# Patient Record
Sex: MALE | Race: WHITE | NOT HISPANIC OR LATINO | Employment: FULL TIME | URBAN - METROPOLITAN AREA
[De-identification: names, ages, dates, MRNs, and addresses within clinical notes are randomized per-mention and may not be internally consistent; named-entity substitution may affect disease eponyms.]

---

## 2018-04-05 ENCOUNTER — OFFICE VISIT (OUTPATIENT)
Dept: URGENT CARE | Facility: CLINIC | Age: 38
End: 2018-04-05
Payer: COMMERCIAL

## 2018-04-05 VITALS
HEIGHT: 75 IN | OXYGEN SATURATION: 97 % | TEMPERATURE: 101.2 F | WEIGHT: 215 LBS | DIASTOLIC BLOOD PRESSURE: 99 MMHG | SYSTOLIC BLOOD PRESSURE: 141 MMHG | HEART RATE: 96 BPM | BODY MASS INDEX: 26.73 KG/M2 | RESPIRATION RATE: 18 BRPM

## 2018-04-05 DIAGNOSIS — K62.89 PAIN, RECTAL: Primary | ICD-10-CM

## 2018-04-05 PROCEDURE — 99203 OFFICE O/P NEW LOW 30 MIN: CPT

## 2018-04-05 RX ORDER — MULTIVIT-MIN/IRON FUM/FOLIC AC 7.5 MG-4
1 TABLET ORAL DAILY
COMMUNITY

## 2018-04-05 RX ORDER — SACCHAROMYCES BOULARDII 250 MG
250 CAPSULE ORAL 2 TIMES DAILY
COMMUNITY
End: 2019-11-30 | Stop reason: HOSPADM

## 2018-04-05 RX ORDER — ALLOPURINOL 300 MG/1
300 TABLET ORAL DAILY
COMMUNITY

## 2018-04-05 NOTE — PROGRESS NOTES
[facility]        NAME: Kanchan Lorenzana is a 42 y o  male  : 1980    MRN: [id number]  DATE: 2018  TIME: 3:11 PM    Assessment and Plan   Pain, rectal [K62 89]  1  Pain, rectal       Patient Instructions     Pt to hospital for further evaluation    Chief Complaint     Chief Complaint   Patient presents with    Rectal Pain     rectal pain after coughing, described as sharp  dull pain when just sitting  no noticable blood with stool  states 2 episodes of pink blood when passed gas  History of Present Illness       42yo M p/w worsening rectal pain x 1 week since initiation of cough   Patient was seen by PCP 1 week ago, dx with pharyngitis, and placed on amoxicillin  Patient states pain is most intense during cough- described as 8/10 and sharp in nature  Pain is dull and 2/10 at rest   Patient denies blood in stool but did notice blood near rectum twice after passing gas  Patient denies diarrhea, abdominal pain, sob, hemorrhoids,  Hx of rectal pain  Patient found to be febrile upon exam         Review of Systems   Review of Systems   Constitutional: Positive for fever  Negative for activity change, appetite change, chills, diaphoresis, fatigue and unexpected weight change  Respiratory: Negative for apnea, cough, choking, chest tightness, shortness of breath, wheezing and stridor  Cardiovascular: Negative for chest pain, palpitations and leg swelling  Gastrointestinal: Positive for anal bleeding and rectal pain  Negative for abdominal distention, abdominal pain, blood in stool, constipation, diarrhea, nausea and vomiting           Current Medications       Current Outpatient Prescriptions:     allopurinol (ZYLOPRIM) 300 mg tablet, Take 300 mg by mouth daily, Disp: , Rfl:     Multiple Vitamins-Minerals (MULTIVITAMIN WITH MINERALS) tablet, Take 1 tablet by mouth daily, Disp: , Rfl:     saccharomyces boulardii (FLORASTOR) 250 mg capsule, Take 250 mg by mouth 2 (two) times a day, Disp: , Rfl:     Current Allergies     Allergies as of 04/05/2018 - Reviewed 04/05/2018   Allergen Reaction Noted    Kiwi extract Tongue Swelling 04/05/2018            The following portions of the patient's history were reviewed and updated as appropriate: allergies, current medications, past family history, past medical history, past social history, past surgical history and problem list      Past Medical History:   Diagnosis Date    Gout        Past Surgical History:   Procedure Laterality Date    KNEE SURGERY Left        No family history on file  Medications have been verified  Objective   /99   Pulse 96   Temp (!) 101 2 °F (38 4 °C) (Tympanic)   Resp 18   Ht 6' 3" (1 905 m)   Wt 97 5 kg (215 lb)   SpO2 97%   BMI 26 87 kg/m²        Physical Exam     Physical Exam   Constitutional: He appears well-developed and well-nourished  HENT:   Head: Normocephalic  Cardiovascular: Normal rate, regular rhythm, normal heart sounds and intact distal pulses  Exam reveals no gallop and no friction rub  No murmur heard  Pulmonary/Chest: Effort normal and breath sounds normal  No respiratory distress  He has no wheezes  He has no rales  He exhibits no tenderness  Abdominal: Soft  Bowel sounds are normal  He exhibits no distension and no mass  There is no tenderness  There is no rebound, no guarding and no CVA tenderness  No hernia     Rectal exam normal- no blood, fissure, hemorrhoid

## 2018-06-03 ENCOUNTER — OFFICE VISIT (OUTPATIENT)
Dept: URGENT CARE | Facility: CLINIC | Age: 38
End: 2018-06-03
Payer: COMMERCIAL

## 2018-06-03 VITALS
BODY MASS INDEX: 26.11 KG/M2 | SYSTOLIC BLOOD PRESSURE: 162 MMHG | WEIGHT: 210 LBS | TEMPERATURE: 99 F | DIASTOLIC BLOOD PRESSURE: 98 MMHG | RESPIRATION RATE: 18 BRPM | HEART RATE: 87 BPM | HEIGHT: 75 IN | OXYGEN SATURATION: 99 %

## 2018-06-03 DIAGNOSIS — H10.9 BACTERIAL CONJUNCTIVITIS: Primary | ICD-10-CM

## 2018-06-03 DIAGNOSIS — M77.8 WRIST TENDONITIS: ICD-10-CM

## 2018-06-03 PROCEDURE — 99213 OFFICE O/P EST LOW 20 MIN: CPT | Performed by: PHYSICIAN ASSISTANT

## 2018-06-03 RX ORDER — NAPROXEN 500 MG/1
250 TABLET ORAL 2 TIMES DAILY WITH MEALS
Qty: 14 TABLET | Refills: 0 | Status: SHIPPED | OUTPATIENT
Start: 2018-06-03 | End: 2019-11-30 | Stop reason: HOSPADM

## 2018-06-03 RX ORDER — TOBRAMYCIN 3 MG/ML
1 SOLUTION/ DROPS OPHTHALMIC
Qty: 1.8 ML | Refills: 0 | Status: SHIPPED | OUTPATIENT
Start: 2018-06-03 | End: 2018-06-10

## 2018-06-03 NOTE — PROGRESS NOTES
[facility]        NAME: Veronique Flanagan is a 42 y o  male  : 1980    MRN: [id number]  DATE: Dalia 3, 2018  TIME: 12:04 PM    Assessment and Plan   Bacterial conjunctivitis [H10 9]  1  Bacterial conjunctivitis  tobramycin (TOBREX) 0 3 % SOLN   2  Wrist tendonitis  naproxen (NAPROSYN) 500 mg tablet     Patient Instructions     Thumb spica splint  Apply ice 10-20 minutes at a time at least 4 times a day  Naproxen BID  Take drops as prescribed  Follow up with PCP in 3-5 days  Proceed to  ER if symptoms worsen  Chief Complaint     Chief Complaint   Patient presents with    Eye Pain     woke up this morning with right eye swollen and crusted    Wrist Pain     pain left wrist pain for 1 month with weakness  no injury     History of Present Illness       Eye Pain    The right eye is affected  This is a new problem  The current episode started today  The problem occurs constantly  The problem has been gradually worsening  There was no injury mechanism  There is known exposure to pink eye  He wears contacts  Associated symptoms include an eye discharge and eye redness  Pertinent negatives include no blurred vision, double vision, fever, foreign body sensation, itching, nausea, photophobia, recent URI, tingling or vomiting  He has tried nothing for the symptoms  Wrist Pain    The pain is present in the left wrist  This is a new problem  Episode onset: 1 mo ago  There has been no history of extremity trauma  The problem occurs daily  The problem has been gradually worsening  The quality of the pain is described as sharp  The pain is moderate  Pertinent negatives include no fever, inability to bear weight, itching, joint locking, joint swelling, limited range of motion, numbness, stiffness or tingling  He has tried nothing for the symptoms  Family history does not include gout or rheumatoid arthritis  There is no history of diabetes, gout, osteoarthritis or rheumatoid arthritis         Review of Systems Review of Systems   Constitutional: Negative for fever  Eyes: Positive for pain, discharge and redness  Negative for blurred vision, double vision, photophobia, itching and visual disturbance  Gastrointestinal: Negative for nausea and vomiting  Musculoskeletal: Negative for gout and stiffness  Skin: Negative for itching  Neurological: Negative for tingling and numbness  Current Medications       Current Outpatient Prescriptions:     Cinnamon 500 MG TABS, Take 1 tablet by mouth daily, Disp: , Rfl:     allopurinol (ZYLOPRIM) 300 mg tablet, Take 300 mg by mouth daily, Disp: , Rfl:     Multiple Vitamins-Minerals (MULTIVITAMIN WITH MINERALS) tablet, Take 1 tablet by mouth daily, Disp: , Rfl:     naproxen (NAPROSYN) 500 mg tablet, Take 0 5 tablets (250 mg total) by mouth 2 (two) times a day with meals for 14 days, Disp: 14 tablet, Rfl: 0    saccharomyces boulardii (FLORASTOR) 250 mg capsule, Take 250 mg by mouth 2 (two) times a day, Disp: , Rfl:     tobramycin (TOBREX) 0 3 % SOLN, Administer 1 drop into the left eye every 4 (four) hours while awake for 7 days, Disp: 1 8 mL, Rfl: 0    Current Allergies     Allergies as of 06/03/2018 - Reviewed 06/03/2018   Allergen Reaction Noted    Kiwi extract Tongue Swelling 04/05/2018            The following portions of the patient's history were reviewed and updated as appropriate: allergies, current medications, past family history, past medical history, past social history, past surgical history and problem list      Past Medical History:   Diagnosis Date    Gout        Past Surgical History:   Procedure Laterality Date    KNEE SURGERY Left        No family history on file  Medications have been verified          Objective   /98   Pulse 87   Temp 99 °F (37 2 °C) (Tympanic)   Resp 18   Ht 6' 3" (1 905 m)   Wt 95 3 kg (210 lb)   SpO2 99%   BMI 26 25 kg/m²        Physical Exam     Physical Exam   Constitutional: He appears well-developed and well-nourished  Eyes: Right eye exhibits discharge (purulent)  Right eye exhibits no chemosis, no exudate and no hordeolum  No foreign body present in the right eye  Left eye exhibits no chemosis, no discharge, no exudate and no hordeolum  No foreign body present in the left eye  Right conjunctiva is injected  Right conjunctiva has no hemorrhage  Left conjunctiva is not injected  Left conjunctiva has no hemorrhage  Right eye exhibits normal extraocular motion and no nystagmus  Left eye exhibits normal extraocular motion and no nystagmus  Right pupil is round and reactive  Left pupil is round and reactive  Pupils are equal    Cardiovascular: Normal rate, regular rhythm, normal heart sounds and intact distal pulses  Exam reveals no friction rub  No murmur heard  Musculoskeletal:        Right wrist: He exhibits tenderness  He exhibits normal range of motion, no bony tenderness, no swelling, no effusion, no crepitus, no deformity and no laceration

## 2018-09-04 ENCOUNTER — OFFICE VISIT (OUTPATIENT)
Dept: URGENT CARE | Facility: CLINIC | Age: 38
End: 2018-09-04
Payer: COMMERCIAL

## 2018-09-04 VITALS
OXYGEN SATURATION: 99 % | HEIGHT: 75 IN | HEART RATE: 90 BPM | TEMPERATURE: 99.4 F | RESPIRATION RATE: 20 BRPM | BODY MASS INDEX: 26.11 KG/M2 | SYSTOLIC BLOOD PRESSURE: 169 MMHG | DIASTOLIC BLOOD PRESSURE: 100 MMHG | WEIGHT: 210 LBS

## 2018-09-04 DIAGNOSIS — H66.91 RIGHT OTITIS MEDIA, UNSPECIFIED OTITIS MEDIA TYPE: Primary | ICD-10-CM

## 2018-09-04 PROCEDURE — 99213 OFFICE O/P EST LOW 20 MIN: CPT | Performed by: PHYSICIAN ASSISTANT

## 2018-09-04 RX ORDER — AMOXICILLIN AND CLAVULANATE POTASSIUM 875; 125 MG/1; MG/1
1 TABLET, FILM COATED ORAL EVERY 12 HOURS SCHEDULED
Qty: 20 TABLET | Refills: 0 | Status: SHIPPED | OUTPATIENT
Start: 2018-09-04 | End: 2018-09-14

## 2018-09-04 RX ORDER — CETIRIZINE HYDROCHLORIDE 10 MG/1
10 TABLET ORAL DAILY
Qty: 30 TABLET | Refills: 0 | Status: SHIPPED | OUTPATIENT
Start: 2018-09-04 | End: 2020-10-04

## 2018-09-04 NOTE — PROGRESS NOTES
[facility]        NAME: Marina Chambers is a 42 y o  male  : 1980    MRN: [id number]  DATE: 2018  TIME: 7:54 PM    Assessment and Plan   Right otitis media, unspecified otitis media type [H66 91]  1  Right otitis media, unspecified otitis media type  amoxicillin-clavulanate (AUGMENTIN) 875-125 mg per tablet    cetirizine (ZyrTEC) 10 mg tablet         Patient Instructions     Take antibiotic as prescribed  Follow up with PCP in 3-5 days  Proceed to  ER if symptoms worsen  Chief Complaint     Chief Complaint   Patient presents with    Earache     right ear pain started 3 days ago, increasing pain today         History of Present Illness       Earache    There is pain in the right ear  This is a new problem  The current episode started in the past 7 days  The problem occurs every few minutes  The problem has been unchanged  There has been no fever  The pain is moderate  Associated symptoms include rhinorrhea  Pertinent negatives include no abdominal pain, coughing, diarrhea, ear discharge, headaches, hearing loss, neck pain, rash, sore throat or vomiting  He has tried nothing for the symptoms  There is no history of a chronic ear infection, hearing loss or a tympanostomy tube  Review of Systems   Review of Systems   HENT: Positive for ear pain and rhinorrhea  Negative for ear discharge, hearing loss and sore throat  Respiratory: Negative for cough  Gastrointestinal: Negative for abdominal pain, diarrhea and vomiting  Musculoskeletal: Negative for neck pain  Skin: Negative for rash  Neurological: Negative for headaches           Current Medications       Current Outpatient Prescriptions:     allopurinol (ZYLOPRIM) 300 mg tablet, Take 300 mg by mouth daily, Disp: , Rfl:     Cinnamon 500 MG TABS, Take 1 tablet by mouth daily, Disp: , Rfl:     Multiple Vitamins-Minerals (MULTIVITAMIN WITH MINERALS) tablet, Take 1 tablet by mouth daily, Disp: , Rfl:    amoxicillin-clavulanate (AUGMENTIN) 875-125 mg per tablet, Take 1 tablet by mouth every 12 (twelve) hours for 10 days, Disp: 20 tablet, Rfl: 0    cetirizine (ZyrTEC) 10 mg tablet, Take 1 tablet (10 mg total) by mouth daily for 30 days, Disp: 30 tablet, Rfl: 0    naproxen (NAPROSYN) 500 mg tablet, Take 0 5 tablets (250 mg total) by mouth 2 (two) times a day with meals for 14 days, Disp: 14 tablet, Rfl: 0    saccharomyces boulardii (FLORASTOR) 250 mg capsule, Take 250 mg by mouth 2 (two) times a day, Disp: , Rfl:     Current Allergies     Allergies as of 09/04/2018 - Reviewed 09/04/2018   Allergen Reaction Noted    Kiwi extract Tongue Swelling 04/05/2018            The following portions of the patient's history were reviewed and updated as appropriate: allergies, current medications, past family history, past medical history, past social history, past surgical history and problem list      Past Medical History:   Diagnosis Date    Gout        Past Surgical History:   Procedure Laterality Date    KNEE SURGERY Left        History reviewed  No pertinent family history  Medications have been verified  Objective   /100 (BP Location: Right arm, Patient Position: Sitting, Cuff Size: Large)   Pulse 90   Temp 99 4 °F (37 4 °C) (Tympanic)   Resp 20   Ht 6' 3" (1 905 m)   Wt 95 3 kg (210 lb)   SpO2 99%   BMI 26 25 kg/m²        Physical Exam     Physical Exam   Constitutional: He appears well-developed and well-nourished  HENT:   Head: Normocephalic  Right Ear: External ear normal  Tympanic membrane is erythematous and bulging  Left Ear: Hearing, tympanic membrane, external ear and ear canal normal    Nose: Mucosal edema and rhinorrhea present  Mouth/Throat: Oropharynx is clear and moist  No oropharyngeal exudate  Cardiovascular: Normal rate, regular rhythm and intact distal pulses  Exam reveals no gallop and no friction rub  No murmur heard    Pulmonary/Chest: Effort normal and breath sounds normal  No respiratory distress  He has no wheezes  He has no rales  Abdominal: Soft  Bowel sounds are normal  He exhibits no distension  There is no tenderness  There is no rebound and no guarding

## 2019-05-07 ENCOUNTER — OFFICE VISIT (OUTPATIENT)
Dept: URGENT CARE | Facility: CLINIC | Age: 39
End: 2019-05-07
Payer: COMMERCIAL

## 2019-05-07 VITALS
OXYGEN SATURATION: 100 % | WEIGHT: 220 LBS | RESPIRATION RATE: 18 BRPM | TEMPERATURE: 98 F | SYSTOLIC BLOOD PRESSURE: 142 MMHG | HEIGHT: 75 IN | BODY MASS INDEX: 27.35 KG/M2 | HEART RATE: 88 BPM | DIASTOLIC BLOOD PRESSURE: 90 MMHG

## 2019-05-07 DIAGNOSIS — H65.01 RIGHT ACUTE SEROUS OTITIS MEDIA, RECURRENCE NOT SPECIFIED: ICD-10-CM

## 2019-05-07 DIAGNOSIS — J30.2 SEASONAL ALLERGIES: Primary | ICD-10-CM

## 2019-05-07 PROCEDURE — 99213 OFFICE O/P EST LOW 20 MIN: CPT | Performed by: PHYSICIAN ASSISTANT

## 2019-05-07 RX ORDER — FLUTICASONE PROPIONATE 50 MCG
1 SPRAY, SUSPENSION (ML) NASAL DAILY
Qty: 1 BOTTLE | Refills: 0 | Status: SHIPPED | OUTPATIENT
Start: 2019-05-07 | End: 2019-11-30 | Stop reason: HOSPADM

## 2019-05-07 RX ORDER — FEXOFENADINE HYDROCHLORIDE 60 MG/1
60 TABLET, FILM COATED ORAL DAILY
Qty: 30 TABLET | Refills: 0 | Status: SHIPPED | OUTPATIENT
Start: 2019-05-07 | End: 2019-11-30 | Stop reason: HOSPADM

## 2019-06-07 DIAGNOSIS — J30.2 SEASONAL ALLERGIES: ICD-10-CM

## 2019-06-10 RX ORDER — FLUTICASONE PROPIONATE 50 MCG
SPRAY, SUSPENSION (ML) NASAL
Refills: 0 | OUTPATIENT
Start: 2019-06-10

## 2019-06-10 RX ORDER — FEXOFENADINE HYDROCHLORIDE 60 MG/1
TABLET, FILM COATED ORAL
Qty: 30 TABLET | Refills: 0 | OUTPATIENT
Start: 2019-06-10

## 2019-07-25 ENCOUNTER — OFFICE VISIT (OUTPATIENT)
Dept: URGENT CARE | Facility: CLINIC | Age: 39
End: 2019-07-25
Payer: COMMERCIAL

## 2019-07-25 VITALS
RESPIRATION RATE: 16 BRPM | OXYGEN SATURATION: 99 % | HEIGHT: 75 IN | WEIGHT: 235 LBS | SYSTOLIC BLOOD PRESSURE: 174 MMHG | HEART RATE: 90 BPM | BODY MASS INDEX: 29.22 KG/M2 | DIASTOLIC BLOOD PRESSURE: 94 MMHG | TEMPERATURE: 99.4 F

## 2019-07-25 DIAGNOSIS — R21 RASH: Primary | ICD-10-CM

## 2019-07-25 PROCEDURE — 99213 OFFICE O/P EST LOW 20 MIN: CPT | Performed by: PHYSICIAN ASSISTANT

## 2019-07-25 RX ORDER — MOMETASONE FUROATE 1 MG/G
CREAM TOPICAL DAILY
Qty: 45 G | Refills: 0 | Status: SHIPPED | OUTPATIENT
Start: 2019-07-25 | End: 2019-11-30 | Stop reason: HOSPADM

## 2019-07-25 NOTE — PATIENT INSTRUCTIONS
Apply Elocon cream as prescribed (Do not use for longer than 4 weeks)  Wash hands following administered   Wear deet or picaridin spray to avoid bug bites  Avoid scratching area  Follow up with PCP in 3-5 days  Proceed to  ER if symptoms worsen  Acute Rash   WHAT YOU NEED TO KNOW:   A rash is irritation, redness, or itchiness in the skin or mucus membranes  Mucus membranes are areas such as the lining of your nose or throat  Acute means the rash starts suddenly, worsens quickly, and lasts a short time  An acute rash may be caused by a disease, such as hepatitis or vasculitis  The rash may be a reaction to something you are allergic to, such as certain foods, or latex  Certain medicines, including antibiotics, NSAIDs, prescription pain medicine, and aspirin can also cause a rash  DISCHARGE INSTRUCTIONS:   Return to the emergency department if:   · You have sudden trouble breathing or chest pain  · You are vomiting, have a headache or muscle aches, and your throat hurts  Contact your healthcare provider if:   · You have a fever  · You get open wounds from scratching your skin, or you have a wound that is red, swollen, or painful  · Your rash lasts longer than 3 months  · You have swelling or pain in your joints  · You have questions or concerns about your condition or care  Medicines:  If your rash does not go away on its own, you may need the following medicines:  · Antihistamines  may be given to help decrease itching  · Steroids  may be given to decrease inflammation  · Antibiotics  help fight or prevent a bacterial infection  · Take your medicine as directed  Contact your healthcare provider if you think your medicine is not helping or if you have side effects  Tell him of her if you are allergic to any medicine  Keep a list of the medicines, vitamins, and herbs you take  Include the amounts, and when and why you take them   Bring the list or the pill bottles to follow-up visits  Carry your medicine list with you in case of an emergency  Prevent a rash or care for your skin when you have a rash:  Dry skin can lead to more problems  Do not scratch your skin if it itches  You may cause a skin infection by scratching  The following may prevent dry skin, and help your skin look better:  · Use thick cream lotions or petroleum jelly to help soothe your rash  These products work well on areas with thick skin, such as your feet  Cool compresses may also be used to soothe your skin  Apply a cool compress or a cool, wet towel, and then cover it with a dry towel  · Use lukewarm water when you bathe  Hot water may damage your skin more  Pat your skin dry  Do not rub your skin with a towel  · Use detergents, soaps, shampoos, and bubble baths made for sensitive skin  Wear clothes that are made of cotton instead of nylon or wool  Cotton is softer, so it will not hurt your skin as much  Follow up with your healthcare provider as directed: You may need to see a dermatologist if healthcare providers do not know what is causing your rash  You may also need to see a dermatologist if your rash does not get better even with treatment  You may need to see a dietitian if you have allergies to foods  Write down your questions so you remember to ask them during your visits  © 2017 [address] [employer] Information is for End User's use only and may not be sold, redistributed or otherwise used for commercial purposes  All illustrations and images included in CareNotes® are the copyrighted property of [employer]  or [employer]  The above information is an  only  It is not intended as medical advice for individual conditions or treatments  Talk to your doctor, nurse or pharmacist before following any medical regimen to see if it is safe and effective for you

## 2019-07-25 NOTE — PROGRESS NOTES
[facility]        NAME: Haris Buchanan is a 34 y o  male  : 1980    MRN: [id number]  DATE: 2019  TIME: 12:51 PM    Assessment and Plan   Rash [R21]  1  Rash  mometasone (ELOCON) 0 1 % cream     Rash does not look bacterial or fungal  Patient recalls bug bite over area some I am therefore covering for steroid responsive rashes  Discussed possibility of shingles given location and quality of discomfort  Recommended shingles vaccine and instructed patient to keep area covered  He is outside the range for antiviral administration  Take photo of rash each day and follow up with PCP if symptoms are not improving  Patient Instructions     Apply Elocon cream as prescribed (Do not use for longer than 4 weeks)  Wash hands following administered   Wear deet or picaridin spray to avoid bug bites  Avoid scratching area  Follow up with PCP in 3-5 days  Proceed to  ER if symptoms worsen  Chief Complaint     Chief Complaint   Patient presents with    Insect Bite     c/o insect bite that occurred approx 5 days ago that began as a small raised red area and has increased to a 2" red itchy area with a few small red raised bumps around it  History of Present Illness       Patient states he was out on the deck when he felt a sudden stinging pain "like something bit me" and visualized a small red bump over the L aspect of his mid back x 5 days ago  States rash has grown  Admits to pruritic and occasionally burning pain  Denies mediations  Denies contacts with other symptoms  Patient had varicella as a child  Denies shingles vaccines  Insect Bite   This is a new problem  The current episode started in the past 7 days  The problem occurs constantly  The problem has been gradually worsening  Pertinent negatives include no coughing or sore throat  Review of Systems   Review of Systems   HENT: Negative for sore throat and trouble swallowing      Respiratory: Negative for cough and shortness of breath  Skin: Positive for color change  Negative for wound  Allergic/Immunologic: Negative for environmental allergies and food allergies  Current Medications       Current Outpatient Medications:     allopurinol (ZYLOPRIM) 300 mg tablet, Take 300 mg by mouth daily, Disp: , Rfl:     Cinnamon 500 MG TABS, Take 1 tablet by mouth daily, Disp: , Rfl:     fexofenadine (ALLEGRA) 60 MG tablet, Take 1 tablet (60 mg total) by mouth daily, Disp: 30 tablet, Rfl: 0    fluticasone (FLONASE) 50 mcg/act nasal spray, 1 spray into each nostril daily, Disp: 1 Bottle, Rfl: 0    Multiple Vitamins-Minerals (MULTIVITAMIN WITH MINERALS) tablet, Take 1 tablet by mouth daily, Disp: , Rfl:     saccharomyces boulardii (FLORASTOR) 250 mg capsule, Take 250 mg by mouth 2 (two) times a day, Disp: , Rfl:     cetirizine (ZyrTEC) 10 mg tablet, Take 1 tablet (10 mg total) by mouth daily for 30 days, Disp: 30 tablet, Rfl: 0    mometasone (ELOCON) 0 1 % cream, Apply topically daily, Disp: 45 g, Rfl: 0    naproxen (NAPROSYN) 500 mg tablet, Take 0 5 tablets (250 mg total) by mouth 2 (two) times a day with meals for 14 days, Disp: 14 tablet, Rfl: 0    Current Allergies     Allergies as of 07/25/2019 - Reviewed 07/25/2019   Allergen Reaction Noted    Kiwi extract Tongue Swelling 04/05/2018            The following portions of the patient's history were reviewed and updated as appropriate: allergies, current medications, past family history, past medical history, past social history, past surgical history and problem list      Past Medical History:   Diagnosis Date    Allergic     Diabetes mellitus ([facility] [address] )     Gout     Gout        Past Surgical History:   Procedure Laterality Date    KNEE SURGERY Left     NOSE SURGERY         Family History   Problem Relation Age of Onset    Heart disease Mother     Diabetes Mother     Parkinsonism Father     Heart disease Father          Medications have been verified          Objective BP (!) 174/94   Pulse 90   Temp 99 4 °F (37 4 °C) (Tympanic)   Resp 16   Ht 6' 3" (1 905 m)   Wt 107 kg (235 lb)   SpO2 99%   BMI 29 37 kg/m²        Physical Exam     Physical Exam   Constitutional: He appears well-developed and well-nourished  No distress  HENT:   Head: Normocephalic and atraumatic  Cardiovascular: Normal rate, regular rhythm and normal heart sounds  Exam reveals no gallop and no friction rub  No murmur heard  Pulmonary/Chest: Effort normal and breath sounds normal  No respiratory distress  He has no wheezes  He has no rales  He exhibits no tenderness  Musculoskeletal:        Back:    Lymphadenopathy:     He has no cervical adenopathy  Neurological: He is alert  Skin: Rash (5cm x 2cm erythematous plaque with scattered surrounding erythematou papules  No vesicles  Rash location depicted below  ) noted  He is not diaphoretic

## 2019-11-30 ENCOUNTER — OFFICE VISIT (OUTPATIENT)
Dept: URGENT CARE | Facility: CLINIC | Age: 39
End: 2019-11-30
Payer: COMMERCIAL

## 2019-11-30 VITALS
SYSTOLIC BLOOD PRESSURE: 162 MMHG | HEART RATE: 107 BPM | DIASTOLIC BLOOD PRESSURE: 100 MMHG | OXYGEN SATURATION: 97 % | TEMPERATURE: 99.4 F | RESPIRATION RATE: 18 BRPM

## 2019-11-30 DIAGNOSIS — M54.42 ACUTE LEFT-SIDED LOW BACK PAIN WITH LEFT-SIDED SCIATICA: Primary | ICD-10-CM

## 2019-11-30 DIAGNOSIS — H10.32 ACUTE CONJUNCTIVITIS OF LEFT EYE, UNSPECIFIED ACUTE CONJUNCTIVITIS TYPE: ICD-10-CM

## 2019-11-30 PROCEDURE — 99213 OFFICE O/P EST LOW 20 MIN: CPT | Performed by: EMERGENCY MEDICINE

## 2019-11-30 RX ORDER — CYCLOBENZAPRINE HCL 10 MG
10 TABLET ORAL 3 TIMES DAILY PRN
Qty: 20 TABLET | Refills: 0 | Status: SHIPPED | OUTPATIENT
Start: 2019-11-30 | End: 2020-01-02

## 2019-11-30 RX ORDER — PREDNISONE 10 MG/1
TABLET ORAL
Qty: 27 TABLET | Refills: 0 | Status: SHIPPED | OUTPATIENT
Start: 2019-11-30 | End: 2020-01-02

## 2019-11-30 RX ORDER — AMOXICILLIN 500 MG/1
500 CAPSULE ORAL EVERY 8 HOURS SCHEDULED
COMMUNITY
End: 2020-01-02

## 2019-11-30 RX ORDER — POLYMYXIN B SULFATE AND TRIMETHOPRIM 1; 10000 MG/ML; [USP'U]/ML
1 SOLUTION OPHTHALMIC EVERY 6 HOURS
Qty: 10 ML | Refills: 0 | Status: SHIPPED | OUTPATIENT
Start: 2019-11-30 | End: 2019-12-07

## 2019-11-30 NOTE — PROGRESS NOTES
[facility]        NAME: Christofer Esteban is a 37 y o  male  : 1980    MRN: [id number]  DATE: 2019  TIME: 2:10 PM    Assessment and Plan   Acute left-sided low back pain with left-sided sciatica [M54 42]  1  Acute left-sided low back pain with left-sided sciatica  predniSONE 10 mg tablet    cyclobenzaprine (FLEXERIL) 10 mg tablet   2  Acute conjunctivitis of left eye, unspecified acute conjunctivitis type  polymyxin b-trimethoprim (POLYTRIM) ophthalmic solution         Patient Instructions     Patient Instructions     Crucifix stretch (or sometimes referred to as crocodile stretch or iron cross), as demonstrated, on awakening and while still in bed, then activate your deep core muscles by imagining that you are pinning your navel to your spine and hold for reps of 10 seconds as discussed  Core bracing as demonstrated  Squat stretch as demonstrated  Hanging traction as discussed  [employer] up as discussed  Bird Dog Core exercise as discussed  Glute bridge as discussed  Side Bridge s discussed  Use heat 10 - 15 minutes every 2 - 3 hrs especially before stretching, but may use ice for reducing inflammation  Hold any NSAID's like Ibuprofen (Advil), Naprosyn (Aleve), etc while on steroids like Medrol or Prednisone  Do not stretch by bending at your waist and touching toes, as this type of stretch could injury year back further   Also  in the future do not do any sit-ups or crutches as this could reinjure your back  Watch YouTube video called "Home's Limber 11"    Sciatica   WHAT YOU NEED TO KNOW:   Sciatica is a condition that causes pain along your sciatic nerve  The sciatic nerve runs from your spine through both sides of your buttocks  It then runs down the back of your thigh, into your lower leg and foot  Your sciatic nerve may be compressed, inflamed, irritated, or stretched  DISCHARGE INSTRUCTIONS:   Medicines:   · NSAIDs:  These medicines decrease swelling and pain  NSAIDs are available without a doctor's order  Ask your healthcare provider which medicine is right for you  Ask how much to take and when to take it  Take as directed  NSAIDs can cause stomach bleeding or kidney problems if not taken correctly  · Acetaminophen: This medicine decreases pain  Acetaminophen is available without a doctor's order  Ask how much to take and when to take it  Follow directions  Acetaminophen can cause liver damage if not taken correctly  · Muscle relaxers  help decrease pain and muscle spasms  · Take your medicine as directed  Contact your healthcare provider if you think your medicine is not helping or if you have side effects  Tell him of her if you are allergic to any medicine  Keep a list of the medicines, vitamins, and herbs you take  Include the amounts, and when and why you take them  Bring the list or the pill bottles to follow-up visits  Carry your medicine list with you in case of an emergency  Follow up with your healthcare provider as directed:  Write down your questions so you remember to ask them during your visits  Manage your symptoms:   · Activity:  Decrease your activity  Do not lift heavy objects or twist your back for at least 6 weeks  Slowly return to your usual activity  · Ice:  Ice helps decrease swelling and pain  Ice may also help prevent tissue damage  Use an ice pack, or put crushed ice in a plastic bag  Cover it with a towel and place it on your low back or leg for 15 to 20 minutes every hour or as directed  · Heat:  Heat helps decrease pain and muscle spasms  Apply heat on the area for 20 to 30 minutes every 2 hours for as many days as directed  · Physical therapy:  You may need to see physical therapist to teach you exercises to help improve movement and strength, and to decrease pain  An occupational therapist teaches you skills to help with your daily activities  · Use assistive devices if directed:   You may need to wear back support, such as a back brace  You may need crutches, a cane, or a walker to decrease stress on your lower back and leg muscles  Ask your healthcare provider for more information about assistive devices and how to use them correctly  Self-care:   · Avoid pressure on your back and legs:  Do not  lift heavy objects, or stand or sit for long periods of time  · Lift objects safely:  Keep your back straight and bend your knees when you  an object  Do not bend or twist your back when you lift  · Maintain a healthy weight:  Ask your healthcare provider how much you should weigh  Ask him to help you create a weight loss plan if you are overweight  · Exercise:  Ask your healthcare provider about the best stretching, warmup, and exercise plan for you  Contact your healthcare provider if:   · You have pain in your lower back at night or when resting  · You have pain in your lower back with numbness below the knee  · You have weakness in one leg only  · You have questions or concerns about your condition or care  Return to the emergency department if:   · You have trouble holding back your urine or bowel movements  · You have weakness in both legs  · You have numbness in your groin or buttocks  © 2017 [address] [facility] Information is for End User's use only and may not be sold, redistributed or otherwise used for commercial purposes  All illustrations and images included in CareNotes® are the copyrighted property of [employer]  or Willis Odonnell  The above information is an  only  It is not intended as medical advice for individual conditions or treatments  Talk to your doctor, nurse or pharmacist before following any medical regimen to see if it is safe and effective for you  Conjunctivitis   AMBULATORY CARE:   Conjunctivitis,  or pink eye, is inflammation of your conjunctiva   The conjunctiva is a thin tissue that covers the front of your eye and the back of your eyelids  The conjunctiva helps protect your eye and keep it moist  Conjunctivitis may be caused by bacteria, allergies, or a virus  If your conjunctivitis is caused by bacteria, it may get better on its own in about 7 days  Viral conjunctivitis can last up to 3 weeks  Common symptoms may include any of the following: You will usually have symptoms in both eyes if your conjunctivitis is caused by allergies  You may also have other allergic symptoms, such as a rash or runny nose  Symptoms will usually start in 1 eye if your conjunctivitis is caused by a virus or bacteria  · Redness in the whites of your eye    · Itching in your eye or around your eye    · Feeling like there is something in your eye    · Watery or thick, sticky discharge    · Crusty eyelids when you wake up in the morning    · Burning, stinging, or swelling in your eye    · Pain when you see bright light  Seek care immediately if:   · You have worsening eye pain  · The swelling in your eye gets worse, even after treatment  · Your vision suddenly becomes worse or you cannot see at all  Contact your healthcare provider if:   · You develop a fever and ear pain  · You have tiny bumps or spots of blood on your eye  · You have questions or concerns about your condition or care  Treatment  will depend on the cause of your conjunctivitis  You may need antibiotics or allergy medicine as a pill, eye drop, or eye ointment  Manage your symptoms:   · Apply a cool compress  Wet a washcloth with cold water and place it on your eye  This will help decrease itching and irritation  · Do not wear contact lenses  They can irritate your eye  Throw away the pair you are using and ask when you can wear them again  Use a new pair of lenses when your healthcare provider says it is okay  · Avoid irritants  Stay away from smoke filled areas  Shield your eyes from wind and sun  · Flush your eye    You may need to flush your eye with saline to help decrease your symptoms  Ask for more information on how to flush your eye  Medicines:  Treatment depends on what is causing your conjunctivitis  You may be given any of the following:  · Allergy medicine  helps decrease itchy, red, swollen eyes caused by allergies  It may be given as a pill, eye drops, or nasal spray  · Antibiotics  may be needed if your conjunctivitis is caused by bacteria  This medicine may be given as a pill, eye drops, or eye ointment  · Take your medicine as directed  Contact your healthcare provider if you think your medicine is not helping or if you have side effects  Tell him or her if you are allergic to any medicine  Keep a list of the medicines, vitamins, and herbs you take  Include the amounts, and when and why you take them  Bring the list or the pill bottles to follow-up visits  Carry your medicine list with you in case of an emergency  Prevent the spread of conjunctivitis:   · Wash your hands with soap and water often  Wash your hands before and after you touch your eyes  Also wash your hands before you prepare or eat food and after you use the bathroom or change a diaper  · Avoid allergens  Try to avoid the things that cause your allergies, such as pets, dust, or grass  · Avoid contact with others  Do not share towels or washcloths  Try to stay away from others as much as possible  Ask when you can return to work or school  · Throw away eye makeup  The bacteria that caused your conjunctivitis can stay in eye makeup  Throw away mascara and other eye makeup  © 2017 [address] [place] Information is for End User's use only and may not be sold, redistributed or otherwise used for commercial purposes  All illustrations and images included in CareNotes® are the copyrighted property of [employer]  or Willis Odonnell  The above information is an  only   It is not intended as medical advice for individual conditions or treatments  Talk to your doctor, nurse or pharmacist before following any medical regimen to see if it is safe and effective for you  Follow up with PCP in 3-5 days  Proceed to  ER if symptoms worsen  Chief Complaint     Chief Complaint   Patient presents with    Back Pain     x1 day after over extending reaching for [employer] tree lights, pain radiates down left leg, Sitting makes it worse   Eye Pain     x1 day pain in left eye after putting in contacts -discharge -redness         History of Present Illness       Patient complains of gradual onset left low back pain after putting [place] decorations yesterday  He admits to prior low back problems in the past always responded to conservative therapy  He admits pain does radiate down his left leg  He also complains of left eye irritation since yesterday  He does wear contacts  He denies visual symptoms he denies eye discharge  Review of Systems   Review of Systems   Constitutional: Negative for chills and fever  Eyes: Positive for redness  Negative for pain and visual disturbance  Respiratory: Negative  Cardiovascular: Negative  Musculoskeletal: Positive for back pain  Skin: Negative for rash  Neurological: Negative for weakness, numbness and headaches           Current Medications       Current Outpatient Medications:     allopurinol (ZYLOPRIM) 300 mg tablet, Take 300 mg by mouth daily, Disp: , Rfl:     amoxicillin (AMOXIL) 500 mg capsule, Take 500 mg by mouth every 8 (eight) hours, Disp: , Rfl:     Cinnamon 500 MG TABS, Take 1 tablet by mouth daily, Disp: , Rfl:     Multiple Vitamins-Minerals (MULTIVITAMIN WITH MINERALS) tablet, Take 1 tablet by mouth daily, Disp: , Rfl:     cetirizine (ZyrTEC) 10 mg tablet, Take 1 tablet (10 mg total) by mouth daily for 30 days, Disp: 30 tablet, Rfl: 0    cyclobenzaprine (FLEXERIL) 10 mg tablet, Take 1 tablet (10 mg total) by mouth 3 (three) times a day as needed for muscle spasms, Disp: 20 tablet, Rfl: 0    polymyxin b-trimethoprim (POLYTRIM) ophthalmic solution, Administer 1 drop into the left eye every 6 (six) hours for 7 days, Disp: 10 mL, Rfl: 0    predniSONE 10 mg tablet, Take once daily all days pills on this schedule 6- 6- 5- 4- 3- 2- 1, Disp: 27 tablet, Rfl: 0    Current Allergies     Allergies as of 11/30/2019 - Reviewed 11/30/2019   Allergen Reaction Noted    Kiwi extract Tongue Swelling 04/05/2018            The following portions of the patient's history were reviewed and updated as appropriate: allergies, current medications, past family history, past medical history, past social history, past surgical history and problem list      Past Medical History:   Diagnosis Date    Allergic     Diabetes mellitus ([address] )     Gout     Gout        Past Surgical History:   Procedure Laterality Date    KNEE SURGERY Left     NOSE SURGERY         Family History   Problem Relation Age of Onset    Heart disease Mother     Diabetes Mother     Parkinsonism Father     Heart disease Father          Medications have been verified  Objective   /100   Pulse (!) 107   Temp 99 4 °F (37 4 °C) (Tympanic)   Resp 18   SpO2 97%        Physical Exam     Physical Exam   Constitutional: He is oriented to person, place, and time  He appears well-developed and well-nourished  No distress  Eyes: Pupils are equal, round, and reactive to light  EOM are normal  Right eye exhibits no chemosis  No foreign body present in the right eye  Left eye exhibits no chemosis  No foreign body present in the left eye  Right conjunctiva is not injected  Left conjunctiva is injected  Neck: Neck supple  Cardiovascular: Normal rate and regular rhythm  Pulmonary/Chest: Effort normal and breath sounds normal    Musculoskeletal: He exhibits tenderness  He exhibits no deformity  Tender paravertebral muscles lumbar spine left    Lumbar flexion full painful, lumbar extension full painful, left and right lumbar rotation full but painful  Straight leg raising negative  Neurological: He is alert and oriented to person, place, and time  He has normal reflexes  Skin: Skin is warm and dry  No rash noted  No erythema  Psychiatric: He has a normal mood and affect  His behavior is normal  Judgment and thought content normal    Nursing note and vitals reviewed

## 2019-11-30 NOTE — PATIENT INSTRUCTIONS
Crucifix stretch (or sometimes referred to as crocodile stretch or iron cross), as demonstrated, on awakening and while still in bed, then activate your deep core muscles by imagining that you are pinning your navel to your spine and hold for reps of 10 seconds as discussed  Core bracing as demonstrated  Squat stretch as demonstrated  Hanging traction as discussed  [employer] up as discussed  Bird Dog Core exercise as discussed  Glute bridge as discussed  Side Bridge s discussed  Use heat 10 - 15 minutes every 2 - 3 hrs especially before stretching, but may use ice for reducing inflammation  Hold any NSAID's like Ibuprofen (Advil), Naprosyn (Aleve), etc while on steroids like Medrol or Prednisone  Do not stretch by bending at your waist and touching toes, as this type of stretch could injury year back further   Also  in the future do not do any sit-ups or crutches as this could reinjure your back  Watch [employer] video called "Home's Limber 11"    Sciatica   WHAT YOU NEED TO KNOW:   Sciatica is a condition that causes pain along your sciatic nerve  The sciatic nerve runs from your spine through both sides of your buttocks  It then runs down the back of your thigh, into your lower leg and foot  Your sciatic nerve may be compressed, inflamed, irritated, or stretched  DISCHARGE INSTRUCTIONS:   Medicines:   · NSAIDs:  These medicines decrease swelling and pain  NSAIDs are available without a doctor's order  Ask your healthcare provider which medicine is right for you  Ask how much to take and when to take it  Take as directed  NSAIDs can cause stomach bleeding or kidney problems if not taken correctly  · Acetaminophen: This medicine decreases pain  Acetaminophen is available without a doctor's order  Ask how much to take and when to take it  Follow directions  Acetaminophen can cause liver damage if not taken correctly  · Muscle relaxers  help decrease pain and muscle spasms      · Take your medicine as directed  Contact your healthcare provider if you think your medicine is not helping or if you have side effects  Tell him of her if you are allergic to any medicine  Keep a list of the medicines, vitamins, and herbs you take  Include the amounts, and when and why you take them  Bring the list or the pill bottles to follow-up visits  Carry your medicine list with you in case of an emergency  Follow up with your healthcare provider as directed:  Write down your questions so you remember to ask them during your visits  Manage your symptoms:   · Activity:  Decrease your activity  Do not lift heavy objects or twist your back for at least 6 weeks  Slowly return to your usual activity  · Ice:  Ice helps decrease swelling and pain  Ice may also help prevent tissue damage  Use an ice pack, or put crushed ice in a plastic bag  Cover it with a towel and place it on your low back or leg for 15 to 20 minutes every hour or as directed  · Heat:  Heat helps decrease pain and muscle spasms  Apply heat on the area for 20 to 30 minutes every 2 hours for as many days as directed  · Physical therapy:  You may need to see physical therapist to teach you exercises to help improve movement and strength, and to decrease pain  An occupational therapist teaches you skills to help with your daily activities  · Use assistive devices if directed: You may need to wear back support, such as a back brace  You may need crutches, a cane, or a walker to decrease stress on your lower back and leg muscles  Ask your healthcare provider for more information about assistive devices and how to use them correctly  Self-care:   · Avoid pressure on your back and legs:  Do not  lift heavy objects, or stand or sit for long periods of time  · Lift objects safely:  Keep your back straight and bend your knees when you  an object  Do not bend or twist your back when you lift      · Maintain a healthy weight:  Ask your healthcare provider how much you should weigh  Ask him to help you create a weight loss plan if you are overweight  · Exercise:  Ask your healthcare provider about the best stretching, warmup, and exercise plan for you  Contact your healthcare provider if:   · You have pain in your lower back at night or when resting  · You have pain in your lower back with numbness below the knee  · You have weakness in one leg only  · You have questions or concerns about your condition or care  Return to the emergency department if:   · You have trouble holding back your urine or bowel movements  · You have weakness in both legs  · You have numbness in your groin or buttocks  © 2017 [address] [facility] Information is for End User's use only and may not be sold, redistributed or otherwise used for commercial purposes  All illustrations and images included in CareNotes® are the copyrighted property of [employer] A [employer] Inc  or Willis Odonnell  The above information is an  only  It is not intended as medical advice for individual conditions or treatments  Talk to your doctor, nurse or pharmacist before following any medical regimen to see if it is safe and effective for you  Conjunctivitis   AMBULATORY CARE:   Conjunctivitis,  or pink eye, is inflammation of your conjunctiva  The conjunctiva is a thin tissue that covers the front of your eye and the back of your eyelids  The conjunctiva helps protect your eye and keep it moist  Conjunctivitis may be caused by bacteria, allergies, or a virus  If your conjunctivitis is caused by bacteria, it may get better on its own in about 7 days  Viral conjunctivitis can last up to 3 weeks  Common symptoms may include any of the following: You will usually have symptoms in both eyes if your conjunctivitis is caused by allergies  You may also have other allergic symptoms, such as a rash or runny nose   Symptoms will usually start in 1 eye if your conjunctivitis is caused by a virus or bacteria  · Redness in the whites of your eye    · Itching in your eye or around your eye    · Feeling like there is something in your eye    · Watery or thick, sticky discharge    · Crusty eyelids when you wake up in the morning    · Burning, stinging, or swelling in your eye    · Pain when you see bright light  Seek care immediately if:   · You have worsening eye pain  · The swelling in your eye gets worse, even after treatment  · Your vision suddenly becomes worse or you cannot see at all  Contact your healthcare provider if:   · You develop a fever and ear pain  · You have tiny bumps or spots of blood on your eye  · You have questions or concerns about your condition or care  Treatment  will depend on the cause of your conjunctivitis  You may need antibiotics or allergy medicine as a pill, eye drop, or eye ointment  Manage your symptoms:   · Apply a cool compress  Wet a washcloth with cold water and place it on your eye  This will help decrease itching and irritation  · Do not wear contact lenses  They can irritate your eye  Throw away the pair you are using and ask when you can wear them again  Use a new pair of lenses when your healthcare provider says it is okay  · Avoid irritants  Stay away from smoke filled areas  Shield your eyes from wind and sun  · Flush your eye  You may need to flush your eye with saline to help decrease your symptoms  Ask for more information on how to flush your eye  Medicines:  Treatment depends on what is causing your conjunctivitis  You may be given any of the following:  · Allergy medicine  helps decrease itchy, red, swollen eyes caused by allergies  It may be given as a pill, eye drops, or nasal spray  · Antibiotics  may be needed if your conjunctivitis is caused by bacteria  This medicine may be given as a pill, eye drops, or eye ointment  · Take your medicine as directed    Contact your healthcare provider if you think your medicine is not helping or if you have side effects  Tell him or her if you are allergic to any medicine  Keep a list of the medicines, vitamins, and herbs you take  Include the amounts, and when and why you take them  Bring the list or the pill bottles to follow-up visits  Carry your medicine list with you in case of an emergency  Prevent the spread of conjunctivitis:   · Wash your hands with soap and water often  Wash your hands before and after you touch your eyes  Also wash your hands before you prepare or eat food and after you use the bathroom or change a diaper  · Avoid allergens  Try to avoid the things that cause your allergies, such as pets, dust, or grass  · Avoid contact with others  Do not share towels or washcloths  Try to stay away from others as much as possible  Ask when you can return to work or school  · Throw away eye makeup  The bacteria that caused your conjunctivitis can stay in eye makeup  Throw away mascara and other eye makeup  © 2017 [address] [place]  Information is for End User's use only and may not be sold, redistributed or otherwise used for commercial purposes  All illustrations and images included in CareNotes® are the copyrighted property of A D A M [employer] Inc  or Willis Odonnell  The above information is an  only  It is not intended as medical advice for individual conditions or treatments  Talk to your doctor, nurse or pharmacist before following any medical regimen to see if it is safe and effective for you

## 2020-01-02 ENCOUNTER — OFFICE VISIT (OUTPATIENT)
Dept: URGENT CARE | Facility: CLINIC | Age: 40
End: 2020-01-02
Payer: COMMERCIAL

## 2020-01-02 VITALS
BODY MASS INDEX: 29.22 KG/M2 | RESPIRATION RATE: 18 BRPM | DIASTOLIC BLOOD PRESSURE: 100 MMHG | WEIGHT: 235 LBS | SYSTOLIC BLOOD PRESSURE: 156 MMHG | OXYGEN SATURATION: 98 % | HEART RATE: 102 BPM | HEIGHT: 75 IN | TEMPERATURE: 101 F

## 2020-01-02 DIAGNOSIS — R68.89 FLU-LIKE SYMPTOMS: Primary | ICD-10-CM

## 2020-01-02 PROCEDURE — 99213 OFFICE O/P EST LOW 20 MIN: CPT | Performed by: PHYSICIAN ASSISTANT

## 2020-01-02 RX ORDER — SACCHAROMYCES BOULARDII 250 MG
250 CAPSULE ORAL 2 TIMES DAILY
COMMUNITY

## 2020-01-02 NOTE — LETTER
January 2, 2020     Patient: Linnette Rubio   YOB: 1980   Date of Visit: 1/2/2020       To Whom It May Concern: It is my medical opinion that Linnette Rubio may return to work on 1/10/2020  If you have any questions or concerns, please don't hesitate to call           Sincerely,        Katrin Westbrook PA-C    CC: No Recipients

## 2020-01-02 NOTE — PROGRESS NOTES
[address]        NAME: Olman Cornelius is a 37 y o  male  : 1980    MRN: [id number]  DATE: 2020  TIME: 1:41 PM    Assessment and Plan   Flu-like symptoms [R68 89]  1  Flu-like symptoms       In light of prior year's serious flu complications, emphasized symptoms in which to go to the ED  Patient verbalized understanding  Patient Instructions       Mucinex during the day   Fluids and rest  Tylenol/Ibuprofen for pain/fever  Salt water gargles and chloraseptic spray  Throat Coat Tea  Warm compresses over sinuses  Steam treatment (utilize proper safety precautions when in contact with hot water/steam)  Monitor for worsening symptoms   Follow up with PCP in 3-5 days and discuss blood pressure  Proceed to  ER if symptoms worsen  Chief Complaint     Chief Complaint   Patient presents with    Cough     nasal and chest congestion with harsh cough and watery eyes x 5 days with fever and loss of appeitite         History of Present Illness       PMH DM that is well controlled-last A1C 5 7  Patient did not have influenza vaccine this year  Cough   This is a new problem  The current episode started in the past 7 days  The problem has been unchanged  The problem occurs every few minutes  The cough is productive of sputum  Associated symptoms include chills, a fever, headaches, myalgias, rhinorrhea and a sore throat  Pertinent negatives include no chest pain, ear pain, postnasal drip, rash, shortness of breath or wheezing  Treatments tried: mucinex, ibuprofen, cough drops, sudafed  Review of Systems   Review of Systems   Constitutional: Positive for appetite change, chills, fatigue and fever  Negative for activity change  HENT: Positive for congestion, rhinorrhea, sneezing and sore throat  Negative for dental problem, ear discharge, ear pain, facial swelling, postnasal drip, sinus pressure, sinus pain and trouble swallowing  Eyes: Negative for itching     Respiratory: Positive for cough  Negative for chest tightness, shortness of breath and wheezing  Cardiovascular: Negative for chest pain and palpitations  Gastrointestinal: Negative for abdominal pain, constipation, diarrhea, nausea and vomiting  Musculoskeletal: Positive for myalgias  Skin: Negative for rash  Neurological: Positive for headaches  Negative for dizziness, weakness and light-headedness  Current Medications       Current Outpatient Medications:     allopurinol (ZYLOPRIM) 300 mg tablet, Take 300 mg by mouth daily, Disp: , Rfl:     Cinnamon 500 MG TABS, Take 1 tablet by mouth daily, Disp: , Rfl:     Multiple Vitamins-Minerals (MULTIVITAMIN WITH MINERALS) tablet, Take 1 tablet by mouth daily, Disp: , Rfl:     saccharomyces boulardii (FLORASTOR) 250 mg capsule, Take 250 mg by mouth 2 (two) times a day, Disp: , Rfl:     cetirizine (ZyrTEC) 10 mg tablet, Take 1 tablet (10 mg total) by mouth daily for 30 days, Disp: 30 tablet, Rfl: 0    Current Allergies     Allergies as of 01/02/2020 - Reviewed 01/02/2020   Allergen Reaction Noted    Kiwi extract Tongue Swelling 04/05/2018            The following portions of the patient's history were reviewed and updated as appropriate: allergies, current medications, past family history, past medical history, past social history, past surgical history and problem list      Past Medical History:   Diagnosis Date    Allergic     Diabetes mellitus ([address] )     Gout     Gout     Sinus infection        Past Surgical History:   Procedure Laterality Date    KNEE SURGERY Left     NOSE SURGERY      WRIST FRACTURE SURGERY         Family History   Problem Relation Age of Onset    Heart disease Mother     Diabetes Mother     Parkinsonism Father     Heart disease Father          Medications have been verified          Objective   /100   Pulse 102   Temp (!) 101 °F (38 3 °C) (Tympanic)   Resp 18   Ht 6' 3" (1 905 m)   Wt 107 kg (235 lb)   SpO2 98%   BMI 29 37 kg/m²        Physical Exam     Physical Exam   Constitutional: He is oriented to person, place, and time  He appears well-developed and well-nourished  No distress  HENT:   Head: Normocephalic  Right Ear: External ear normal    Left Ear: External ear normal    Nose: Nose normal    Mouth/Throat: Oropharynx is clear and moist  No oropharyngeal exudate  Posterior pharynx erythematous   Eyes: Conjunctivae are normal    Cardiovascular: Normal rate, regular rhythm, normal heart sounds and intact distal pulses  Exam reveals no gallop and no friction rub  No murmur heard  Pulmonary/Chest: Effort normal and breath sounds normal  No respiratory distress  He has no wheezes  He has no rales  He exhibits no tenderness  Lymphadenopathy:     He has no cervical adenopathy  Neurological: He is alert and oriented to person, place, and time  Skin: Skin is warm  He is not diaphoretic  Psychiatric: He has a normal mood and affect  His behavior is normal  Judgment and thought content normal    Vitals reviewed

## 2020-01-02 NOTE — PATIENT INSTRUCTIONS
Mucinex during the day   Fluids and rest  Tylenol/Ibuprofen for pain/fever  Salt water gargles and chloraseptic spray  Throat Coat Tea  Warm compresses over sinuses  Steam treatment (utilize proper safety precautions when in contact with hot water/steam)  Monitor for worsening symptoms   Follow up with PCP in 3-5 days and discuss blood pressure  Proceed to  ER if symptoms worsen  Influenza   WHAT YOU NEED TO KNOW:   Influenza (the flu) is an infection caused by the influenza virus  The flu is easily spread when an infected person coughs, sneezes, or has close contact with others  You may be able to spread the flu to others for 1 week or longer after signs or symptoms appear  DISCHARGE INSTRUCTIONS:   Call 911 for any of the following:   · You have trouble breathing, and your lips look purple or blue  · You have a seizure  Return to the emergency department if:   · You are dizzy, or you are urinating less or not at all  · You have a headache with a stiff neck, and you feel tired or confused  · You have new pain or pressure in your chest     · Your symptoms, such as shortness of breath, vomiting, or diarrhea, get worse  · Your symptoms, such as fever and coughing, seem to get better, but then get worse  Contact your healthcare provider if:   · You have new muscle pain or weakness  · You have questions or concerns about your condition or care  Medicines: You may need any of the following:  · Acetaminophen  decreases pain and fever  It is available without a doctor's order  Ask how much to take and how often to take it  Follow directions  Acetaminophen can cause liver damage if not taken correctly  · NSAIDs , such as ibuprofen, help decrease swelling, pain, and fever  This medicine is available with or without a doctor's order  NSAIDs can cause stomach bleeding or kidney problems in certain people   If you take blood thinner medicine, always ask your healthcare provider if NSAIDs are safe for you  Always read the medicine label and follow directions  · Antivirals  help fight a viral infection  · Take your medicine as directed  Contact your healthcare provider if you think your medicine is not helping or if you have side effects  Tell him or her if you are allergic to any medicine  Keep a list of the medicines, vitamins, and herbs you take  Include the amounts, and when and why you take them  Bring the list or the pill bottles to follow-up visits  Carry your medicine list with you in case of an emergency  Rest  as much as you can to help you recover  Drink liquids as directed  to help prevent dehydration  Ask how much liquid to drink each day and which liquids are best for you  Prevent the spread of influenza:   · Wash your hands often  Use soap and water  Wash your hands after you use the bathroom, change a child's diapers, or sneeze  Wash your hands before you prepare or eat food  Use gel hand cleanser when soap and water are not available  Do not touch your eyes, nose, or mouth unless you have washed your hands first            · Cover your mouth when you sneeze or cough  Cough into a tissue or the bend of your arm  · Clean shared items with a germ-killing   Clean table surfaces, doorknobs, and light switches  Do not share towels, silverware, and dishes with people who are sick  Wash bed sheets, towels, silverware, and dishes with soap and water  · Wear a mask  over your mouth and nose if you are sick or are near anyone who is sick  · Stay away from others  if you are sick  · Influenza vaccine  helps prevent influenza (flu)  Everyone older than 6 months should get a yearly influenza vaccine  Get the vaccine as soon as it is available, usually in September or October each year  Follow up with your healthcare provider as directed:  Write down your questions so you remember to ask them during your visits     © 2017 [address] Information is for End User's use only and may not be sold, redistributed or otherwise used for commercial purposes  All illustrations and images included in CareNotes® are the copyrighted property of A D A M , Inc  or [employer] Odonnell  The above information is an  only  It is not intended as medical advice for individual conditions or treatments  Talk to your doctor, nurse or pharmacist before following any medical regimen to see if it is safe and effective for you

## 2020-10-04 ENCOUNTER — APPOINTMENT (OUTPATIENT)
Dept: RADIOLOGY | Facility: CLINIC | Age: 40
End: 2020-10-04
Payer: COMMERCIAL

## 2020-10-04 ENCOUNTER — OFFICE VISIT (OUTPATIENT)
Dept: URGENT CARE | Facility: CLINIC | Age: 40
End: 2020-10-04
Payer: COMMERCIAL

## 2020-10-04 VITALS
BODY MASS INDEX: 26.73 KG/M2 | SYSTOLIC BLOOD PRESSURE: 156 MMHG | RESPIRATION RATE: 18 BRPM | WEIGHT: 215 LBS | DIASTOLIC BLOOD PRESSURE: 100 MMHG | OXYGEN SATURATION: 99 % | HEART RATE: 116 BPM | HEIGHT: 75 IN | TEMPERATURE: 98.6 F

## 2020-10-04 DIAGNOSIS — S00.33XA CONTUSION OF NOSE, INITIAL ENCOUNTER: Primary | ICD-10-CM

## 2020-10-04 DIAGNOSIS — R51.9 FACIAL PAIN: ICD-10-CM

## 2020-10-04 PROCEDURE — 99213 OFFICE O/P EST LOW 20 MIN: CPT | Performed by: PHYSICIAN ASSISTANT

## 2020-10-04 PROCEDURE — 70160 X-RAY EXAM OF NASAL BONES: CPT

## 2021-03-01 ENCOUNTER — OFFICE VISIT (OUTPATIENT)
Dept: URGENT CARE | Facility: CLINIC | Age: 41
End: 2021-03-01
Payer: COMMERCIAL

## 2021-03-01 VITALS
BODY MASS INDEX: 26.11 KG/M2 | DIASTOLIC BLOOD PRESSURE: 124 MMHG | OXYGEN SATURATION: 99 % | HEIGHT: 75 IN | RESPIRATION RATE: 16 BRPM | HEART RATE: 92 BPM | WEIGHT: 210 LBS | TEMPERATURE: 98 F | SYSTOLIC BLOOD PRESSURE: 186 MMHG

## 2021-03-01 DIAGNOSIS — R21 RASH: Primary | ICD-10-CM

## 2021-03-01 DIAGNOSIS — I10 ELEVATED BLOOD PRESSURE READING IN OFFICE WITH DIAGNOSIS OF HYPERTENSION: ICD-10-CM

## 2021-03-01 PROCEDURE — 99213 OFFICE O/P EST LOW 20 MIN: CPT | Performed by: EMERGENCY MEDICINE

## 2021-03-01 PROCEDURE — 87252 VIRUS INOCULATION TISSUE: CPT | Performed by: EMERGENCY MEDICINE

## 2021-03-01 RX ORDER — CRISABOROLE 20 MG/G
OINTMENT TOPICAL
COMMUNITY

## 2021-03-01 RX ORDER — VALACYCLOVIR HYDROCHLORIDE 1 G/1
1000 TABLET, FILM COATED ORAL 3 TIMES DAILY
Qty: 21 TABLET | Refills: 0 | Status: SHIPPED | OUTPATIENT
Start: 2021-03-01 | End: 2021-03-08

## 2021-03-01 NOTE — PATIENT INSTRUCTIONS
Shingles   WHAT YOU NEED TO KNOW:   Shingles is a painful rash  Shingles is caused by the same virus that causes chickenpox (varicella-zoster)  After you get chickenpox, the virus stays in your body for several years without causing any symptoms  Shingles occurs when the virus becomes active again  The active virus travels along a nerve to your skin and causes a rash  DISCHARGE INSTRUCTIONS:   Call your local emergency number (911 in the [facility]) if:   · You have trouble moving your arms, legs, or face  · You become confused, or have difficulty speaking  · You have a seizure  Return to the emergency department if:   · You have weakness in an arm or leg  · You have dizziness, a severe headache, or hearing or vision loss  · You have painful, red, warm skin around the blisters, or the blisters drain pus  · Your neck is stiff or you have trouble moving it  Call your doctor if:   · You feel weak or have a headache  · You have a cough, chills, or a fever  · You have abdominal pain or nausea, or you are vomiting  · Your rash becomes more itchy or painful  · Your rash spreads to other parts of your body  · Your pain worsens and does not go away even after you take medicine  · You have questions or concerns about your condition or care  Medicines: You may need any of the following:  · Antiviral medicine  helps decrease symptoms and healing time  They may also decrease your risk of developing nerve pain  You will need to start taking them within 3 days of the start of symptoms to prevent nerve pain  · Prescription pain medicine  may be given  Ask your healthcare provider how to take this medicine safely  Some prescription pain medicines contain acetaminophen  Do not take other medicines that contain acetaminophen without talking to your healthcare provider  Too much acetaminophen may cause liver damage  Prescription pain medicine may cause constipation   Ask your healthcare provider how to prevent or treat constipation  · Acetaminophen  decreases pain and fever  It is available without a doctor's order  Ask how much to take and how often to take it  Follow directions  Read the labels of all other medicines you are using to see if they also contain acetaminophen, or ask your doctor or pharmacist  Acetaminophen can cause liver damage if not taken correctly  Do not use more than 4 grams (4,000 milligrams) total of acetaminophen in one day  · NSAIDs , such as ibuprofen, help decrease swelling, pain, and fever  This medicine is available with or without a doctor's order  NSAIDs can cause stomach bleeding or kidney problems in certain people  If you take blood thinner medicine, always ask if NSAIDs are safe for you  Always read the medicine label and follow directions  Do not give these medicines to children under 10months of age without direction from your child's healthcare provider  · Topical anesthetics  are used to numb the skin and decrease pain  They can be a cream, gel, spray, or patch  · Anticonvulsants  decrease nerve pain and may help you sleep at night  · Antidepressants  may be used to decrease nerve pain  · Take your medicine as directed  Contact your healthcare provider if you think your medicine is not helping or if you have side effects  Tell him of her if you are allergic to any medicine  Keep a list of the medicines, vitamins, and herbs you take  Include the amounts, and when and why you take them  Bring the list or the pill bottles to follow-up visits  Carry your medicine list with you in case of an emergency  Self-care:  Keep your rash clean and dry  Cover your rash with a bandage or clothing  Do not use bandages that stick to your skin  The sticky part may irritate your skin and make your rash last longer  Prevent the spread of germs:       · Wash your hands often  Wash your hands several times each day   Wash after you use the bathroom, change a child's diaper, and before you prepare or eat food  Use soap and water every time  Rub your soapy hands together, lacing your fingers  Wash the front and back of your hands, and in between your fingers  Use the fingers of one hand to scrub under the fingernails of the other hand  Wash for at least 20 seconds  Rinse with warm, running water for several seconds  Then dry your hands with a clean towel or paper towel  Use hand  that contains alcohol if soap and water are not available  Do not touch your eyes, nose, or mouth without washing your hands first          · Cover a sneeze or cough  Use a tissue that covers your mouth and nose  Throw the tissue away in a trash can right away  Use the bend of your arm if a tissue is not available  Wash your hands well with soap and water or use a hand   · Stay away from others while you are sick  Avoid crowds as much as possible  · Ask about vaccines you may need  Talk to your healthcare provider about your vaccine history  He or she will tell you which vaccines you need, and when to get them  Prevent shingles or another shingles outbreak:  A vaccine may be given to help prevent shingles  You can get the vaccine even if you already had shingles  The vaccine can help prevent a future outbreak  If you do get shingles again, the vaccine can keep it from becoming severe  The vaccine comes in 2 forms  Your healthcare provider will tell you which form is right for you  The decision is based on your age and any medical conditions you have  A 2-dose vaccine is usually given to adults 48 years or older  A 1-dose vaccine may be given to adults 61 years or older    For more information:   · Centers for Disease Control and Prevention  [address] , [address]  Phone: 3- 478 - 8074554  Phone: 6- 527 - 9399836  Web Address: [employer] com     Follow up with your doctor as directed:  Write down your questions so you remember to ask them during your visits  © Copyright [address] Information is for End User's use only and may not be sold, redistributed or otherwise used for commercial purposes  All illustrations and images included in CareNotes® are the copyrighted property of A D A M , Inc  or Christiana Cabello  The above information is an  only  It is not intended as medical advice for individual conditions or treatments  Talk to your doctor, nurse or pharmacist before following any medical regimen to see if it is safe and effective for you  Hypertension   WHAT YOU NEED TO KNOW:   Hypertension is high blood pressure  Your blood pressure is the force of your blood moving against the walls of your arteries  Hypertension causes your blood pressure to get so high that your heart has to work much harder than normal  This can damage your heart  The cause of hypertension may not be known  This is called essential or primary hypertension  Hypertension caused by another medical condition, such as kidney disease, is called secondary hypertension  DISCHARGE INSTRUCTIONS:   Call 911 for any of the following:   · You have chest pain  · You have any of the following signs of a heart attack:      ? Squeezing, pressure, or pain in your chest    ? You may  also have any of the following:     § Discomfort or pain in your back, neck, jaw, stomach, or arm    § Shortness of breath    § Nausea or vomiting    § Lightheadedness or a sudden cold sweat    · You become confused or have difficulty speaking  · You suddenly feel lightheaded or have trouble breathing  Return to the emergency department if:   · You have a severe headache or vision loss  · You have weakness in an arm or leg  Contact your healthcare provider if:   · You feel faint, dizzy, confused, or drowsy  · You have been taking your blood pressure medicine but your pressure is higher than your provider says it should be      · You have questions or concerns about your condition or care  Medicines: You may  need any of the following:  · Antihypertensives  may be used to help lower your blood pressure  Several kinds of medicines are available  Your healthcare provider will choose medicines based on the kind of hypertension you have  You may need more than one type of medicine  Take the medicine exactly as directed  · Diuretics  help decrease extra fluid that collects in your body  This will help lower your blood pressure  You may urinate more often while you take this medicine  · Cholesterol medicine  helps lower your cholesterol level  A low cholesterol level helps prevent heart disease and makes it easier to control your blood pressure  · Take your medicine as directed  Contact your healthcare provider if you think your medicine is not helping or if you have side effects  Tell him or her if you are allergic to any medicine  Keep a list of the medicines, vitamins, and herbs you take  Include the amounts, and when and why you take them  Bring the list or the pill bottles to follow-up visits  Carry your medicine list with you in case of an emergency  Follow up with your healthcare provider as directed: You will need to return to have your blood pressure checked and to have other lab tests done  Write down your questions so you remember to ask them during your visits  Stages of hypertension:       · Normal blood pressure is 119/79 or lower   Your healthcare provider may only check your blood pressure each year if it stays at a normal level  · Elevated blood pressure is 120/79 to 129/79   This is sometimes called prehypertension  Your healthcare provider may suggest lifestyle changes to help lower your blood pressure to a normal level  He or she may then check it again in 3 to 6 months  · Stage 1 hypertension is 130/80  to 139/89    Your provider may recommend lifestyle changes, medication, and checks every 3 to 6 months until your blood pressure is controlled  · Stage 2 hypertension is 140/90 or higher   Your provider will recommend lifestyle changes and have you take 2 kinds of hypertension medicines  You will also need to have your blood pressure checked monthly until it is controlled  Manage hypertension:   · Check your blood pressure at home  Avoid smoking, caffeine, and exercise at least 30 minutes before checking your blood pressure  Sit and rest for 5 minutes before you take your blood pressure  Extend your arm and support it on a flat surface  Your arm should be at the same level as your heart  Follow the directions that came with your blood pressure monitor  Check your blood pressure 2 times, 1 minute apart, before you take your medicine in the morning  Also check your blood pressure before your evening meal  Keep a record of your readings and bring it to your follow-up visits  Ask your healthcare provider what your blood pressure should be  · Manage any other health conditions you have  Health conditions such as diabetes can increase your risk for hypertension  Follow your healthcare provider's instructions and take all your medicines as directed  · Ask about all medicines  Certain medicines can increase your blood pressure  Examples include oral birth control pills, decongestants, herbal supplements, and NSAIDs, such as ibuprofen  Your healthcare provider can tell you which medicines are safe for you to take  This includes prescription and over-the-counter medicines  Lifestyle changes you can make to manage hypertension:   · Limit sodium (salt) as directed  Too much sodium can affect your fluid balance  Check labels to find low-sodium or no-salt-added foods  Some low-sodium foods use potassium salts for flavor  Too much potassium can also cause health problems  Your healthcare provider will tell you how much sodium and potassium are safe for you to have in a day   He or she may recommend that you limit sodium to 2,300 mg a day          · Follow the meal plan recommended by your healthcare provider  A dietitian or your provider can give you more information on low-sodium plans or the DASH (Dietary Approaches to Stop Hypertension) eating plan  The DASH plan is low in sodium, unhealthy fats, and total fat  It is high in potassium, calcium, and fiber  · Exercise to maintain a healthy weight  Exercise at least 30 minutes per day, on most days of the week  This will help decrease your blood pressure  Ask your healthcare provider about the best exercise plan for you  · Decrease stress  This may help lower your blood pressure  Learn ways to relax, such as deep breathing or listening to music  · Limit alcohol as directed  Alcohol can increase your blood pressure  A drink of alcohol is 12 ounces of beer, 5 ounces of wine, or 1½ ounces of liquor  · Do not smoke  Nicotine and other chemicals in cigarettes and cigars can increase your blood pressure and also cause lung damage  Ask your healthcare provider for information if you currently smoke and need help to quit  E-cigarettes or smokeless tobacco still contain nicotine  Talk to your healthcare provider before you use these products  © Copyright [address] [place] [address] Drive Information is for End User's use only and may not be sold, redistributed or otherwise used for commercial purposes  All illustrations and images included in CareNotes® are the copyrighted property of [employer] D [employer] M , Inc  or [facility]   The above information is an  only  It is not intended as medical advice for individual conditions or treatments  Talk to your doctor, nurse or pharmacist before following any medical regimen to see if it is safe and effective for you

## 2021-03-01 NOTE — PROGRESS NOTES
[address]        NAME: Cori Betancourt is a 41 y o  male  : 1980    MRN: [id number]  DATE: 2021  TIME: 3:33 PM    Assessment and Plan   Rash [R21]  1  Rash  valACYclovir (VALTREX) 1,000 mg tablet    Ambulatory referral to Dermatology    Virus culture    Virus culture   2  Elevated blood pressure reading in office with diagnosis of hypertension     I advised patient to follow up with his PCP as soon as possible for likely resumption of hypertension treatment  Patient Instructions     Patient Instructions     Shingles   WHAT YOU NEED TO KNOW:   Shingles is a painful rash  Shingles is caused by the same virus that causes chickenpox (varicella-zoster)  After you get chickenpox, the virus stays in your body for several years without causing any symptoms  Shingles occurs when the virus becomes active again  The active virus travels along a nerve to your skin and causes a rash  DISCHARGE INSTRUCTIONS:   Call your local emergency number (911 in the [address] [place] [address],[address]) if:   · You have trouble moving your arms, legs, or face  · You become confused, or have difficulty speaking  · You have a seizure  Return to the emergency department if:   · You have weakness in an arm or leg  · You have dizziness, a severe headache, or hearing or vision loss  · You have painful, red, warm skin around the blisters, or the blisters drain pus  · Your neck is stiff or you have trouble moving it  Call your doctor if:   · You feel weak or have a headache  · You have a cough, chills, or a fever  · You have abdominal pain or nausea, or you are vomiting  · Your rash becomes more itchy or painful  · Your rash spreads to other parts of your body  · Your pain worsens and does not go away even after you take medicine  · You have questions or concerns about your condition or care  Medicines: You may need any of the following:  · Antiviral medicine  helps decrease symptoms and healing time   They may also decrease your risk of developing nerve pain  You will need to start taking them within 3 days of the start of symptoms to prevent nerve pain  · Prescription pain medicine  may be given  Ask your healthcare provider how to take this medicine safely  Some prescription pain medicines contain acetaminophen  Do not take other medicines that contain acetaminophen without talking to your healthcare provider  Too much acetaminophen may cause liver damage  Prescription pain medicine may cause constipation  Ask your healthcare provider how to prevent or treat constipation  · Acetaminophen  decreases pain and fever  It is available without a doctor's order  Ask how much to take and how often to take it  Follow directions  Read the labels of all other medicines you are using to see if they also contain acetaminophen, or ask your doctor or pharmacist  Acetaminophen can cause liver damage if not taken correctly  Do not use more than 4 grams (4,000 milligrams) total of acetaminophen in one day  · NSAIDs , such as ibuprofen, help decrease swelling, pain, and fever  This medicine is available with or without a doctor's order  NSAIDs can cause stomach bleeding or kidney problems in certain people  If you take blood thinner medicine, always ask if NSAIDs are safe for you  Always read the medicine label and follow directions  Do not give these medicines to children under 10months of age without direction from your child's healthcare provider  · Topical anesthetics  are used to numb the skin and decrease pain  They can be a cream, gel, spray, or patch  · Anticonvulsants  decrease nerve pain and may help you sleep at night  · Antidepressants  may be used to decrease nerve pain  · Take your medicine as directed  Contact your healthcare provider if you think your medicine is not helping or if you have side effects  Tell him of her if you are allergic to any medicine   Keep a list of the medicines, vitamins, and herbs you take  Include the amounts, and when and why you take them  Bring the list or the pill bottles to follow-up visits  Carry your medicine list with you in case of an emergency  Self-care:  Keep your rash clean and dry  Cover your rash with a bandage or clothing  Do not use bandages that stick to your skin  The sticky part may irritate your skin and make your rash last longer  Prevent the spread of germs:       · Wash your hands often  Wash your hands several times each day  Wash after you use the bathroom, change a child's diaper, and before you prepare or eat food  Use soap and water every time  Rub your soapy hands together, lacing your fingers  Wash the front and back of your hands, and in between your fingers  Use the fingers of one hand to scrub under the fingernails of the other hand  Wash for at least 20 seconds  Rinse with warm, running water for several seconds  Then dry your hands with a clean towel or paper towel  Use hand  that contains alcohol if soap and water are not available  Do not touch your eyes, nose, or mouth without washing your hands first          · Cover a sneeze or cough  Use a tissue that covers your mouth and nose  Throw the tissue away in a trash can right away  Use the bend of your arm if a tissue is not available  Wash your hands well with soap and water or use a hand   · Stay away from others while you are sick  Avoid crowds as much as possible  · Ask about vaccines you may need  Talk to your healthcare provider about your vaccine history  He or she will tell you which vaccines you need, and when to get them  Prevent shingles or another shingles outbreak:  A vaccine may be given to help prevent shingles  You can get the vaccine even if you already had shingles  The vaccine can help prevent a future outbreak  If you do get shingles again, the vaccine can keep it from becoming severe  The vaccine comes in 2 forms   Your healthcare provider will tell you which form is right for you  The decision is based on your age and any medical conditions you have  A 2-dose vaccine is usually given to adults 48 years or older  A 1-dose vaccine may be given to adults 61 years or older  For more information:   · Centers for Disease Control and Prevention  [address] , [address]  Phone: 0- 536 - 8611982  Phone: 7- 401 - 2545616  Web Address: [employer] com     Follow up with your doctor as directed:  Write down your questions so you remember to ask them during your visits  © Copyright [address] [place] [address] Drive Information is for End User's use only and may not be sold, redistributed or otherwise used for commercial purposes  All illustrations and images included in CareNotes® are the copyrighted property of A D A M , Inc  or [facility] Salazar   The above information is an  only  It is not intended as medical advice for individual conditions or treatments  Talk to your doctor, nurse or pharmacist before following any medical regimen to see if it is safe and effective for you  Hypertension   WHAT YOU NEED TO KNOW:   Hypertension is high blood pressure  Your blood pressure is the force of your blood moving against the walls of your arteries  Hypertension causes your blood pressure to get so high that your heart has to work much harder than normal  This can damage your heart  The cause of hypertension may not be known  This is called essential or primary hypertension  Hypertension caused by another medical condition, such as kidney disease, is called secondary hypertension  DISCHARGE INSTRUCTIONS:   Call 911 for any of the following:   · You have chest pain  · You have any of the following signs of a heart attack:      ?  Squeezing, pressure, or pain in your chest    ? You may  also have any of the following:     § Discomfort or pain in your back, neck, jaw, stomach, or arm    § Shortness of breath    § Nausea or vomiting    § Lightheadedness or a sudden cold sweat    · You become confused or have difficulty speaking  · You suddenly feel lightheaded or have trouble breathing  Return to the emergency department if:   · You have a severe headache or vision loss  · You have weakness in an arm or leg  Contact your healthcare provider if:   · You feel faint, dizzy, confused, or drowsy  · You have been taking your blood pressure medicine but your pressure is higher than your provider says it should be  · You have questions or concerns about your condition or care  Medicines: You may  need any of the following:  · Antihypertensives  may be used to help lower your blood pressure  Several kinds of medicines are available  Your healthcare provider will choose medicines based on the kind of hypertension you have  You may need more than one type of medicine  Take the medicine exactly as directed  · Diuretics  help decrease extra fluid that collects in your body  This will help lower your blood pressure  You may urinate more often while you take this medicine  · Cholesterol medicine  helps lower your cholesterol level  A low cholesterol level helps prevent heart disease and makes it easier to control your blood pressure  · Take your medicine as directed  Contact your healthcare provider if you think your medicine is not helping or if you have side effects  Tell him or her if you are allergic to any medicine  Keep a list of the medicines, vitamins, and herbs you take  Include the amounts, and when and why you take them  Bring the list or the pill bottles to follow-up visits  Carry your medicine list with you in case of an emergency  Follow up with your healthcare provider as directed: You will need to return to have your blood pressure checked and to have other lab tests done  Write down your questions so you remember to ask them during your visits     Stages of hypertension:       · Normal blood pressure is 119/79 or lower   Your healthcare provider may only check your blood pressure each year if it stays at a normal level  · Elevated blood pressure is 120/79 to 129/79   This is sometimes called prehypertension  Your healthcare provider may suggest lifestyle changes to help lower your blood pressure to a normal level  He or she may then check it again in 3 to 6 months  · Stage 1 hypertension is 130/80  to 139/89   Your provider may recommend lifestyle changes, medication, and checks every 3 to 6 months until your blood pressure is controlled  · Stage 2 hypertension is 140/90 or higher   Your provider will recommend lifestyle changes and have you take 2 kinds of hypertension medicines  You will also need to have your blood pressure checked monthly until it is controlled  Manage hypertension:   · Check your blood pressure at home  Avoid smoking, caffeine, and exercise at least 30 minutes before checking your blood pressure  Sit and rest for 5 minutes before you take your blood pressure  Extend your arm and support it on a flat surface  Your arm should be at the same level as your heart  Follow the directions that came with your blood pressure monitor  Check your blood pressure 2 times, 1 minute apart, before you take your medicine in the morning  Also check your blood pressure before your evening meal  Keep a record of your readings and bring it to your follow-up visits  Ask your healthcare provider what your blood pressure should be  · Manage any other health conditions you have  Health conditions such as diabetes can increase your risk for hypertension  Follow your healthcare provider's instructions and take all your medicines as directed  · Ask about all medicines  Certain medicines can increase your blood pressure  Examples include oral birth control pills, decongestants, herbal supplements, and NSAIDs, such as ibuprofen   Your healthcare provider can tell you which medicines are safe for you to take  This includes prescription and over-the-counter medicines  Lifestyle changes you can make to manage hypertension:   · Limit sodium (salt) as directed  Too much sodium can affect your fluid balance  Check labels to find low-sodium or no-salt-added foods  Some low-sodium foods use potassium salts for flavor  Too much potassium can also cause health problems  Your healthcare provider will tell you how much sodium and potassium are safe for you to have in a day  He or she may recommend that you limit sodium to 2,300 mg a day  · Follow the meal plan recommended by your healthcare provider  A dietitian or your provider can give you more information on low-sodium plans or the DASH (Dietary Approaches to Stop Hypertension) eating plan  The DASH plan is low in sodium, unhealthy fats, and total fat  It is high in potassium, calcium, and fiber  · Exercise to maintain a healthy weight  Exercise at least 30 minutes per day, on most days of the week  This will help decrease your blood pressure  Ask your healthcare provider about the best exercise plan for you  · Decrease stress  This may help lower your blood pressure  Learn ways to relax, such as deep breathing or listening to music  · Limit alcohol as directed  Alcohol can increase your blood pressure  A drink of alcohol is 12 ounces of beer, 5 ounces of wine, or 1½ ounces of liquor  · Do not smoke  Nicotine and other chemicals in cigarettes and cigars can increase your blood pressure and also cause lung damage  Ask your healthcare provider for information if you currently smoke and need help to quit  E-cigarettes or smokeless tobacco still contain nicotine  Talk to your healthcare provider before you use these products  © Copyright [address] Information is for End User's use only and may not be sold, redistributed or otherwise used for commercial purposes   All illustrations and images included in CareNotes® are the copyrighted property of [employer] M , Inc  or [address]   The above information is an  only  It is not intended as medical advice for individual conditions or treatments  Talk to your doctor, nurse or pharmacist before following any medical regimen to see if it is safe and effective for you  Follow up with PCP in 3-5 days  Proceed to  ER if symptoms worsen  Chief Complaint     Chief Complaint   Patient presents with    Rash     has a rash on left posterior thigh for the past 2 months  Does not itch or burn  History of Present Illness       Patient complains of rash posterior left thigh waxing waning for past 2 months  Rash is nonpruritic  Patient has been treated for rashes 3 times in recent years with either topical steroid or antifungal cream   He claims present rash began as a cluster of blisters  He also notes that he developed sciatica at around the same time the rash developed  He has been going to physical therapy for several months for left low back pain which seem to be improving until the sciatica started  Patient claims he has a remote history of hypertension treated with medication but then that treatment was stopped after about a year and patient was told he did not need any further blood pressure treatment at that time  He claims over the past year he has been going through a divorce and has quite a bit of stress at present related to that  Review of Systems   Review of Systems   Constitutional: Negative for chills and fever  Respiratory: Negative for cough, shortness of breath, wheezing and stridor  Cardiovascular: Negative for chest pain, palpitations and leg swelling  Musculoskeletal: Negative for arthralgias, joint swelling, myalgias and neck stiffness  Skin: Positive for rash  Neurological: Negative for dizziness, syncope and headaches           Current Medications       Current Outpatient Medications:    allopurinol (ZYLOPRIM) 300 mg tablet, Take 300 mg by mouth daily, Disp: , Rfl:     Cinnamon 500 MG TABS, Take 1 tablet by mouth daily, Disp: , Rfl:     Crisaborole (Eucrisa) 2 % OINT, Apply topically, Disp: , Rfl:     Multiple Vitamins-Minerals (MULTIVITAMIN WITH MINERALS) tablet, Take 1 tablet by mouth daily, Disp: , Rfl:     nystatin-triamcinolone (MYCOLOG-II) cream, Apply topically Three times a day, Disp: , Rfl:     saccharomyces boulardii (FLORASTOR) 250 mg capsule, Take 250 mg by mouth 2 (two) times a day, Disp: , Rfl:     valACYclovir (VALTREX) 1,000 mg tablet, Take 1 tablet (1,000 mg total) by mouth 3 (three) times a day for 7 days, Disp: 21 tablet, Rfl: 0    Current Allergies     Allergies as of 03/01/2021 - Reviewed 03/01/2021   Allergen Reaction Noted    Kiwi extract Tongue Swelling 04/05/2018            The following portions of the patient's history were reviewed and updated as appropriate: allergies, current medications, past family history, past medical history, past social history, past surgical history and problem list      Past Medical History:   Diagnosis Date    Allergic     Diabetes mellitus ([address] )     Eczema     Gout     Sinus infection        Past Surgical History:   Procedure Laterality Date    DENTAL SURGERY      KNEE SURGERY Left     NOSE SURGERY      WRIST FRACTURE SURGERY         Family History   Problem Relation Age of Onset    Heart disease Mother     Diabetes Mother     Parkinsonism Father     Heart disease Father          Medications have been verified  Objective   BP (!) 186/124   Pulse 92   Temp 98 °F (36 7 °C)   Resp 16   Ht 6' 3" (1 905 m)   Wt 95 3 kg (210 lb)   SpO2 99%   BMI 26 25 kg/m²        Physical Exam     Physical Exam  Vitals signs and nursing note reviewed  Constitutional:       General: He is not in acute distress  Appearance: He is well-developed  HENT:      Head: Normocephalic and atraumatic     Eyes:      Conjunctiva/sclera: Conjunctivae normal       Pupils: Pupils are equal, round, and reactive to light  Funduscopic exam:     Right eye: No hemorrhage or papilledema  Left eye: No hemorrhage or papilledema  Comments: Discs flat with sharp borders, no hemorrhages or exudates  Neck:      Musculoskeletal: Normal range of motion and neck supple  Cardiovascular:      Rate and Rhythm: Normal rate and regular rhythm  Pulses: Normal pulses  Pulmonary:      Effort: Pulmonary effort is normal       Breath sounds: Normal breath sounds  Musculoskeletal:         General: No tenderness or deformity  Skin:     General: Skin is warm and dry  Findings: Rash present  No erythema  Comments: Erythematous papulovesicular rash in dermatomal distribution S1 left posterior thigh proximal aspect   Neurological:      Mental Status: He is alert and oriented to person, place, and time  Deep Tendon Reflexes: Reflexes are normal and symmetric  Psychiatric:         Mood and Affect: Mood normal          Behavior: Behavior normal          Thought Content:  Thought content normal          Judgment: Judgment normal

## 2021-03-16 ENCOUNTER — TELEPHONE (OUTPATIENT)
Dept: URGENT CARE | Facility: CLINIC | Age: 41
End: 2021-03-16

## 2022-02-24 ENCOUNTER — APPOINTMENT (EMERGENCY)
Dept: RADIOLOGY | Facility: HOSPITAL | Age: 42
End: 2022-02-24
Payer: COMMERCIAL

## 2022-02-24 ENCOUNTER — HOSPITAL ENCOUNTER (EMERGENCY)
Facility: HOSPITAL | Age: 42
Discharge: HOME | End: 2022-02-24
Attending: EMERGENCY MEDICINE | Admitting: EMERGENCY MEDICINE
Payer: COMMERCIAL

## 2022-02-24 VITALS
TEMPERATURE: 97.3 F | WEIGHT: 215 LBS | OXYGEN SATURATION: 96 % | HEART RATE: 107 BPM | BODY MASS INDEX: 26.87 KG/M2 | RESPIRATION RATE: 16 BRPM | DIASTOLIC BLOOD PRESSURE: 103 MMHG | SYSTOLIC BLOOD PRESSURE: 172 MMHG

## 2022-02-24 DIAGNOSIS — S89.90XA KNEE INJURY: ICD-10-CM

## 2022-02-24 DIAGNOSIS — S93.409A ANKLE SPRAIN: Primary | ICD-10-CM

## 2022-02-24 PROCEDURE — 99284 EMERGENCY DEPT VISIT MOD MDM: CPT | Performed by: EMERGENCY MEDICINE

## 2022-02-24 PROCEDURE — 99283 EMERGENCY DEPT VISIT LOW MDM: CPT

## 2022-02-24 PROCEDURE — 73564 X-RAY EXAM KNEE 4 OR MORE: CPT

## 2022-02-24 PROCEDURE — 73610 X-RAY EXAM OF ANKLE: CPT

## 2022-02-24 PROCEDURE — 90715 TDAP VACCINE 7 YRS/> IM: CPT | Performed by: EMERGENCY MEDICINE

## 2022-02-24 PROCEDURE — 90471 IMMUNIZATION ADMIN: CPT

## 2022-02-24 RX ORDER — HYDROCHLOROTHIAZIDE 25 MG/1
25 TABLET ORAL DAILY
COMMUNITY

## 2022-02-24 RX ADMIN — TETANUS TOXOID, REDUCED DIPHTHERIA TOXOID AND ACELLULAR PERTUSSIS VACCINE, ADSORBED 0.5 ML: 5; 2.5; 8; 8; 2.5 SUSPENSION INTRAMUSCULAR at 13:39

## 2022-02-24 NOTE — Clinical Note
Zuly Mcghee was seen and treated in our emergency department on 2/24/2022  Diagnosis:     Corinne Dawson  may return to work on return date  He may return on this date: 02/28/2022         If you have any questions or concerns, please don't hesitate to call        Florecita Zelaya, DO    ______________________________           _______________          _______________  Hospital Representative                              Date                                Time

## 2022-02-24 NOTE — ED PROVIDER NOTES
History  Chief Complaint   Patient presents with    Ankle Pain     stepped on side of the sidewalk turning his foot inward, is able to walk on it,      69-year-old male complains right ankle and left knee pain, injured last night, walking on uneven driveway  Also bumped left shoulder and head  No head or shoulder discomfort  No oral anticoagulation  History provided by:  Patient  Ankle Pain  Location:  Ankle  Ankle location:  R ankle  Pain details:     Quality:  Aching    Severity:  Moderate    Onset quality:  Sudden    Timing:  Constant    Progression:  Unchanged  Chronicity:  New  Dislocation: no    Tetanus status:  Unknown  Relieved by:  NSAIDs  Worsened by:  Extension      Prior to Admission Medications   Prescriptions Last Dose Informant Patient Reported? Taking?    Cinnamon 500 MG TABS 2/23/2022 at Unknown time Self Yes Yes   Sig: Take 1 tablet by mouth daily   Crisaborole Wanjessica Bowman) 2 % OINT 2/23/2022 at Unknown time  Yes Yes   Sig: Apply topically   Multiple Vitamins-Minerals (MULTIVITAMIN WITH MINERALS) tablet 2/23/2022 at Unknown time Self Yes Yes   Sig: Take 1 tablet by mouth daily   Potassium 75 MG TABS 2/23/2022 at Unknown time  Yes Yes   Sig: Take 75 mg by mouth daily   allopurinol (ZYLOPRIM) 300 mg tablet 2/24/2022 at Unknown time Self Yes Yes   Sig: Take 300 mg by mouth daily   hydrochlorothiazide (HYDRODIURIL) 25 mg tablet 2/24/2022 at Unknown time  Yes Yes   Sig: Take 25 mg by mouth daily   nystatin-triamcinolone (MYCOLOG-II) cream   Yes No   Sig: Apply topically Three times a day   saccharomyces boulardii (FLORASTOR) 250 mg capsule 2/23/2022 at Unknown time  Yes Yes   Sig: Take 250 mg by mouth 2 (two) times a day   valACYclovir (VALTREX) 1,000 mg tablet   No No   Sig: Take 1 tablet (1,000 mg total) by mouth 3 (three) times a day for 7 days      Facility-Administered Medications: None       Past Medical History:   Diagnosis Date    Allergic     Diabetes mellitus (HCC)     Eczema     Gout  Sinus infection        Past Surgical History:   Procedure Laterality Date    DENTAL SURGERY      KNEE SURGERY Left     NOSE SURGERY      WRIST FRACTURE SURGERY         Family History   Problem Relation Age of Onset    Heart disease Mother     Diabetes Mother     Parkinsonism Father     Heart disease Father      I have reviewed and agree with the history as documented  E-Cigarette/Vaping    E-Cigarette Use Never User      E-Cigarette/Vaping Substances     Social History     Tobacco Use    Smoking status: Former Smoker     Types: Cigarettes     Quit date: 2012     Years since quitting: 10 1    Smokeless tobacco: Former User     Types: Chew   Vaping Use    Vaping Use: Never used   Substance Use Topics    Alcohol use: Yes     Comment: socially     Drug use: Never       Review of Systems   All other systems reviewed and are negative  Physical Exam  Physical Exam  Vitals and nursing note reviewed  Constitutional:       Appearance: Normal appearance  HENT:      Head: Normocephalic and atraumatic  Comments: Nontender, no swelling or abrasion  Eyes:      Conjunctiva/sclera: Conjunctivae normal       Pupils: Pupils are equal, round, and reactive to light  Cardiovascular:      Rate and Rhythm: Normal rate  Pulses: Normal pulses  Pulmonary:      Effort: Pulmonary effort is normal    Musculoskeletal:      Cervical back: No rigidity or tenderness  Comments: Left knee mild anterior inferior tenderness, no laxity, no effusion changes    Right ankle tender medially, minimal swelling, tender with extension, bilateral movement, no laxity    Lower extremities neurovascular intact    No cervical, thoracic or lumbar spinous process tenderness or deformity   Skin:     General: Skin is warm and dry  Comments: Left anterior inferior knee area with mild swelling and 2 x 4 irregular superficial abrasion, generally nontender   Neurological:      General: No focal deficit present  Mental Status: He is alert and oriented to person, place, and time  Psychiatric:         Mood and Affect: Mood normal          Behavior: Behavior normal          Vital Signs  ED Triage Vitals [02/24/22 1322]   Temperature Pulse Respirations Blood Pressure SpO2   (!) 97 3 °F (36 3 °C) (!) 107 16 (!) 172/103 96 %      Temp Source Heart Rate Source Patient Position - Orthostatic VS BP Location FiO2 (%)   Temporal Monitor Sitting Right arm --      Pain Score       3           Vitals:    02/24/22 1322   BP: (!) 172/103   Pulse: (!) 107   Patient Position - Orthostatic VS: Sitting         Visual Acuity      ED Medications  Medications   tetanus-diphtheria-acellular pertussis (BOOSTRIX) IM injection 0 5 mL (0 5 mL Intramuscular Given 2/24/22 1339)       Diagnostic Studies  Results Reviewed     None                 XR ankle 3+ views RIGHT   ED Interpretation by Norman De La Fuente DO (02/24 1403)   No fracture      XR knee 4+ vw left injury   ED Interpretation by Norman De La Fuente DO (02/24 1403)   No fracture                 Procedures  Procedures         ED Course  ED Course as of 02/24/22 1405   Thu Feb 24, 2022   1405 Informed of results, radiology follow-up and agrees with close outpatient follow-up his family physician, will return immediately if worse or new symptoms                                             MDM    Disposition  Final diagnoses: Ankle sprain   Knee injury     Time reflects when diagnosis was documented in both MDM as applicable and the Disposition within this note     Time User Action Codes Description Comment    2/24/2022  2:03 PM Doreen Mejia [[id number]] Ankle sprain     2/24/2022  2:03 PM Doreen Mejia [[id number]] Knee injury       ED Disposition     ED Disposition Condition Date/Time Comment    Discharge Stable Thu Feb 24, 2022  2:03 PM Yamileth Dior discharge to home/self care              Follow-up Information     Follow up With Specialties Details Why Behzad Ainsley Magallanes MD Internal Medicine Schedule an appointment as soon as possible for a visit in 1 week  [address]  [address]  [phone]            Patient's Medications   Discharge Prescriptions    No medications on file       No discharge procedures on file      PDMP Review     None          ED Provider  Electronically Signed by           Norman De La Fuente DO  02/24/22 9817

## 2022-03-13 ENCOUNTER — OFFICE VISIT (OUTPATIENT)
Dept: URGENT CARE | Facility: CLINIC | Age: 42
End: 2022-03-13
Payer: COMMERCIAL

## 2022-03-13 VITALS
WEIGHT: 215 LBS | HEIGHT: 75 IN | OXYGEN SATURATION: 99 % | RESPIRATION RATE: 16 BRPM | TEMPERATURE: 98 F | HEART RATE: 100 BPM | BODY MASS INDEX: 26.73 KG/M2

## 2022-03-13 DIAGNOSIS — J01.00 ACUTE MAXILLARY SINUSITIS, RECURRENCE NOT SPECIFIED: Primary | ICD-10-CM

## 2022-03-13 LAB — S PYO AG THROAT QL: NEGATIVE

## 2022-03-13 PROCEDURE — 87880 STREP A ASSAY W/OPTIC: CPT | Performed by: EMERGENCY MEDICINE

## 2022-03-13 PROCEDURE — 99213 OFFICE O/P EST LOW 20 MIN: CPT | Performed by: EMERGENCY MEDICINE

## 2022-03-13 RX ORDER — DOXYCYCLINE 100 MG/1
100 TABLET ORAL 2 TIMES DAILY
Qty: 20 TABLET | Refills: 0 | Status: SHIPPED | OUTPATIENT
Start: 2022-03-13 | End: 2022-03-23

## 2022-03-13 RX ORDER — GLIPIZIDE 5 MG/1
5 TABLET ORAL DAILY
COMMUNITY
Start: 2022-01-27

## 2022-03-13 NOTE — PATIENT INSTRUCTIONS
Take an expectorant - guaifenesin should be the only ingredient - during the day, and the cough suppressant (ex  Robitussin DM or Tessalon) if needed at night only  Take Zinc 50 mg every 12 hours for the next week  You should also take vitamin D3 5000 i u s per day for the next 1 week, and vitamin-C 1 g daily  May take Flonase as discussed  You may also take a decongestant like Sudafed, unless you have hypertension or cardiac disease    Sinusitis, Ambulatory Care   GENERAL INFORMATION:   Sinusitis  is inflammation or infection of your sinuses  It is most often caused by a virus  Acute sinusitis may last up to 12 weeks  Chronic sinusitis lasts longer than 12 weeks  Recurrent sinusitis is when you have 3 or more episodes of sinusitis in 1 year  Common symptoms include the following:   · Fever    · Pain, pressure, redness, or swelling around the forehead, cheeks, or eyes    · Thick yellow or green discharge from your nose    · Tenderness when you touch your face over your sinuses    · Dry cough that happens mostly at night or when you lie down    · Headache and face pain that is worse when you lean forward    · Teeth pain or pain when you chew  Seek immediate care for the following symptoms:   · Vision changes such as double vision    · Confusion or trouble thinking clearly    · Headache and stiff neck    · Trouble breathing  Treatment for sinusitis  may include medicines to relieve nasal and sinus congestion or to decrease pain and fever  Ask your healthcare provider which medicines you should take and how much is safe  Manage sinusitis:   · Drink liquids as directed  Ask your healthcare provider how much liquid to drink each day and which liquids are best for you  Liquids will help loosen and drain the mucus in your sinuses  · Breathe in steam   Heat a bowl of water until you see steam  Lean over the bowl and make a tent over your head with a large towel  Breathe deeply for about 20 minutes   Be careful not to get too close to the steam or burn yourself  Do this 3 times a day  You can also breathe deeply when you take a hot shower  · Rinse your sinuses  Use a sinus rinse device to rinse your nasal passages with a saline (salt water) solution  This will help thin the mucus in your nose and rinse away pollen and dirt  It will also help reduce swelling so you can breathe normally  Ask how often to do this  · Use heat on your sinuses  to decrease pain  Apply heat for 15 to 20 minutes every hour for as many days as directed  · Sleep with your head elevated  Place an extra pillow under your head before you go to sleep to help your sinuses drain  · Do not smoke and avoid secondhand smoke  If you smoke, it is never too late to quit  Ask for information about how to stop smoking if you need help  Prevent the spread of germs that cause sinusitis:  Wash your hands often with soap and water  Wash your hands after you use the bathroom, change a child's diaper, or sneeze  Wash your hands before you prepare or eat food  Follow up with your healthcare provider as directed:  Write down your questions so you remember to ask them during your visits  CARE AGREEMENT:   You have the right to help plan your care  Learn about your health condition and how it may be treated  Discuss treatment options with your caregivers to decide what care you want to receive  You always have the right to refuse treatment  The above information is an  only  It is not intended as medical advice for individual conditions or treatments  Talk to your doctor, nurse or pharmacist before following any medical regimen to see if it is safe and effective for you  © 2014 [address] is for End User's use only and may not be sold, redistributed or otherwise used for commercial purposes   All illustrations and images included in CareNotes® are the copyrighted property of [employer]  or [employer]  Doxycycline (By mouth)   Doxycycline ([id number])  Treats and prevents infections  Also used to prevent malaria and treat rosacea or severe acne  This medicine is a tetracycline antibiotic  Brand Name(s): Acticlate, Adoxa, Adoxa Scott 1/150, Avidoxy, Doryx, Doryx MPC, LymePak, Mondoxyne NL, Monodox, Morgidox [id number], Morgidox [id number], Oracea, Targadox, Vibramycin Calcium, Vibramycin Hyclate   There may be other brand names for this medicine  When This Medicine Should Not Be Used: This medicine is not right for everyone  Do not use it if you had an allergic reaction to doxycycline or another tetracycline antibiotic, or if you are pregnant or breastfeeding  How to Use This Medicine:   Capsule, Delayed Release Capsule, Long Acting Capsule, Liquid, Tablet, Delayed Release Tablet  · Your doctor will tell you how much medicine to use  Do not use more than directed  · Ask your pharmacist or doctor if you need to take this medicine with or without food  Some forms can be taken with food or milk, but others must be taken on an empty stomach  · Capsule: Swallow whole  Do not break, crush, chew, or open it  ? Oracea® capsules: This medicine must be taken on an empty stomach, at least 1 hour before or 2 hours after a meal   ? Acticlate® Cap capsules: You may take this medicine with food or milk to avoid stomach irritation  · Delayed-release capsules: You may also take this medicine by sprinkling the open capsules onto cold, soft applesauce  Do not lose any pellets when transferring the contents  Swallow this mixture right away  Do not chew it  Do not store the mixture for later use  You may take this medicine with food or milk to avoid stomach upset  · Delayed-release tablets: You may also take this medicine by sprinkling the broken tablets onto room-temperature applesauce  Swallow this mixture right away  Do not chew it  Do not store the mixture for later use   You may take this medicine with food or milk to avoid stomach upset  · Oral liquid: Shake the bottle well just before each use  Measure the oral liquid medicine with a marked measuring spoon, oral syringe, or medicine cup  · Tablets: You may take this medicine with food or milk to avoid stomach irritation  To break a tablet, hold the tablet between your thumb and index fingers close to the appropriate scored line  Then, apply enough pressure to snap the tablet segments apart  Do not use the tablet if it does not break on the scored lines  · Take all of the medicine in your prescription to clear up your infection, even if you feel better after the first few doses  · Drink plenty of fluids to avoid throat problems, if you take the capsule or tablet form  · Malaria prevention: Start taking the medicine 1 or 2 days before you travel  Take the medicine every day during your trip  Keep taking it for 4 weeks after you return  However, do not use the medicine for longer than 4 months  · Do not use this medicine for more than 9 months if you are using it for rosacea  · Use only the brand of medicine your doctor prescribed  Other brands may not work the same way  · Read and follow the patient instructions that come with this medicine  Talk to your doctor or pharmacist if you have any questions  · Missed dose: Take a dose as soon as you remember  If it is almost time for your next dose, wait until then and take a regular dose  Do not take extra medicine to make up for a missed dose  · Store the medicine in a closed container at room temperature, away from heat, moisture, and direct light  Do not freeze the oral liquid  Drugs and Foods to Avoid:   Ask your doctor or pharmacist before using any other medicine, including over-the-counter medicines, vitamins, and herbal products  · Some medicines can affect how doxycycline works  Tell your doctor if you are using any of the following:  ? Bismuth subsalicylate  ?  Acne medicines (including isotretinoin)  ? Birth control pills  ? Blood thinner (including warfarin)  ? Medicine for seizures (including carbamazepine, phenobarbital, phenytoin)  ? Medicine that contains aluminum, calcium, magnesium, or iron (including an antacid or vitamin supplement)  ? Medicine to treat psoriasis (including acitretin)  ? Penicillin antibiotic  ? Stomach medicine  Warnings While Using This Medicine:   · This medicine may cause birth defects if either partner is using it during conception or pregnancy  Tell your doctor right away if you or your partner becomes pregnant  Birth control pills may not work as well when used together with this medicine  Use a second form of birth control to keep from getting pregnant  · Tell your doctor if you have kidney disease, liver disease, asthma, or an allergy to sulfites  Tell your doctor if you had surgery on your stomach, or if you have a history of yeast infections  · This medicine may cause the following problems:  ? Permanent change in tooth color (in children younger than 6years of age)  ? Serious skin reactions, including drug reaction with eosinophilia and systemic symptoms (DRESS)  ? Increased pressure inside the head  ? Yeast infection  ? Immune system problems  · This medicine can cause diarrhea  Call your doctor if the diarrhea becomes severe, does not stop, or is bloody  Do not take any medicine to stop diarrhea until you have talked to your doctor  Diarrhea can occur 2 months or more after you stop taking this medicine  · This medicine may make your skin more sensitive to sunlight  Wear sunscreen  Do not use sunlamps or tanning beds  · Tell any doctor or dentist who treats you that you are using this medicine  This medicine may affect certain medical test results  · Call your doctor if your symptoms do not improve or if they get worse  · Your doctor will do lab tests at regular visits to check on the effects of this medicine  Keep all appointments    · Keep all medicine out of the reach of children  Never share your medicine with anyone  Possible Side Effects While Using This Medicine:   Call your doctor right away if you notice any of these side effects:  · Allergic reaction: Itching or hives, swelling in your face or hands, swelling or tingling in your mouth or throat, chest tightness, trouble breathing  · Blistering, peeling, red skin rash  · Burning, pain, or irritation in your upper stomach or throat  · Diarrhea that may contain blood  · Fever, chills, cough, runny or stuffy nose, sore throat, body aches  · Joint pain, unusual tiredness or weakness  · Severe headache, dizziness, vision changes  · Sudden and severe stomach pain, nausea, vomiting, lightheadedness  · Swollen, painful, or tender lymph glands in the neck, armpit, or groin, or yellow skin or eyes  If you notice these less serious side effects, talk with your doctor:   · Darkening of your skin, scars, teeth, or gums  · Sores or white patches on your lips, mouth, or throat  If you notice other side effects that you think are caused by this medicine, tell your doctor  Call your doctor for medical advice about side effects  You may report side effects to FDA at 4-186-FDA-5961    © Copyright [employer] 2022 Information is for End User's use only and may not be sold, redistributed or otherwise used for commercial purposes  The above information is an  only  It is not intended as medical advice for individual conditions or treatments  Talk to your doctor, nurse or pharmacist before following any medical regimen to see if it is safe and effective for you

## 2022-03-13 NOTE — PROGRESS NOTES
[facility]        NAME: Lacie Iglesias is a 35 y o  male  : 1980    MRN: [id number]  DATE: 2022  TIME: 3:20 PM    Assessment and Plan   Acute maxillary sinusitis, recurrence not specified [J01 00]  1  Acute maxillary sinusitis, recurrence not specified  POCT rapid strepA    doxycycline (ADOXA) 100 MG tablet         Patient Instructions     Patient Instructions   Take an expectorant - guaifenesin should be the only ingredient - during the day, and the cough suppressant (ex  Robitussin DM or Tessalon) if needed at night only  Take Zinc 50 mg every 12 hours for the next week  You should also take vitamin D3 5000 i u s per day for the next 1 week, and vitamin-C 1 g daily  May take Flonase as discussed  You may also take a decongestant like Sudafed, unless you have hypertension or cardiac disease    Sinusitis, Ambulatory Care   GENERAL INFORMATION:   Sinusitis  is inflammation or infection of your sinuses  It is most often caused by a virus  Acute sinusitis may last up to 12 weeks  Chronic sinusitis lasts longer than 12 weeks  Recurrent sinusitis is when you have 3 or more episodes of sinusitis in 1 year  Common symptoms include the following:   · Fever    · Pain, pressure, redness, or swelling around the forehead, cheeks, or eyes    · Thick yellow or green discharge from your nose    · Tenderness when you touch your face over your sinuses    · Dry cough that happens mostly at night or when you lie down    · Headache and face pain that is worse when you lean forward    · Teeth pain or pain when you chew  Seek immediate care for the following symptoms:   · Vision changes such as double vision    · Confusion or trouble thinking clearly    · Headache and stiff neck    · Trouble breathing  Treatment for sinusitis  may include medicines to relieve nasal and sinus congestion or to decrease pain and fever   Ask your healthcare provider which medicines you should take and how much is safe   Manage sinusitis:   · Drink liquids as directed  Ask your healthcare provider how much liquid to drink each day and which liquids are best for you  Liquids will help loosen and drain the mucus in your sinuses  · Breathe in steam   Heat a bowl of water until you see steam  Lean over the bowl and make a tent over your head with a large towel  Breathe deeply for about 20 minutes  Be careful not to get too close to the steam or burn yourself  Do this 3 times a day  You can also breathe deeply when you take a hot shower  · Rinse your sinuses  Use a sinus rinse device to rinse your nasal passages with a saline (salt water) solution  This will help thin the mucus in your nose and rinse away pollen and dirt  It will also help reduce swelling so you can breathe normally  Ask how often to do this  · Use heat on your sinuses  to decrease pain  Apply heat for 15 to 20 minutes every hour for as many days as directed  · Sleep with your head elevated  Place an extra pillow under your head before you go to sleep to help your sinuses drain  · Do not smoke and avoid secondhand smoke  If you smoke, it is never too late to quit  Ask for information about how to stop smoking if you need help  Prevent the spread of germs that cause sinusitis:  Wash your hands often with soap and water  Wash your hands after you use the bathroom, change a child's diaper, or sneeze  Wash your hands before you prepare or eat food  Follow up with your healthcare provider as directed:  Write down your questions so you remember to ask them during your visits  CARE AGREEMENT:   You have the right to help plan your care  Learn about your health condition and how it may be treated  Discuss treatment options with your caregivers to decide what care you want to receive  You always have the right to refuse treatment  The above information is an  only   It is not intended as medical advice for individual conditions or treatments  Talk to your doctor, nurse or pharmacist before following any medical regimen to see if it is safe and effective for you  © 2014 [address] is for End User's use only and may not be sold, redistributed or otherwise used for commercial purposes  All illustrations and images included in CareNotes® are the copyrighted property of [employer]  or Willis Odonnell  Doxycycline (By mouth)   Doxycycline ([id number]-[id number])  Treats and prevents infections  Also used to prevent malaria and treat rosacea or severe acne  This medicine is a tetracycline antibiotic  Brand Name(s): Acticlate, Adoxa, Adoxa Scott 1/150, Avidoxy, Doryx, Doryx MPC, LymePak, Mondoxyne NL, Monodox, Morgidox 3B222SU, Morgidox 3T622OH, Oracea, Targadox, Vibramycin Calcium, Vibramycin Hyclate   There may be other brand names for this medicine  When This Medicine Should Not Be Used: This medicine is not right for everyone  Do not use it if you had an allergic reaction to doxycycline or another tetracycline antibiotic, or if you are pregnant or breastfeeding  How to Use This Medicine:   Capsule, Delayed Release Capsule, Long Acting Capsule, Liquid, Tablet, Delayed Release Tablet  · Your doctor will tell you how much medicine to use  Do not use more than directed  · Ask your pharmacist or doctor if you need to take this medicine with or without food  Some forms can be taken with food or milk, but others must be taken on an empty stomach  · Capsule: Swallow whole  Do not break, crush, chew, or open it  ? Oracea® capsules: This medicine must be taken on an empty stomach, at least 1 hour before or 2 hours after a meal   ? Acticlate® Cap capsules: You may take this medicine with food or milk to avoid stomach irritation  · Delayed-release capsules: You may also take this medicine by sprinkling the open capsules onto cold, soft applesauce  Do not lose any pellets when transferring the contents   Swallow this mixture right away  Do not chew it  Do not store the mixture for later use  You may take this medicine with food or milk to avoid stomach upset  · Delayed-release tablets: You may also take this medicine by sprinkling the broken tablets onto room-temperature applesauce  Swallow this mixture right away  Do not chew it  Do not store the mixture for later use  You may take this medicine with food or milk to avoid stomach upset  · Oral liquid: Shake the bottle well just before each use  Measure the oral liquid medicine with a marked measuring spoon, oral syringe, or medicine cup  · Tablets: You may take this medicine with food or milk to avoid stomach irritation  To break a tablet, hold the tablet between your thumb and index fingers close to the appropriate scored line  Then, apply enough pressure to snap the tablet segments apart  Do not use the tablet if it does not break on the scored lines  · Take all of the medicine in your prescription to clear up your infection, even if you feel better after the first few doses  · Drink plenty of fluids to avoid throat problems, if you take the capsule or tablet form  · Malaria prevention: Start taking the medicine 1 or 2 days before you travel  Take the medicine every day during your trip  Keep taking it for 4 weeks after you return  However, do not use the medicine for longer than 4 months  · Do not use this medicine for more than 9 months if you are using it for rosacea  · Use only the brand of medicine your doctor prescribed  Other brands may not work the same way  · Read and follow the patient instructions that come with this medicine  Talk to your doctor or pharmacist if you have any questions  · Missed dose: Take a dose as soon as you remember  If it is almost time for your next dose, wait until then and take a regular dose  Do not take extra medicine to make up for a missed dose    · Store the medicine in a closed container at room temperature, away from heat, moisture, and direct light  Do not freeze the oral liquid  Drugs and Foods to Avoid:   Ask your doctor or pharmacist before using any other medicine, including over-the-counter medicines, vitamins, and herbal products  · Some medicines can affect how doxycycline works  Tell your doctor if you are using any of the following:  ? Bismuth subsalicylate  ? Acne medicines (including isotretinoin)  ? Birth control pills  ? Blood thinner (including warfarin)  ? Medicine for seizures (including carbamazepine, phenobarbital, phenytoin)  ? Medicine that contains aluminum, calcium, magnesium, or iron (including an antacid or vitamin supplement)  ? Medicine to treat psoriasis (including acitretin)  ? Penicillin antibiotic  ? Stomach medicine  Warnings While Using This Medicine:   · This medicine may cause birth defects if either partner is using it during conception or pregnancy  Tell your doctor right away if you or your partner becomes pregnant  Birth control pills may not work as well when used together with this medicine  Use a second form of birth control to keep from getting pregnant  · Tell your doctor if you have kidney disease, liver disease, asthma, or an allergy to sulfites  Tell your doctor if you had surgery on your stomach, or if you have a history of yeast infections  · This medicine may cause the following problems:  ? Permanent change in tooth color (in children younger than 6years of age)  ? Serious skin reactions, including drug reaction with eosinophilia and systemic symptoms (DRESS)  ? Increased pressure inside the head  ? Yeast infection  ? Immune system problems  · This medicine can cause diarrhea  Call your doctor if the diarrhea becomes severe, does not stop, or is bloody  Do not take any medicine to stop diarrhea until you have talked to your doctor  Diarrhea can occur 2 months or more after you stop taking this medicine  · This medicine may make your skin more sensitive to sunlight   Wear sunscreen  Do not use sunlamps or tanning beds  · Tell any doctor or dentist who treats you that you are using this medicine  This medicine may affect certain medical test results  · Call your doctor if your symptoms do not improve or if they get worse  · Your doctor will do lab tests at regular visits to check on the effects of this medicine  Keep all appointments  · Keep all medicine out of the reach of children  Never share your medicine with anyone  Possible Side Effects While Using This Medicine:   Call your doctor right away if you notice any of these side effects:  · Allergic reaction: Itching or hives, swelling in your face or hands, swelling or tingling in your mouth or throat, chest tightness, trouble breathing  · Blistering, peeling, red skin rash  · Burning, pain, or irritation in your upper stomach or throat  · Diarrhea that may contain blood  · Fever, chills, cough, runny or stuffy nose, sore throat, body aches  · Joint pain, unusual tiredness or weakness  · Severe headache, dizziness, vision changes  · Sudden and severe stomach pain, nausea, vomiting, lightheadedness  · Swollen, painful, or tender lymph glands in the neck, armpit, or groin, or yellow skin or eyes  If you notice these less serious side effects, talk with your doctor:   · Darkening of your skin, scars, teeth, or gums  · Sores or white patches on your lips, mouth, or throat  If you notice other side effects that you think are caused by this medicine, tell your doctor  Call your doctor for medical advice about side effects  You may report side effects to FDA at 5-271-FDA-6370    © Copyright [facility] 2022 Information is for End User's use only and may not be sold, redistributed or otherwise used for commercial purposes  The above information is an  only  It is not intended as medical advice for individual conditions or treatments   Talk to your doctor, nurse or pharmacist before following any medical regimen to see if it is safe and effective for you  Follow up with PCP in 3-5 days  Proceed to  ER if symptoms worsen  Chief Complaint     Chief Complaint   Patient presents with    Cough     cough, sore throat, stuffy nose x 3 days  No fever  Had covid in January  Some nausea  Had vaccines         History of Present Illness       Patient complains of sore throat congestion for past 3 days  He also complains of purulent nasal discharge with blood streaks associated with maxillary sinus pain on the left  He has long history of sinus infections following nasal surgery  Review of Systems   Review of Systems   Constitutional: Negative for chills and fever  HENT: Positive for congestion, rhinorrhea, sinus pressure and sore throat  Negative for ear discharge and hearing loss  Respiratory: Positive for cough  Negative for chest tightness, shortness of breath and wheezing  Gastrointestinal: Negative for diarrhea and vomiting  Musculoskeletal: Negative for neck stiffness  Skin: Negative for pallor  Neurological: Negative for headaches  Current Medications       Current Outpatient Medications:     allopurinol (ZYLOPRIM) 300 mg tablet, Take 300 mg by mouth daily, Disp: , Rfl:     Cinnamon 500 MG TABS, Take 1 tablet by mouth daily, Disp: , Rfl:     Crisaborole (Eucrisa) 2 % OINT, Apply topically, Disp: , Rfl:     glipiZIDE (GLUCOTROL) 5 mg tablet, Take 5 mg by mouth daily, Disp: , Rfl:     hydrochlorothiazide (HYDRODIURIL) 25 mg tablet, Take 25 mg by mouth daily, Disp: , Rfl:     hydrocortisone 2 5 % cream, APPLY TWICE A DAY AS NEEDED FOR PSORIASIS ON FACE   USE SPARINGLY, Disp: , Rfl:     Multiple Vitamins-Minerals (MULTIVITAMIN WITH MINERALS) tablet, Take 1 tablet by mouth daily, Disp: , Rfl:     Potassium 75 MG TABS, Take 75 mg by mouth daily, Disp: , Rfl:     saccharomyces boulardii (FLORASTOR) 250 mg capsule, Take 250 mg by mouth 2 (two) times a day, Disp: , Rfl:     doxycycline (ADOXA) 100 MG tablet, Take 1 tablet (100 mg total) by mouth 2 (two) times a day for 10 days May substitute hyclate, Disp: 20 tablet, Rfl: 0    nystatin-triamcinolone (MYCOLOG-II) cream, Apply topically Three times a day, Disp: , Rfl:     valACYclovir (VALTREX) 1,000 mg tablet, Take 1 tablet (1,000 mg total) by mouth 3 (three) times a day for 7 days, Disp: 21 tablet, Rfl: 0    Current Allergies     Allergies as of 03/13/2022 - Reviewed 03/13/2022   Allergen Reaction Noted    Kiwi extract - food allergy Tongue Swelling 04/05/2018            The following portions of the patient's history were reviewed and updated as appropriate: allergies, current medications, past family history, past medical history, past social history, past surgical history and problem list      Past Medical History:   Diagnosis Date    Allergic     Diabetes mellitus ([address] )     Gout     Psoriasis     Sinus infection        Past Surgical History:   Procedure Laterality Date    DENTAL SURGERY      KNEE SURGERY Left     NOSE SURGERY      WRIST FRACTURE SURGERY         Family History   Problem Relation Age of Onset    Heart disease Mother     Diabetes Mother     Parkinsonism Father     Heart disease Father          Medications have been verified  Objective   Pulse 100   Temp 98 °F (36 7 °C)   Resp 16   Ht 6' 3" (1 905 m)   Wt 97 5 kg (215 lb)   SpO2 99%   BMI 26 87 kg/m²        Physical Exam     Physical Exam  Vitals and nursing note reviewed  Constitutional:       General: He is not in acute distress  Appearance: He is well-developed  He is not diaphoretic  HENT:      Head: Normocephalic and atraumatic  Comments: Tender over left maxillary sinus     Nose: Mucosal edema and congestion present  No rhinorrhea  Mouth/Throat:      Pharynx: Posterior oropharyngeal erythema present  Eyes:      Conjunctiva/sclera: Conjunctivae normal       Pupils: Pupils are equal, round, and reactive to light  Cardiovascular:      Rate and Rhythm: Normal rate and regular rhythm  Pulmonary:      Effort: Pulmonary effort is normal  No respiratory distress  Breath sounds: Normal breath sounds  Musculoskeletal:      Cervical back: Neck supple  Lymphadenopathy:      Cervical: No cervical adenopathy  Skin:     General: Skin is warm and dry  Coloration: Skin is not pale  Neurological:      Mental Status: He is alert and oriented to person, place, and time  Psychiatric:         Mood and Affect: Mood normal          Behavior: Behavior normal          Thought Content:  Thought content normal          Judgment: Judgment normal

## 2022-05-17 ENCOUNTER — HOSPITAL ENCOUNTER (EMERGENCY)
Facility: HOSPITAL | Age: 42
Discharge: HOME | End: 2022-05-17
Attending: EMERGENCY MEDICINE | Admitting: EMERGENCY MEDICINE
Payer: COMMERCIAL

## 2022-05-17 VITALS
HEIGHT: 75 IN | DIASTOLIC BLOOD PRESSURE: 98 MMHG | WEIGHT: 221.56 LBS | BODY MASS INDEX: 27.55 KG/M2 | SYSTOLIC BLOOD PRESSURE: 192 MMHG | RESPIRATION RATE: 16 BRPM | TEMPERATURE: 99 F | OXYGEN SATURATION: 96 % | HEART RATE: 109 BPM

## 2022-05-17 DIAGNOSIS — L03.114 CELLULITIS OF LEFT UPPER EXTREMITY: ICD-10-CM

## 2022-05-17 DIAGNOSIS — M70.22 OLECRANON BURSITIS OF LEFT ELBOW: Primary | ICD-10-CM

## 2022-05-17 PROCEDURE — 99284 EMERGENCY DEPT VISIT MOD MDM: CPT | Performed by: EMERGENCY MEDICINE

## 2022-05-17 PROCEDURE — 99283 EMERGENCY DEPT VISIT LOW MDM: CPT

## 2022-05-17 RX ORDER — CEPHALEXIN 500 MG/1
500 CAPSULE ORAL EVERY 6 HOURS SCHEDULED
Qty: 40 CAPSULE | Refills: 0 | Status: SHIPPED | OUTPATIENT
Start: 2022-05-17 | End: 2022-05-27

## 2022-05-17 RX ORDER — IBUPROFEN 600 MG/1
600 TABLET ORAL ONCE
Status: COMPLETED | OUTPATIENT
Start: 2022-05-17 | End: 2022-05-17

## 2022-05-17 RX ORDER — NAPROXEN 500 MG/1
500 TABLET ORAL 2 TIMES DAILY WITH MEALS
Qty: 30 TABLET | Refills: 0 | Status: SHIPPED | OUTPATIENT
Start: 2022-05-17

## 2022-05-17 RX ORDER — SULFAMETHOXAZOLE AND TRIMETHOPRIM 800; 160 MG/1; MG/1
1 TABLET ORAL ONCE
Status: COMPLETED | OUTPATIENT
Start: 2022-05-17 | End: 2022-05-17

## 2022-05-17 RX ORDER — SULFAMETHOXAZOLE AND TRIMETHOPRIM 800; 160 MG/1; MG/1
1 TABLET ORAL 2 TIMES DAILY
Qty: 20 TABLET | Refills: 0 | Status: SHIPPED | OUTPATIENT
Start: 2022-05-17 | End: 2022-05-27

## 2022-05-17 RX ORDER — CEPHALEXIN 250 MG/1
500 CAPSULE ORAL ONCE
Status: COMPLETED | OUTPATIENT
Start: 2022-05-17 | End: 2022-05-17

## 2022-05-17 RX ADMIN — IBUPROFEN 600 MG: 600 TABLET ORAL at 11:29

## 2022-05-17 RX ADMIN — SULFAMETHOXAZOLE AND TRIMETHOPRIM 1 TABLET: 800; 160 TABLET ORAL at 11:28

## 2022-05-17 RX ADMIN — CEPHALEXIN 500 MG: 250 CAPSULE ORAL at 11:28

## 2022-05-17 NOTE — ED PROVIDER NOTES
History  Chief Complaint   Patient presents with    Elbow Pain     Elbow swelling to left arm       Has been having intermittent swelling to the left elbow for the past 1 week  Noticed a small scratch to the elbow last week and now there is a scab that is larger  Got worse after last night when he did biceps curls  No drainage  No fevers or chills  States the swelling is slightly less than what it was yesterday  Is diabetic  History provided by:  Patient   used: No    Elbow Pain  Location:  Elbow  Elbow location:  L elbow  Pain details:     Quality:  Aching    Radiates to:  Does not radiate    Severity:  Mild    Onset quality:  Gradual    Duration:  1 week (But worsened since last night)    Timing:  Constant    Progression:  Worsening  Foreign body present:  No foreign bodies  Relieved by:  Nothing  Exacerbated by: Palpation  Ineffective treatments:  None tried  Associated symptoms: no back pain, no fever, no muscle weakness, no neck pain, no numbness and no swelling        Prior to Admission Medications   Prescriptions Last Dose Informant Patient Reported? Taking? Cinnamon 500 MG TABS  Self Yes No   Sig: Take 1 tablet by mouth daily   Crisaborole (Eucrisa) 2 % OINT   Yes No   Sig: Apply topically   Multiple Vitamins-Minerals (MULTIVITAMIN WITH MINERALS) tablet  Self Yes No   Sig: Take 1 tablet by mouth daily   Potassium 75 MG TABS   Yes No   Sig: Take 75 mg by mouth daily   allopurinol (ZYLOPRIM) 300 mg tablet  Self Yes No   Sig: Take 300 mg by mouth daily   glipiZIDE (GLUCOTROL) 5 mg tablet   Yes No   Sig: Take 5 mg by mouth daily   hydrochlorothiazide (HYDRODIURIL) 25 mg tablet   Yes No   Sig: Take 25 mg by mouth daily   hydrocortisone 2 5 % cream   Yes No   Sig: APPLY TWICE A DAY AS NEEDED FOR PSORIASIS ON FACE   USE SPARINGLY   nystatin-triamcinolone (MYCOLOG-II) cream   Yes No   Sig: Apply topically Three times a day   saccharomyces boulardii (FLORASTOR) 250 mg capsule   Yes No   Sig: Take 250 mg by mouth 2 (two) times a day   valACYclovir (VALTREX) 1,000 mg tablet   No No   Sig: Take 1 tablet (1,000 mg total) by mouth 3 (three) times a day for 7 days      Facility-Administered Medications: None       Past Medical History:   Diagnosis Date    Allergic     Diabetes mellitus ([facility] [address]     Gout     Psoriasis     Sinus infection        Past Surgical History:   Procedure Laterality Date    DENTAL SURGERY      KNEE SURGERY Left     NOSE SURGERY      WRIST FRACTURE SURGERY         Family History   Problem Relation Age of Onset    Heart disease Mother     Diabetes Mother     Parkinsonism Father     Heart disease Father      I have reviewed and agree with the history as documented  E-Cigarette/Vaping    E-Cigarette Use Never User      E-Cigarette/Vaping Substances     Social History     Tobacco Use    Smoking status: Former Smoker     Types: Cigarettes     Quit date: 2012     Years since quitting: 10 3    Smokeless tobacco: Former User     Types: Chew   Vaping Use    Vaping Use: Never used   Substance Use Topics    Alcohol use: Yes     Comment: socially     Drug use: Never       Review of Systems   Constitutional: Negative for chills and fever  HENT: Negative for ear pain, hearing loss, sore throat, trouble swallowing and voice change  Eyes: Negative for pain and discharge  Respiratory: Negative for cough, shortness of breath and wheezing  Cardiovascular: Negative for chest pain and palpitations  Gastrointestinal: Negative for abdominal pain, blood in stool, constipation, diarrhea, nausea and vomiting  Genitourinary: Negative for dysuria, flank pain, frequency and hematuria  Musculoskeletal: Positive for arthralgias  Negative for back pain, joint swelling, neck pain and neck stiffness  Skin: Negative for rash and wound  Neurological: Negative for dizziness, seizures, syncope, facial asymmetry and headaches     Psychiatric/Behavioral: Negative for hallucinations, self-injury and suicidal ideas  All other systems reviewed and are negative  Physical Exam  Physical Exam  Constitutional:       General: He is not in acute distress  Appearance: Normal appearance  He is not ill-appearing  HENT:      Head: Normocephalic and atraumatic  Right Ear: External ear normal       Left Ear: External ear normal       Nose: Nose normal       Mouth/Throat:      Mouth: Mucous membranes are moist    Eyes:      Extraocular Movements: Extraocular movements intact  Pupils: Pupils are equal, round, and reactive to light  Cardiovascular:      Rate and Rhythm: Normal rate and regular rhythm  Pulmonary:      Effort: Pulmonary effort is normal  No respiratory distress  Breath sounds: Normal breath sounds  Abdominal:      General: Abdomen is flat  Bowel sounds are normal  There is no distension  Palpations: Abdomen is soft  Tenderness: There is no abdominal tenderness  Musculoskeletal:         General: Swelling and tenderness present  Cervical back: Normal range of motion and neck supple  Comments: Left elbow with full range of motion  There is swelling with erythema and warmth to the olecranon bursa  There is minimal swelling  No discharge noted  There is a scab noted at the olecranon bursa region  Skin:     General: Skin is warm and dry  Capillary Refill: Capillary refill takes less than 2 seconds  Neurological:      General: No focal deficit present  Mental Status: He is alert and oriented to person, place, and time     Psychiatric:         Mood and Affect: Mood normal          Behavior: Behavior normal          Vital Signs  ED Triage Vitals [05/17/22 1118]   Temperature Pulse Respirations Blood Pressure SpO2   99 °F (37 2 °C) (!) 109 16 (!) 192/98 96 %      Temp Source Heart Rate Source Patient Position - Orthostatic VS BP Location FiO2 (%)   Temporal Monitor Sitting Left arm --      Pain Score       3 Vitals:    05/17/22 1118   BP: (!) 192/98   Pulse: (!) 109   Patient Position - Orthostatic VS: Sitting         Visual Acuity      ED Medications  Medications   sulfamethoxazole-trimethoprim (BACTRIM DS) 800-160 mg per tablet 1 tablet (has no administration in time range)   cephalexin (KEFLEX) capsule 500 mg (has no administration in time range)   ibuprofen (MOTRIN) tablet 600 mg (has no administration in time range)       Diagnostic Studies  Results Reviewed     None                 No orders to display              Procedures  Procedures         ED Course                               SBIRT 20yo+    Flowsheet Row Most Recent Value   SBIRT (25 yo +)    In order to provide better care to our patients, we are screening all of our patients for alcohol and drug use  Would it be okay to ask you these screening questions? Yes Filed at: 05/17/2022 1120   Initial Alcohol Screen: US AUDIT-C     1  How often do you have a drink containing alcohol? 1 Filed at: 05/17/2022 1120   2  How many drinks containing alcohol do you have on a typical day you are drinking? 0 Filed at: 05/17/2022 1120   3a  Male UNDER 65: How often do you have five or more drinks on one occasion? 0 Filed at: 05/17/2022 1120   3b  FEMALE Any Age, or MALE 65+: How often do you have 4 or more drinks on one occassion? 0 Filed at: 05/17/2022 1120   Audit-C Score 1 Filed at: 05/17/2022 1120   ZANDER: How many times in the past year have you    Used an illegal drug or used a prescription medication for non-medical reasons?  Never Filed at: 05/17/2022 1120                    MDM    Disposition  Final diagnoses:   Olecranon bursitis of left elbow   Cellulitis of left upper extremity     Time reflects when diagnosis was documented in both MDM as applicable and the Disposition within this note     Time User Action Codes Description Comment    5/17/2022 11:21 AM Gonzalo Holden Add [M70 22] Olecranon bursitis of left elbow     5/17/2022 11:22 AM Navin Paola Bhatia Add [P64 729] Cellulitis of left upper extremity       ED Disposition     ED Disposition   Discharge    Condition   Stable    Date/Time   Tue May 17, 2022 11:24 AM    Comment   Humberto Maharaj discharge to home/self care  Follow-up Information     Follow up With Specialties Details Why Contact Info    Mi Simpson MD Internal Medicine Call in 2 days  [address]  [facility] [address] [place] [address]  [facility] Orthopedic Surgery Call in 2 days  [address] [address]  [phone]            Patient's Medications   Discharge Prescriptions    CEPHALEXIN (KEFLEX) 500 MG CAPSULE    Take 1 capsule (500 mg total) by mouth every 6 (six) hours for 10 days       Start Date: 5/17/2022 End Date: 5/27/2022       Order Dose: 500 mg       Quantity: 40 capsule    Refills: 0    NAPROXEN (NAPROSYN) 500 MG TABLET    Take 1 tablet (500 mg total) by mouth in the morning and 1 tablet (500 mg total) in the evening  Take with meals  Start Date: 5/17/2022 End Date: --       Order Dose: 500 mg       Quantity: 30 tablet    Refills: 0    SULFAMETHOXAZOLE-TRIMETHOPRIM (BACTRIM DS) 800-160 MG PER TABLET    Take 1 tablet by mouth in the morning and 1 tablet in the evening  Do all this for 10 days  smx-tmp DS (BACTRIM) 800-160 mg tabs (1tab q12 D10)  Start Date: 5/17/2022 End Date: 5/27/2022       Order Dose: 1 tablet       Quantity: 20 tablet    Refills: 0       No discharge procedures on file      PDMP Review     None          ED Provider  Electronically Signed by           Carla Lopez MD  05/17/22 9236

## 2022-05-26 ENCOUNTER — OFFICE VISIT (OUTPATIENT)
Dept: URGENT CARE | Facility: CLINIC | Age: 42
End: 2022-05-26
Payer: COMMERCIAL

## 2022-05-26 VITALS
SYSTOLIC BLOOD PRESSURE: 143 MMHG | HEIGHT: 75 IN | TEMPERATURE: 100.9 F | OXYGEN SATURATION: 99 % | BODY MASS INDEX: 27.48 KG/M2 | HEART RATE: 102 BPM | RESPIRATION RATE: 16 BRPM | DIASTOLIC BLOOD PRESSURE: 100 MMHG | WEIGHT: 221 LBS

## 2022-05-26 DIAGNOSIS — B34.9 VIRAL SYNDROME: Primary | ICD-10-CM

## 2022-05-26 PROCEDURE — 87636 SARSCOV2 & INF A&B AMP PRB: CPT

## 2022-05-26 PROCEDURE — 99213 OFFICE O/P EST LOW 20 MIN: CPT

## 2022-05-26 NOTE — PROGRESS NOTES
[facility]        NAME: Roz Case is a 35 y o  male  : 1980    MRN: [id number]  DATE: May 26, 2022  TIME: 3:25 PM    Assessment and Plan   Viral syndrome [B34 9]  1  Viral syndrome  Covid/Flu-Office Collect         Patient Instructions     You have been swabbed for COVID/influenza today  You can expect your results in 24-48 hours  Results will be available on [facility]  Please isolate from others until your receive your results  If your results are positive for COVID regardless of vaccination status, you are to isolate for 5 days from symptom onset then continue to wear a mask around others for another 5 days  If you have a fever, continue to stay home until your fever resolves  If you were exposed to someone with COVID and have been boosted or completed the primary series of Pfizer or Moderna vaccine within the last 6 months, or completed the primary series of J&J vaccine within the last 2 months - you are to wear a mask around others for 10 days and test on day 5 if possible  If you completed the primary series of Pfizer or Moderna vaccine over 6 months ago and are not boosted or completed the primary series of J&J over 2 months ago and are not boosted or are not vaccinated - you are to stay home for 5 days then continue to wear a mask around others for another 5 days  If you are unable to quarantine, you must wear a mask for 10 days  You should be fever free for 24 hours without the use of medication before returning to work  Tylenol and ibuprofen as needed for pain and/or fever  Mucinex DM for cough and congestion  Vitamin C 1000 mg, Vitamin D3 2000 units, and Zinc 100 mg help to boost your immunity to fight viruses  Hydrate - water and sports drinks (such as Gatorade, body armour, etc ) are great for hydration  Rest   Follow up with your PCP  Go to ED for worsening symptoms      Chief Complaint     Chief Complaint   Patient presents with    Cough     Cough, body aches with pain in neck and shoulders and fever of 102  Covid test negative         History of Present Illness       Patient complaining of body aches, fever, cough, congestion, headache, and runny nose that started 4 days ago  Patient reports taking ibuprofen for pain and fever  Patient reports significant PMH of diabetes, hypertension, psoriasis, and gout  Review of Systems   Review of Systems   Constitutional: Positive for appetite change (Decreased appetite), chills, fatigue and fever  HENT: Positive for congestion and rhinorrhea  Negative for ear pain and sore throat  Respiratory: Positive for cough  Negative for shortness of breath  Cardiovascular: Negative for chest pain  Gastrointestinal: Negative for diarrhea, nausea and vomiting  Musculoskeletal: Positive for myalgias  Neurological: Positive for headaches  All other systems reviewed and are negative  Current Medications       Current Outpatient Medications:     allopurinol (ZYLOPRIM) 300 mg tablet, Take 300 mg by mouth daily, Disp: , Rfl:     cephalexin (KEFLEX) 500 mg capsule, Take 1 capsule (500 mg total) by mouth every 6 (six) hours for 10 days, Disp: 40 capsule, Rfl: 0    Cinnamon 500 MG TABS, Take 1 tablet by mouth daily, Disp: , Rfl:     Crisaborole (Eucrisa) 2 % OINT, Apply topically, Disp: , Rfl:     glipiZIDE (GLUCOTROL) 5 mg tablet, Take 5 mg by mouth daily, Disp: , Rfl:     hydrochlorothiazide (HYDRODIURIL) 25 mg tablet, Take 25 mg by mouth daily, Disp: , Rfl:     hydrocortisone 2 5 % cream, APPLY TWICE A DAY AS NEEDED FOR PSORIASIS ON FACE   USE SPARINGLY, Disp: , Rfl:     Multiple Vitamins-Minerals (MULTIVITAMIN WITH MINERALS) tablet, Take 1 tablet by mouth daily, Disp: , Rfl:     Potassium 75 MG TABS, Take 75 mg by mouth daily, Disp: , Rfl:     saccharomyces boulardii (FLORASTOR) 250 mg capsule, Take 250 mg by mouth 2 (two) times a day, Disp: , Rfl:     sulfamethoxazole-trimethoprim (BACTRIM DS) 800-160 mg per tablet, Take 1 tablet by mouth in the morning and 1 tablet in the evening  Do all this for 10 days  smx-tmp DS (BACTRIM) 800-160 mg tabs (1tab q12 D10)  , Disp: 20 tablet, Rfl: 0    naproxen (Naprosyn) 500 mg tablet, Take 1 tablet (500 mg total) by mouth in the morning and 1 tablet (500 mg total) in the evening  Take with meals  (Patient not taking: Reported on 5/26/2022), Disp: 30 tablet, Rfl: 0    nystatin-triamcinolone (MYCOLOG-II) cream, Apply topically Three times a day, Disp: , Rfl:     valACYclovir (VALTREX) 1,000 mg tablet, Take 1 tablet (1,000 mg total) by mouth 3 (three) times a day for 7 days, Disp: 21 tablet, Rfl: 0    Current Allergies     Allergies as of 05/26/2022 - Reviewed 05/26/2022   Allergen Reaction Noted    Kiwi extract - food allergy Tongue Swelling 04/05/2018            The following portions of the patient's history were reviewed and updated as appropriate: allergies, current medications, past family history, past medical history, past social history, past surgical history and problem list      Past Medical History:   Diagnosis Date    Allergic     Diabetes mellitus ([address] )     Gout     Hypertension     Psoriasis     Sinus infection        Past Surgical History:   Procedure Laterality Date    DENTAL SURGERY      KNEE SURGERY Left     NOSE SURGERY      WRIST FRACTURE SURGERY         Family History   Problem Relation Age of Onset    Heart disease Mother     Diabetes Mother     Parkinsonism Father     Heart disease Father          Medications have been verified  Objective   /100   Pulse 102   Temp (!) 100 9 °F (38 3 °C)   Resp 16   Ht 6' 3" (1 905 m)   Wt 100 kg (221 lb)   SpO2 99%   BMI 27 62 kg/m²        Physical Exam     Physical Exam  Vitals and nursing note reviewed  Constitutional:       General: He is not in acute distress  Appearance: Normal appearance  He is not toxic-appearing  HENT:      Head: Normocephalic and atraumatic        Right Ear: Tympanic membrane, ear canal and external ear normal       Left Ear: Tympanic membrane, ear canal and external ear normal       Nose: Congestion and rhinorrhea present  Rhinorrhea is clear  Right Turbinates: Swollen  Left Turbinates: Swollen  Right Sinus: No maxillary sinus tenderness or frontal sinus tenderness  Left Sinus: No maxillary sinus tenderness or frontal sinus tenderness  Mouth/Throat:      Mouth: Mucous membranes are moist       Pharynx: Oropharynx is clear  Posterior oropharyngeal erythema present  No oropharyngeal exudate  Eyes:      General: No scleral icterus  Right eye: No discharge  Left eye: No discharge  Conjunctiva/sclera: Conjunctivae normal    Cardiovascular:      Rate and Rhythm: Normal rate and regular rhythm  Heart sounds: Normal heart sounds  Pulmonary:      Effort: Pulmonary effort is normal       Breath sounds: Normal breath sounds  Musculoskeletal:         General: Normal range of motion  Cervical back: Normal range of motion  Skin:     General: Skin is warm and dry  Neurological:      General: No focal deficit present  Mental Status: He is alert and oriented to person, place, and time     Psychiatric:         Mood and Affect: Mood normal          Behavior: Behavior normal

## 2022-05-26 NOTE — LETTER
May 26, 2022     Patient: Christofer Esteban   YOB: 1980   Date of Visit: 5/26/2022       To Whom it May Concern:    Christofer Esteban was seen in my clinic on 5/26/2022  He may return to work when he is fever free for 24 hours without the use of medication  If you have any questions or concerns, please don't hesitate to call           Sincerely,          ADELITA Maciel        CC: No Recipients

## 2022-05-26 NOTE — PATIENT INSTRUCTIONS
You have been swabbed for COVID/influenza today  You can expect your results in 24-48 hours  Results will be available on [employer]  Please isolate from others until your receive your results  If your results are positive for COVID regardless of vaccination status, you are to isolate for 5 days from symptom onset then continue to wear a mask around others for another 5 days  If you have a fever, continue to stay home until your fever resolves  If you were exposed to someone with COVID and have been boosted or completed the primary series of Pfizer or Moderna vaccine within the last 6 months, or completed the primary series of J&J vaccine within the last 2 months - you are to wear a mask around others for 10 days and test on day 5 if possible  If you completed the primary series of Pfizer or Moderna vaccine over 6 months ago and are not boosted or completed the primary series of J&J over 2 months ago and are not boosted or are not vaccinated - you are to stay home for 5 days then continue to wear a mask around others for another 5 days  If you are unable to quarantine, you must wear a mask for 10 days  You should be fever free for 24 hours without the use of medication before returning to work  Tylenol and ibuprofen as needed for pain and/or fever  Mucinex DM for cough and congestion  Vitamin C 1000 mg, Vitamin D3 2000 units, and Zinc 100 mg help to boost your immunity to fight viruses  Hydrate - water and sports drinks (such as Gatorade, body armour, etc ) are great for hydration  Rest   Follow up with your PCP  Go to ED for worsening symptoms

## 2022-05-27 LAB
FLUAV RNA RESP QL NAA+PROBE: NEGATIVE
FLUBV RNA RESP QL NAA+PROBE: NEGATIVE
SARS-COV-2 RNA RESP QL NAA+PROBE: NEGATIVE

## 2022-06-08 ENCOUNTER — OFFICE VISIT (OUTPATIENT)
Dept: OBGYN CLINIC | Facility: CLINIC | Age: 42
End: 2022-06-08
Payer: COMMERCIAL

## 2022-06-08 VITALS
HEIGHT: 75 IN | DIASTOLIC BLOOD PRESSURE: 92 MMHG | TEMPERATURE: 98.2 F | WEIGHT: 214.2 LBS | SYSTOLIC BLOOD PRESSURE: 150 MMHG | HEART RATE: 90 BPM | BODY MASS INDEX: 26.63 KG/M2

## 2022-06-08 DIAGNOSIS — M70.22 OLECRANON BURSITIS OF LEFT ELBOW: ICD-10-CM

## 2022-06-08 DIAGNOSIS — M25.522 LEFT ELBOW PAIN: Primary | ICD-10-CM

## 2022-06-08 PROCEDURE — 99203 OFFICE O/P NEW LOW 30 MIN: CPT | Performed by: ORTHOPAEDIC SURGERY

## 2022-06-08 NOTE — PROGRESS NOTES
ASSESSMENT/PLAN:    Diagnoses and all orders for this visit:    Left elbow pain    Olecranon bursitis of left elbow        Plan:  I would recommend follow-up as needed  He may resume normal activities including his weightlifting  He was reassured that I saw no cause for concern at this time  However, if swelling recurs, he was encouraged to contact the office so he can be evaluated when he is symptomatic  Return if symptoms worsen or fail to improve       _____________________________________________________  CHIEF COMPLAINT:  Chief Complaint   Patient presents with    Left Elbow - Pain         SUBJECTIVE:  Beryl Nick is a 35y o  year old male who presents for evaluation of his left elbow  He has had intermittent swelling over the olecranon which seems to occur with activity, such as weightlifting  About 3 weeks ago, he was doing some weightlifting noted swelling in the elbow and then subsequently developed warmth and redness  He was seen in the emergency room, placed on antibiotics and recommended to seek orthopedic follow-up  He has had resolution of the redness  He believes there may still be slight swelling  He notes some pain if he rests his elbow on a hard object  He denies paresthesias  He denies any systemic symptoms at this time but does recall fever and feeling ill about a week after onset of the antibiotics which was attributed to a flu by his primary care physician        PAST MEDICAL HISTORY:  Past Medical History:   Diagnosis Date    Allergic     Diabetes mellitus ([address] )     Gout     Hypertension     Psoriasis     Sinus infection        PAST SURGICAL HISTORY:  Past Surgical History:   Procedure Laterality Date    DENTAL SURGERY      KNEE SURGERY Left     NOSE SURGERY      WRIST FRACTURE SURGERY         FAMILY HISTORY:  Family History   Problem Relation Age of Onset    Heart disease Mother     Diabetes Mother     Parkinsonism Father     Heart disease Father        SOCIAL HISTORY:  Social History     Tobacco Use    Smoking status: Former Smoker     Types: Cigarettes     Quit date: 2012     Years since quitting: 10 4    Smokeless tobacco: Former User     Types: Chew   Vaping Use    Vaping Use: Never used   Substance Use Topics    Alcohol use: Yes     Comment: socially     Drug use: Never       MEDICATIONS:    Current Outpatient Medications:     allopurinol (ZYLOPRIM) 300 mg tablet, Take 300 mg by mouth daily, Disp: , Rfl:     Cinnamon 500 MG TABS, Take 1 tablet by mouth daily, Disp: , Rfl:     glipiZIDE (GLUCOTROL) 5 mg tablet, Take 5 mg by mouth daily, Disp: , Rfl:     hydrochlorothiazide (HYDRODIURIL) 25 mg tablet, Take 25 mg by mouth daily, Disp: , Rfl:     hydrocortisone 2 5 % cream, APPLY TWICE A DAY AS NEEDED FOR PSORIASIS ON FACE  USE SPARINGLY, Disp: , Rfl:     Multiple Vitamins-Minerals (MULTIVITAMIN WITH MINERALS) tablet, Take 1 tablet by mouth daily, Disp: , Rfl:     Potassium 75 MG TABS, Take 75 mg by mouth daily, Disp: , Rfl:     saccharomyces boulardii (FLORASTOR) 250 mg capsule, Take 250 mg by mouth 2 (two) times a day, Disp: , Rfl:     Crisaborole (Eucrisa) 2 % OINT, Apply topically (Patient not taking: Reported on 6/8/2022), Disp: , Rfl:     naproxen (Naprosyn) 500 mg tablet, Take 1 tablet (500 mg total) by mouth in the morning and 1 tablet (500 mg total) in the evening  Take with meals  (Patient not taking: No sig reported), Disp: 30 tablet, Rfl: 0    nystatin-triamcinolone (MYCOLOG-II) cream, Apply topically Three times a day, Disp: , Rfl:     valACYclovir (VALTREX) 1,000 mg tablet, Take 1 tablet (1,000 mg total) by mouth 3 (three) times a day for 7 days, Disp: 21 tablet, Rfl: 0    ALLERGIES:  Allergies   Allergen Reactions    Kiwi Extract - Food Allergy Tongue Swelling       Review of systems:   Constitutional: Negative for fatigue, fever or loss of apetite  HENT: Negative  Respiratory: Negative for shortness of breath, dyspnea  Cardiovascular: Negative for chest pain/tightness  Gastrointestinal: Negative for abdominal pain, N/V  Endocrine: Negative for cold/heat intolerance, unexplained weight loss/gain  Genitourinary: Negative for flank pain, dysuria, hematuria  Musculoskeletal:  Positive as in the HPI   Skin: Negative for rash  Neurological:  Negative  Psychiatric/Behavioral: Negative for agitation  _____________________________________________________  PHYSICAL EXAMINATION:    Blood pressure 150/92, pulse 90, temperature 98 2 °F (36 8 °C), temperature source Temporal, height 6' 3" (1 905 m), weight 97 2 kg (214 lb 3 2 oz)  General: well developed and well nourished, alert, oriented times 3 and appears comfortable  Psychiatric: Normal  HEENT: Benign  Cardiovascular: Regular    Pulmonary: No wheezing or stridor  Abdomen: Soft, Nontender  Skin: No masses, erythema, lacerations, fluctation, ulcerations  Neurovascular: Motor and sensory exams are intact and pulses are palpable  MUSCULOSKELETAL EXAMINATION:    The left elbow exam demonstrates full, painless range of motion  There is no palpable tenderness at the olecranon and no fluid or swelling within the olecranon bursa  He has negative Tinel's over the ulnar nerve  He has full forearm rotation without complaint  Excellent strength is noted during resisted range of motion  _____________________________________________________  STUDIES REVIEWED:  The ER note was reviewed dated 05/17/2022            Miriam Shen

## 2023-03-26 ENCOUNTER — HOSPITAL ENCOUNTER (OUTPATIENT)
Dept: ULTRASOUND IMAGING | Facility: HOSPITAL | Age: 43
Discharge: HOME | End: 2023-03-26

## 2023-03-26 DIAGNOSIS — R74.9 NONSPECIFIC ABNORMAL SERUM ENZYME LEVELS: ICD-10-CM

## 2023-03-26 DIAGNOSIS — R94.5 NONSPECIFIC ABNORMAL RESULTS OF LIVER FUNCTION STUDY: ICD-10-CM

## 2023-09-18 ENCOUNTER — HOSPITAL ENCOUNTER (INPATIENT)
Facility: HOSPITAL | Age: 43
LOS: 1 days | DRG: 871 | End: 2023-09-19
Attending: EMERGENCY MEDICINE | Admitting: HOSPITALIST
Payer: COMMERCIAL

## 2023-09-18 ENCOUNTER — APPOINTMENT (EMERGENCY)
Dept: ULTRASOUND IMAGING | Facility: HOSPITAL | Age: 43
DRG: 871 | End: 2023-09-18
Payer: COMMERCIAL

## 2023-09-18 ENCOUNTER — APPOINTMENT (EMERGENCY)
Dept: MRI IMAGING | Facility: HOSPITAL | Age: 43
DRG: 871 | End: 2023-09-18
Payer: COMMERCIAL

## 2023-09-18 ENCOUNTER — APPOINTMENT (INPATIENT)
Dept: CT IMAGING | Facility: HOSPITAL | Age: 43
DRG: 871 | End: 2023-09-18
Payer: COMMERCIAL

## 2023-09-18 DIAGNOSIS — Q44.1 GALLBLADDER ANOMALY: ICD-10-CM

## 2023-09-18 DIAGNOSIS — R11.2 NAUSEA VOMITING AND DIARRHEA: ICD-10-CM

## 2023-09-18 DIAGNOSIS — R11.10 VOMITING: Primary | ICD-10-CM

## 2023-09-18 DIAGNOSIS — R17 ELEVATED BILIRUBIN: ICD-10-CM

## 2023-09-18 DIAGNOSIS — R19.7 NAUSEA VOMITING AND DIARRHEA: ICD-10-CM

## 2023-09-18 PROBLEM — G93.40 ACUTE ENCEPHALOPATHY: Status: ACTIVE | Noted: 2023-09-18

## 2023-09-18 LAB
ALBUMIN SERPL BCP-MCNC: 3.8 G/DL (ref 3.5–5)
ALBUMIN SERPL BCP-MCNC: 4.1 G/DL (ref 3.5–5)
ALP SERPL-CCNC: 112 U/L (ref 34–104)
ALP SERPL-CCNC: 153 U/L (ref 34–104)
ALT SERPL W P-5'-P-CCNC: 28 U/L (ref 7–52)
ALT SERPL W P-5'-P-CCNC: 31 U/L (ref 7–52)
AMPHETAMINES SERPL QL SCN: NEGATIVE
ANION GAP SERPL CALCULATED.3IONS-SCNC: 11 MMOL/L
ANION GAP SERPL CALCULATED.3IONS-SCNC: 8 MMOL/L
AST SERPL W P-5'-P-CCNC: 52 U/L (ref 13–39)
AST SERPL W P-5'-P-CCNC: 65 U/L (ref 13–39)
BACTERIA UR QL AUTO: NORMAL /HPF
BARBITURATES UR QL: NEGATIVE
BASE EX.OXY STD BLDV CALC-SCNC: 65.9 % (ref 60–80)
BASE EXCESS BLDV CALC-SCNC: 0.9 MMOL/L
BASOPHILS # BLD AUTO: 0.05 THOUSANDS/ÂΜL (ref 0–0.1)
BASOPHILS # BLD AUTO: 0.07 THOUSANDS/ÂΜL (ref 0–0.1)
BASOPHILS NFR BLD AUTO: 0 % (ref 0–1)
BASOPHILS NFR BLD AUTO: 0 % (ref 0–1)
BENZODIAZ UR QL: POSITIVE
BETA-HYDROXYBUTYRATE: 0.4 MMOL/L
BILIRUB DIRECT SERPL-MCNC: 1.26 MG/DL (ref 0–0.2)
BILIRUB SERPL-MCNC: 2.18 MG/DL (ref 0.2–1)
BILIRUB SERPL-MCNC: 2.82 MG/DL (ref 0.2–1)
BILIRUB UR QL STRIP: ABNORMAL
BUN SERPL-MCNC: 10 MG/DL (ref 5–25)
BUN SERPL-MCNC: 14 MG/DL (ref 5–25)
CALCIUM SERPL-MCNC: 8.9 MG/DL (ref 8.4–10.2)
CALCIUM SERPL-MCNC: 9.6 MG/DL (ref 8.4–10.2)
CARDIAC TROPONIN I PNL SERPL HS: 66 NG/L
CHLORIDE SERPL-SCNC: 100 MMOL/L (ref 96–108)
CHLORIDE SERPL-SCNC: 94 MMOL/L (ref 96–108)
CLARITY UR: CLEAR
CO2 SERPL-SCNC: 25 MMOL/L (ref 21–32)
CO2 SERPL-SCNC: 26 MMOL/L (ref 21–32)
COCAINE UR QL: NEGATIVE
COLOR UR: ABNORMAL
CREAT SERPL-MCNC: 0.76 MG/DL (ref 0.6–1.3)
CREAT SERPL-MCNC: 0.85 MG/DL (ref 0.6–1.3)
CREAT UR-MCNC: 129.5 MG/DL
EOSINOPHIL # BLD AUTO: 0.21 THOUSAND/ÂΜL (ref 0–0.61)
EOSINOPHIL # BLD AUTO: 0.25 THOUSAND/ÂΜL (ref 0–0.61)
EOSINOPHIL NFR BLD AUTO: 2 % (ref 0–6)
EOSINOPHIL NFR BLD AUTO: 2 % (ref 0–6)
ERYTHROCYTE [DISTWIDTH] IN BLOOD BY AUTOMATED COUNT: 12.9 % (ref 11.6–15.1)
ERYTHROCYTE [DISTWIDTH] IN BLOOD BY AUTOMATED COUNT: 13.1 % (ref 11.6–15.1)
FLUAV RNA RESP QL NAA+PROBE: NEGATIVE
FLUBV RNA RESP QL NAA+PROBE: NEGATIVE
GFR SERPL CREATININE-BSD FRML MDRD: 105 ML/MIN/1.73SQ M
GFR SERPL CREATININE-BSD FRML MDRD: 110 ML/MIN/1.73SQ M
GLUCOSE SERPL-MCNC: 189 MG/DL (ref 65–140)
GLUCOSE SERPL-MCNC: 226 MG/DL (ref 65–140)
GLUCOSE SERPL-MCNC: 254 MG/DL (ref 65–140)
GLUCOSE SERPL-MCNC: 283 MG/DL (ref 65–140)
GLUCOSE SERPL-MCNC: 314 MG/DL (ref 65–140)
GLUCOSE UR STRIP-MCNC: ABNORMAL MG/DL
HCO3 BLDV-SCNC: 25.4 MMOL/L (ref 24–30)
HCT VFR BLD AUTO: 38.6 % (ref 36.5–49.3)
HCT VFR BLD AUTO: 42.7 % (ref 36.5–49.3)
HGB BLD-MCNC: 13.2 G/DL (ref 12–17)
HGB BLD-MCNC: 14.8 G/DL (ref 12–17)
HGB UR QL STRIP.AUTO: NEGATIVE
IMM GRANULOCYTES # BLD AUTO: 0.07 THOUSAND/UL (ref 0–0.2)
IMM GRANULOCYTES # BLD AUTO: 0.1 THOUSAND/UL (ref 0–0.2)
IMM GRANULOCYTES NFR BLD AUTO: 1 % (ref 0–2)
IMM GRANULOCYTES NFR BLD AUTO: 1 % (ref 0–2)
KETONES UR STRIP-MCNC: ABNORMAL MG/DL
LACTATE SERPL-SCNC: 1.2 MMOL/L (ref 0.5–2)
LACTATE SERPL-SCNC: 1.2 MMOL/L (ref 0.5–2)
LEUKOCYTE ESTERASE UR QL STRIP: NEGATIVE
LIPASE SERPL-CCNC: 52 U/L (ref 11–82)
LYMPHOCYTES # BLD AUTO: 0.89 THOUSANDS/ÂΜL (ref 0.6–4.47)
LYMPHOCYTES # BLD AUTO: 1.1 THOUSANDS/ÂΜL (ref 0.6–4.47)
LYMPHOCYTES NFR BLD AUTO: 5 % (ref 14–44)
LYMPHOCYTES NFR BLD AUTO: 8 % (ref 14–44)
MAGNESIUM SERPL-MCNC: 1.4 MG/DL (ref 1.9–2.7)
MCH RBC QN AUTO: 33.8 PG (ref 26.8–34.3)
MCH RBC QN AUTO: 34.3 PG (ref 26.8–34.3)
MCHC RBC AUTO-ENTMCNC: 34.2 G/DL (ref 31.4–37.4)
MCHC RBC AUTO-ENTMCNC: 34.7 G/DL (ref 31.4–37.4)
MCV RBC AUTO: 99 FL (ref 82–98)
MCV RBC AUTO: 99 FL (ref 82–98)
METHADONE UR QL: NEGATIVE
MONOCYTES # BLD AUTO: 1.19 THOUSAND/ÂΜL (ref 0.17–1.22)
MONOCYTES # BLD AUTO: 1.24 THOUSAND/ÂΜL (ref 0.17–1.22)
MONOCYTES NFR BLD AUTO: 8 % (ref 4–12)
MONOCYTES NFR BLD AUTO: 8 % (ref 4–12)
NEUTROPHILS # BLD AUTO: 11.56 THOUSANDS/ÂΜL (ref 1.85–7.62)
NEUTROPHILS # BLD AUTO: 13.92 THOUSANDS/ÂΜL (ref 1.85–7.62)
NEUTS SEG NFR BLD AUTO: 81 % (ref 43–75)
NEUTS SEG NFR BLD AUTO: 84 % (ref 43–75)
NITRITE UR QL STRIP: NEGATIVE
NON-SQ EPI CELLS URNS QL MICRO: NORMAL /HPF
NRBC BLD AUTO-RTO: 0 /100 WBCS
NRBC BLD AUTO-RTO: 0 /100 WBCS
O2 CT BLDV-SCNC: 14.5 ML/DL
OPIATES UR QL SCN: NEGATIVE
OXYCODONE+OXYMORPHONE UR QL SCN: NEGATIVE
PCO2 BLDV: 39.9 MM HG (ref 42–50)
PCP UR QL: NEGATIVE
PH BLDV: 7.42 [PH] (ref 7.3–7.4)
PH UR STRIP.AUTO: 6 [PH]
PHOSPHATE SERPL-MCNC: 2.2 MG/DL (ref 2.7–4.5)
PLATELET # BLD AUTO: 79 THOUSANDS/UL (ref 149–390)
PLATELET # BLD AUTO: 90 THOUSANDS/UL (ref 149–390)
PMV BLD AUTO: 11 FL (ref 8.9–12.7)
PMV BLD AUTO: 11.1 FL (ref 8.9–12.7)
PO2 BLDV: 35.9 MM HG (ref 35–45)
POTASSIUM SERPL-SCNC: 4 MMOL/L (ref 3.5–5.3)
POTASSIUM SERPL-SCNC: 4.1 MMOL/L (ref 3.5–5.3)
PROCALCITONIN SERPL-MCNC: 0.19 NG/ML
PROT SERPL-MCNC: 8 G/DL (ref 6.4–8.4)
PROT SERPL-MCNC: 8.4 G/DL (ref 6.4–8.4)
PROT UR STRIP-MCNC: ABNORMAL MG/DL
RBC # BLD AUTO: 3.9 MILLION/UL (ref 3.88–5.62)
RBC # BLD AUTO: 4.31 MILLION/UL (ref 3.88–5.62)
RBC #/AREA URNS AUTO: NORMAL /HPF
RSV RNA RESP QL NAA+PROBE: NEGATIVE
SARS-COV-2 RNA RESP QL NAA+PROBE: NEGATIVE
SODIUM 24H UR-SCNC: 31 MOL/L
SODIUM SERPL-SCNC: 130 MMOL/L (ref 135–147)
SODIUM SERPL-SCNC: 134 MMOL/L (ref 135–147)
SP GR UR STRIP.AUTO: 1.01 (ref 1–1.03)
THC UR QL: NEGATIVE
TSH SERPL DL<=0.05 MIU/L-ACNC: 3.33 UIU/ML (ref 0.45–4.5)
UROBILINOGEN UR QL STRIP.AUTO: 4 E.U./DL
WBC # BLD AUTO: 14.18 THOUSAND/UL (ref 4.31–10.16)
WBC # BLD AUTO: 16.47 THOUSAND/UL (ref 4.31–10.16)
WBC #/AREA URNS AUTO: NORMAL /HPF

## 2023-09-18 PROCEDURE — G1004 CDSM NDSC: HCPCS

## 2023-09-18 PROCEDURE — 87040 BLOOD CULTURE FOR BACTERIA: CPT

## 2023-09-18 PROCEDURE — 70450 CT HEAD/BRAIN W/O DYE: CPT

## 2023-09-18 PROCEDURE — 0241U HB NFCT DS VIR RESP RNA 4 TRGT: CPT | Performed by: EMERGENCY MEDICINE

## 2023-09-18 PROCEDURE — 84146 ASSAY OF PROLACTIN: CPT | Performed by: PHYSICIAN ASSISTANT

## 2023-09-18 PROCEDURE — 86618 LYME DISEASE ANTIBODY: CPT | Performed by: PHYSICIAN ASSISTANT

## 2023-09-18 PROCEDURE — 81001 URINALYSIS AUTO W/SCOPE: CPT | Performed by: EMERGENCY MEDICINE

## 2023-09-18 PROCEDURE — 83690 ASSAY OF LIPASE: CPT | Performed by: EMERGENCY MEDICINE

## 2023-09-18 PROCEDURE — 99223 1ST HOSP IP/OBS HIGH 75: CPT | Performed by: HOSPITALIST

## 2023-09-18 PROCEDURE — 36415 COLL VENOUS BLD VENIPUNCTURE: CPT | Performed by: EMERGENCY MEDICINE

## 2023-09-18 PROCEDURE — 83735 ASSAY OF MAGNESIUM: CPT | Performed by: PHYSICIAN ASSISTANT

## 2023-09-18 PROCEDURE — 83930 ASSAY OF BLOOD OSMOLALITY: CPT

## 2023-09-18 PROCEDURE — 93005 ELECTROCARDIOGRAM TRACING: CPT

## 2023-09-18 PROCEDURE — 82948 REAGENT STRIP/BLOOD GLUCOSE: CPT

## 2023-09-18 PROCEDURE — 96361 HYDRATE IV INFUSION ADD-ON: CPT

## 2023-09-18 PROCEDURE — 84300 ASSAY OF URINE SODIUM: CPT

## 2023-09-18 PROCEDURE — 99285 EMERGENCY DEPT VISIT HI MDM: CPT

## 2023-09-18 PROCEDURE — 76705 ECHO EXAM OF ABDOMEN: CPT

## 2023-09-18 PROCEDURE — NC001 PR NO CHARGE: Performed by: PHYSICIAN ASSISTANT

## 2023-09-18 PROCEDURE — 84443 ASSAY THYROID STIM HORMONE: CPT

## 2023-09-18 PROCEDURE — 83036 HEMOGLOBIN GLYCOSYLATED A1C: CPT

## 2023-09-18 PROCEDURE — 85025 COMPLETE CBC W/AUTO DIFF WBC: CPT | Performed by: EMERGENCY MEDICINE

## 2023-09-18 PROCEDURE — 82607 VITAMIN B-12: CPT

## 2023-09-18 PROCEDURE — 80307 DRUG TEST PRSMV CHEM ANLYZR: CPT | Performed by: PHYSICIAN ASSISTANT

## 2023-09-18 PROCEDURE — 80053 COMPREHEN METABOLIC PANEL: CPT | Performed by: PHYSICIAN ASSISTANT

## 2023-09-18 PROCEDURE — 99291 CRITICAL CARE FIRST HOUR: CPT | Performed by: PHYSICIAN ASSISTANT

## 2023-09-18 PROCEDURE — 86753 PROTOZOA ANTIBODY NOS: CPT | Performed by: PHYSICIAN ASSISTANT

## 2023-09-18 PROCEDURE — 82805 BLOOD GASES W/O2 SATURATION: CPT | Performed by: EMERGENCY MEDICINE

## 2023-09-18 PROCEDURE — 99285 EMERGENCY DEPT VISIT HI MDM: CPT | Performed by: EMERGENCY MEDICINE

## 2023-09-18 PROCEDURE — 82746 ASSAY OF FOLIC ACID SERUM: CPT

## 2023-09-18 PROCEDURE — 84145 PROCALCITONIN (PCT): CPT

## 2023-09-18 PROCEDURE — 80053 COMPREHEN METABOLIC PANEL: CPT | Performed by: EMERGENCY MEDICINE

## 2023-09-18 PROCEDURE — 84484 ASSAY OF TROPONIN QUANT: CPT | Performed by: PHYSICIAN ASSISTANT

## 2023-09-18 PROCEDURE — 82010 KETONE BODYS QUAN: CPT | Performed by: EMERGENCY MEDICINE

## 2023-09-18 PROCEDURE — 86788 WEST NILE VIRUS AB IGM: CPT | Performed by: PHYSICIAN ASSISTANT

## 2023-09-18 PROCEDURE — 82570 ASSAY OF URINE CREATININE: CPT

## 2023-09-18 PROCEDURE — 96374 THER/PROPH/DIAG INJ IV PUSH: CPT

## 2023-09-18 PROCEDURE — 74181 MRI ABDOMEN W/O CONTRAST: CPT

## 2023-09-18 PROCEDURE — 86789 WEST NILE VIRUS ANTIBODY: CPT | Performed by: PHYSICIAN ASSISTANT

## 2023-09-18 PROCEDURE — 84100 ASSAY OF PHOSPHORUS: CPT | Performed by: PHYSICIAN ASSISTANT

## 2023-09-18 PROCEDURE — 83605 ASSAY OF LACTIC ACID: CPT | Performed by: PHYSICIAN ASSISTANT

## 2023-09-18 PROCEDURE — 85025 COMPLETE CBC W/AUTO DIFF WBC: CPT | Performed by: PHYSICIAN ASSISTANT

## 2023-09-18 PROCEDURE — 83605 ASSAY OF LACTIC ACID: CPT | Performed by: EMERGENCY MEDICINE

## 2023-09-18 PROCEDURE — 82248 BILIRUBIN DIRECT: CPT | Performed by: EMERGENCY MEDICINE

## 2023-09-18 RX ORDER — SACCHAROMYCES BOULARDII 250 MG
250 CAPSULE ORAL 2 TIMES DAILY
Status: DISCONTINUED | OUTPATIENT
Start: 2023-09-18 | End: 2023-09-18

## 2023-09-18 RX ORDER — CALCIUM CARBONATE 500 MG/1
1000 TABLET, CHEWABLE ORAL DAILY PRN
Status: DISCONTINUED | OUTPATIENT
Start: 2023-09-18 | End: 2023-09-19 | Stop reason: HOSPADM

## 2023-09-18 RX ORDER — INSULIN LISPRO 100 [IU]/ML
1-6 INJECTION, SOLUTION INTRAVENOUS; SUBCUTANEOUS
Status: DISCONTINUED | OUTPATIENT
Start: 2023-09-18 | End: 2023-09-19 | Stop reason: HOSPADM

## 2023-09-18 RX ORDER — HYDRALAZINE HYDROCHLORIDE 20 MG/ML
5 INJECTION INTRAMUSCULAR; INTRAVENOUS EVERY 6 HOURS PRN
Status: DISCONTINUED | OUTPATIENT
Start: 2023-09-18 | End: 2023-09-19 | Stop reason: HOSPADM

## 2023-09-18 RX ORDER — ACETAMINOPHEN 325 MG/1
650 TABLET ORAL EVERY 6 HOURS PRN
Status: DISCONTINUED | OUTPATIENT
Start: 2023-09-18 | End: 2023-09-19 | Stop reason: HOSPADM

## 2023-09-18 RX ORDER — PANTOPRAZOLE SODIUM 40 MG/10ML
40 INJECTION, POWDER, LYOPHILIZED, FOR SOLUTION INTRAVENOUS
Status: DISCONTINUED | OUTPATIENT
Start: 2023-09-19 | End: 2023-09-19 | Stop reason: HOSPADM

## 2023-09-18 RX ORDER — METRONIDAZOLE 500 MG/100ML
500 INJECTION, SOLUTION INTRAVENOUS EVERY 8 HOURS
Status: DISCONTINUED | OUTPATIENT
Start: 2023-09-18 | End: 2023-09-18

## 2023-09-18 RX ORDER — LORAZEPAM 2 MG/ML
2 INJECTION INTRAMUSCULAR
Status: DISCONTINUED | OUTPATIENT
Start: 2023-09-18 | End: 2023-09-19 | Stop reason: HOSPADM

## 2023-09-18 RX ORDER — LANOLIN ALCOHOL/MO/W.PET/CERES
1 CREAM (GRAM) TOPICAL 3 TIMES DAILY
Status: DISCONTINUED | OUTPATIENT
Start: 2023-09-18 | End: 2023-09-19 | Stop reason: HOSPADM

## 2023-09-18 RX ORDER — ENOXAPARIN SODIUM 100 MG/ML
40 INJECTION SUBCUTANEOUS DAILY
Status: DISCONTINUED | OUTPATIENT
Start: 2023-09-19 | End: 2023-09-19 | Stop reason: HOSPADM

## 2023-09-18 RX ORDER — CEFTRIAXONE 2 G/50ML
2000 INJECTION, SOLUTION INTRAVENOUS EVERY 12 HOURS
Status: DISCONTINUED | OUTPATIENT
Start: 2023-09-19 | End: 2023-09-19 | Stop reason: HOSPADM

## 2023-09-18 RX ORDER — CEFTRIAXONE 1 G/50ML
1000 INJECTION, SOLUTION INTRAVENOUS EVERY 24 HOURS
Status: DISCONTINUED | OUTPATIENT
Start: 2023-09-19 | End: 2023-09-18

## 2023-09-18 RX ORDER — INSULIN LISPRO 100 [IU]/ML
1-6 INJECTION, SOLUTION INTRAVENOUS; SUBCUTANEOUS
Status: DISCONTINUED | OUTPATIENT
Start: 2023-09-19 | End: 2023-09-19 | Stop reason: HOSPADM

## 2023-09-18 RX ORDER — LANOLIN ALCOHOL/MO/W.PET/CERES
1 CREAM (GRAM) TOPICAL 3 TIMES DAILY
COMMUNITY

## 2023-09-18 RX ORDER — MAGNESIUM SULFATE HEPTAHYDRATE 40 MG/ML
2 INJECTION, SOLUTION INTRAVENOUS ONCE
Status: COMPLETED | OUTPATIENT
Start: 2023-09-18 | End: 2023-09-19

## 2023-09-18 RX ORDER — SIMETHICONE 80 MG
80 TABLET,CHEWABLE ORAL 4 TIMES DAILY PRN
Status: DISCONTINUED | OUTPATIENT
Start: 2023-09-18 | End: 2023-09-19 | Stop reason: HOSPADM

## 2023-09-18 RX ORDER — LORAZEPAM 2 MG/ML
2 INJECTION INTRAMUSCULAR ONCE
Status: DISCONTINUED | OUTPATIENT
Start: 2023-09-18 | End: 2023-09-18

## 2023-09-18 RX ORDER — ENOXAPARIN SODIUM 100 MG/ML
40 INJECTION SUBCUTANEOUS DAILY
Status: DISCONTINUED | OUTPATIENT
Start: 2023-09-19 | End: 2023-09-18

## 2023-09-18 RX ORDER — ONDANSETRON 2 MG/ML
4 INJECTION INTRAMUSCULAR; INTRAVENOUS ONCE
Status: COMPLETED | OUTPATIENT
Start: 2023-09-18 | End: 2023-09-18

## 2023-09-18 RX ORDER — SODIUM CHLORIDE, SODIUM GLUCONATE, SODIUM ACETATE, POTASSIUM CHLORIDE, MAGNESIUM CHLORIDE, SODIUM PHOSPHATE, DIBASIC, AND POTASSIUM PHOSPHATE .53; .5; .37; .037; .03; .012; .00082 G/100ML; G/100ML; G/100ML; G/100ML; G/100ML; G/100ML; G/100ML
125 INJECTION, SOLUTION INTRAVENOUS CONTINUOUS
Status: DISCONTINUED | OUTPATIENT
Start: 2023-09-18 | End: 2023-09-19 | Stop reason: HOSPADM

## 2023-09-18 RX ORDER — LISINOPRIL 5 MG/1
5 TABLET ORAL DAILY
Status: DISCONTINUED | OUTPATIENT
Start: 2023-09-19 | End: 2023-09-19 | Stop reason: HOSPADM

## 2023-09-18 RX ORDER — ONDANSETRON 2 MG/ML
4 INJECTION INTRAMUSCULAR; INTRAVENOUS EVERY 6 HOURS PRN
Status: DISCONTINUED | OUTPATIENT
Start: 2023-09-18 | End: 2023-09-19 | Stop reason: HOSPADM

## 2023-09-18 RX ORDER — CEFTRIAXONE 1 G/50ML
1000 INJECTION, SOLUTION INTRAVENOUS ONCE
Status: COMPLETED | OUTPATIENT
Start: 2023-09-18 | End: 2023-09-18

## 2023-09-18 RX ADMIN — DIBASIC SODIUM PHOSPHATE, MONOBASIC POTASSIUM PHOSPHATE AND MONOBASIC SODIUM PHOSPHATE 2 TABLET: 852; 155; 130 TABLET ORAL at 23:26

## 2023-09-18 RX ADMIN — MAGNESIUM SULFATE HEPTAHYDRATE 2 G: 40 INJECTION, SOLUTION INTRAVENOUS at 23:27

## 2023-09-18 RX ADMIN — ONDANSETRON 4 MG: 2 INJECTION INTRAMUSCULAR; INTRAVENOUS at 13:18

## 2023-09-18 RX ADMIN — SODIUM CHLORIDE, SODIUM GLUCONATE, SODIUM ACETATE, POTASSIUM CHLORIDE, MAGNESIUM CHLORIDE, SODIUM PHOSPHATE, DIBASIC, AND POTASSIUM PHOSPHATE 125 ML/HR: .53; .5; .37; .037; .03; .012; .00082 INJECTION, SOLUTION INTRAVENOUS at 21:09

## 2023-09-18 RX ADMIN — SODIUM CHLORIDE 1000 ML: 0.9 INJECTION, SOLUTION INTRAVENOUS at 13:18

## 2023-09-18 RX ADMIN — INSULIN LISPRO 3 UNITS: 100 INJECTION, SOLUTION INTRAVENOUS; SUBCUTANEOUS at 22:13

## 2023-09-18 RX ADMIN — VANCOMYCIN HYDROCHLORIDE 2000 MG: 1 INJECTION, POWDER, LYOPHILIZED, FOR SOLUTION INTRAVENOUS at 21:09

## 2023-09-18 RX ADMIN — ACYCLOVIR SODIUM 900 MG: 50 INJECTION, SOLUTION INTRAVENOUS at 21:30

## 2023-09-18 RX ADMIN — CEFTRIAXONE 1000 MG: 1 INJECTION, SOLUTION INTRAVENOUS at 18:22

## 2023-09-18 RX ADMIN — CEFTRIAXONE 2000 MG: 2 INJECTION, SOLUTION INTRAVENOUS at 23:28

## 2023-09-18 NOTE — ASSESSMENT & PLAN NOTE
· Patient stating he has generalized weakness likely secondary to poor oral intake, nausea, vomiting, diarrhea. Also states he is having some hallucinations as well - thinking that his mother is present but not currently around  · Will start on IV abx and IVF rehydration  · Patient states he has also been having difficulty with ambulation feeling very uneasy on his feet. Starts shaking when he goes to ambulate. Will consult PT/OT  · Also has been having numbness and weakness into the left leg and right arm? States that this has been going on since he had shingles 2 years ago, but also is unsure if this is new over the last week or so.    · Will do CT head

## 2023-09-18 NOTE — ASSESSMENT & PLAN NOTE
· POA with complaints of nausea and vomiting since Saturday. Has been having increasing weakness and decreased oral intake. Has not been taking medications secondary to vomiting. . Denies sick contacts. No recent ingestion of fish, uncooked poultry or raw eggs. No recent well water consumption. Does have crampy abdominal pain when he is going to have vomiting or diarrhea. · US RUQ:  Large amount of echogenic material nearly filling the gallbladder lumen presumably representing tumefactive sludge, without findings suspicious for a solid lesion on recent MRI abdomen.  No evidence of acute cholecystitis  · MRCP ordered  · Lipase normal  · Elevation of LFTs and Tbilirubin  · WBC elevated  · Lactic normal   · Will order stool samples  · NPO at this time with IVF rehydration  · Surgery consulted

## 2023-09-18 NOTE — H&P
[address] [facility]  H&P  Name: Eliezer Spear 38 y.o. male I MRN: [id number]  Unit/Bed#: [facility] I Date of Admission: 9/18/2023   Date of Service: 9/18/2023 I Hospital Day: 0      Assessment/Plan   * Sepsis [facility])  Assessment & Plan  Presents to ED with nausea, vomiting, diarrhea for the last 3 days. Patient also with poor oral intake for the last 3 days along with generalized weakness. Met SIRS criteria with leukocytosis of 16.47 and tachycardia  Source of infection: suspect GI source, potentially gallbladder/CBD  UA not infected  Blood cultures pending  RUQ U/S: Large amount of echogenic material nearly filling the gallbladder lumen presumably representing tumefactive sludge, without findings suspicious for a solid lesion on recent MRI abdomen. No evidence of acute cholecystitis  Given 1L NS in the ED, will continue with IVF rehydration. Start on rocephin and flagyl  MRCP ordered   Will keep patient NPO with IVF rehydration at this time  Stool samples ordered    Lab Results   Component Value Date    WBC 16.47 (H) 09/18/2023    LACTICACID 1.2 09/18/2023       Nausea vomiting and diarrhea  Assessment & Plan  · POA with complaints of nausea and vomiting since Saturday. Has been having increasing weakness and decreased oral intake. Has not been taking medications secondary to vomiting. . Denies sick contacts. No recent ingestion of fish, uncooked poultry or raw eggs. No recent well water consumption. Does have crampy abdominal pain when he is going to have vomiting or diarrhea. · US RUQ:  Large amount of echogenic material nearly filling the gallbladder lumen presumably representing tumefactive sludge, without findings suspicious for a solid lesion on recent MRI abdomen.  No evidence of acute cholecystitis  · MRCP ordered  · Lipase normal  · Elevation of LFTs and Tbilirubin  · WBC elevated  · Lactic normal   · Will order stool samples  · NPO at this time with IVF rehydration  · Surgery consulted    Hyponatremia  Assessment & Plan  Presents to ED with nausea vomiting and diarrhea, for last 3 days. Patient has been having crampy abdominal pain as well with these episodes. Has been having poor oral intake secondary to the nausea and vomiting. Has not been able to eat or drink much. Has not been taking his medications either due to pain. Sodium on admission 130, corrected sodium: 135 - will control sugars better. Appears dry on initial exam  Urine sodium, urine creatinine, urine osmo, and serum osmo ordered  TSH ordered   Start on IVF    Lab Results   Component Value Date    SODIUM 130 (L) 09/18/2023       Generalized weakness  Assessment & Plan  · Patient stating he has generalized weakness likely secondary to poor oral intake, nausea, vomiting, diarrhea. Also states he is having some hallucinations as well - thinking that his mother is present but not currently around  · Will start on IV abx and IVF rehydration  · Patient states he has also been having difficulty with ambulation feeling very uneasy on his feet. Starts shaking when he goes to ambulate. Will consult PT/OT  · Also has been having numbness and weakness into the left leg and right arm? States that this has been going on since he had shingles 2 years ago, but also is unsure if this is new over the last week or so.    · Will do CT head    Diabetes mellitus ([address])  Assessment & Plan    Lab Results   Component Value Date    HGBA1C 12.3 (H) 08/19/2021     · History of diabetes on glipizide 5 mg   · Will hold glipizide  · Update A1c  · SSI at this time and avoid hypoglycemia  · Will make NPO so q6h sugar checks    Hypertension  Assessment & Plan  · Currently taking lisinopril 5 mg qd and HCTZ 25 mg qd  · Did not take BP medications this morning and BP elevated in the /119  · BP subsequently improved in the ED without medication  · Will resume home BP medications  · Avoid hypotension        VTE Pharmacologic Prophylaxis: VTE Score: 4 Moderate Risk (Score 3-4) - Pharmacological DVT Prophylaxis Ordered: enoxaparin (Lovenox). Code Status: Level 1 - Full Code   Discussion with family: Patient declined call to . Anticipated Length of Stay: Patient will be admitted on an inpatient basis with an anticipated length of stay of greater than 2 midnights secondary to sepsis, nausea, vomiting, diarrhea. Total Time Spent on Date of Encounter in care of patient: 65 mins. This time was spent on one or more of the following: performing physical exam; counseling and coordination of care; obtaining or reviewing history; documenting in the medical record; reviewing/ordering tests, medications or procedures; communicating with other healthcare professionals and discussing with patient's family/caregivers. Chief Complaint: nausea, vomiting, diarrhea, generalized weakness    History of Present Illness:  Lyndsey Johnson is a 38 y.o. male with a PMH of HTN, diabetes who presents with generalized weakness, nausea,, diarrhea starting Saturday. Patient states that he has been having poor oral intake in addition to nausea vomiting diarrhea that started on Saturday. He states that he has also been having hot and cold sweats, lightheadedness, hallucinations. Has not been able to eat or drink secondary to this. He has also not been sleeping well due to feeling sick and not been taking his medications secondary to vomiting. Has not been checking his blood sugars either. Patient does not have any sick contacts. Denies any abnormal food consumption. Has not eaten seafood, uncooked poultry, undercooked eggs. Patient also does have abdominal pain especially when he is about to vomit or have an episode of diarrhea. Currently without any abdominal pain. Denies any chest pain, shortness of breath, fever, chills, cough, congestion. Does also endorse some unsteadiness on his feet.  States that he has been having some weakness in the left leg and numbness at times in the left leg and also right arm. Said he had shingles around 2 years ago on the back of the left leg and has been having some symptoms since then, but is unsure of this for certain. He believes it may have been occurring more recently as well. States that he feels quite unsteady on his feet and when he goes to stand sometimes his leg feels stiff. Does have history of sciatica as well, but no acute trauma to the back. Also complains of a rash that has been present on the back of his thighs for months, has followed with dermatology and was given hydrocortisone cream for what appears to be psoriasis. Review of Systems:  Review of Systems   Constitutional: Positive for activity change, appetite change, chills, fatigue and fever. HENT: Negative. Eyes: Negative. Respiratory: Negative for cough, chest tightness, shortness of breath and wheezing. Cardiovascular: Negative for chest pain, palpitations and leg swelling. Gastrointestinal: Positive for abdominal pain, diarrhea, nausea and vomiting. Negative for abdominal distention and constipation. Endocrine: Negative. Genitourinary: Negative for difficulty urinating, dysuria, frequency, hematuria and urgency. Musculoskeletal: Positive for gait problem (states he feels unsteady) and myalgias. Negative for arthralgias and back pain. Skin: Negative. Allergic/Immunologic: Negative. Neurological: Positive for dizziness, weakness and light-headedness. Negative for syncope and headaches. Hematological: Negative. Psychiatric/Behavioral: Positive for hallucinations. Negative for confusion. The patient is not nervous/anxious.         Past Medical and Surgical History:   Past Medical History:   Diagnosis Date   • Allergic    • Diabetes mellitus ([address])    • Gout    • Hypertension    • Psoriasis    • Sinus infection        Past Surgical History:   Procedure Laterality Date   • DENTAL SURGERY     • KNEE SURGERY Left    • NOSE SURGERY • WRIST FRACTURE SURGERY         Meds/Allergies:  Prior to Admission medications    Medication Sig Start Date End Date Taking? Authorizing Provider   allopurinol (ZYLOPRIM) 300 mg tablet Take 300 mg by mouth daily    Historical Provider, MD   Cinnamon 500 MG TABS Take 1 tablet by mouth daily    Historical Provider, MD   Sarah Peasant Therman Salt) 2 % OINT Apply topically  Patient not taking: Reported on 6/8/2022    Historical Provider, MD   glipiZIDE (GLUCOTROL) 5 mg tablet Take 5 mg by mouth daily 1/27/22   Historical Provider, MD   hydrochlorothiazide (HYDRODIURIL) 25 mg tablet Take 25 mg by mouth daily    Historical Provider, MD   hydrocortisone 2.5 % cream APPLY TWICE A DAY AS NEEDED FOR PSORIASIS ON FACE. USE SPARINGLY 2/1/22   Historical Provider, MD   lisinopril (ZESTRIL) 5 mg tablet TAKE 1 TABLET BY MOUTH EVERY DAY FOR ELEVATED BLOOD PRESSURE 4/12/23   Historical Provider, MD   Multiple Vitamins-Minerals (MULTIVITAMIN WITH MINERALS) tablet Take 1 tablet by mouth daily    Historical Provider, MD   naproxen (Naprosyn) 500 mg tablet Take 1 tablet (500 mg total) by mouth in the morning and 1 tablet (500 mg total) in the evening. Take with meals. Patient not taking: Reported on 5/26/2022 5/17/22   Felisa Caceres MD   nystatin-triamcinolone [facility]) cream Apply topically Three times a day 10/21/20 10/21/21  Historical Provider, MD   Potassium 75 MG TABS Take 75 mg by mouth daily    Historical Provider, MD   saccharomyces boulardii (FLORASTOR) 250 mg capsule Take 250 mg by mouth 2 (two) times a day    Historical Provider, MD   valACYclovir (VALTREX) 1,000 mg tablet Take 1 tablet (1,000 mg total) by mouth 3 (three) times a day for 7 days 3/1/21 3/8/21  Jocelin Antonio MD     I have reviewed home medications with patient personally. Allergies:    Allergies   Allergen Reactions   • Kiwi Extract - Food Allergy Tongue Swelling       Social History:  Marital Status: /Civil Union   Occupation:   Patient Pre-hospital Living Situation: Home  Patient Pre-hospital Level of Mobility: walks  Patient Pre-hospital Diet Restrictions:   Substance Use History:   Social History     Substance and Sexual Activity   Alcohol Use Yes    Comment: socially      Social History     Tobacco Use   Smoking Status Former   • Types: Cigarettes   • Quit date:    • Years since quittin.7   Smokeless Tobacco Former   • Types: Chew     Social History     Substance and Sexual Activity   Drug Use Never       Family History:  Family History   Problem Relation Age of Onset   • Heart disease Mother    • Diabetes Mother    • Parkinsonism Father    • Heart disease Father        Physical Exam:     Vitals:   Blood Pressure: (!) 163/109 (23 180)  Pulse: 94 (23)  Temperature: 99 °F (37.2 °C) (23)  Temp Source: Temporal (23)  Respirations: 19 (23)  Height: 6' 3" (190.5 cm) (23)  Weight - Scale: 95.7 kg (211 lb) (23)  SpO2: 95 % (23)    Physical Exam  Vitals reviewed. Constitutional:       General: He is not in acute distress. Appearance: He is not ill-appearing or toxic-appearing. HENT:      Head: Normocephalic and atraumatic. Nose: Nose normal.      Mouth/Throat:      Mouth: Mucous membranes are dry. Eyes:      Pupils: Pupils are equal, round, and reactive to light. Cardiovascular:      Rate and Rhythm: Regular rhythm. Tachycardia present. Heart sounds: No murmur heard. Pulmonary:      Effort: No respiratory distress. Breath sounds: No stridor. No wheezing. Abdominal:      General: Bowel sounds are normal. There is no distension. Palpations: Abdomen is soft. There is no mass. Tenderness: There is no abdominal tenderness. There is no guarding. Musculoskeletal:         General: Normal range of motion. Right lower leg: No edema. Left lower leg: No edema.       Comments: Patient with shaking when he is standing, slightly unsteady on feet   Skin:     General: Skin is warm and dry. Findings: Rash (bilateral posterior thighs, erythematous maculopapular rash) present. Neurological:      General: No focal deficit present. Mental Status: He is alert and oriented to person, place, and time. Comments: No physical weakness on neurological examination, but patient is very unsteady on feet when ambulating and shaky   Psychiatric:         Mood and Affect: Mood normal.         Behavior: Behavior normal.          Additional Data:     Lab Results:  Results from last 7 days   Lab Units 09/18/23  1313   WBC Thousand/uL 16.47*   HEMOGLOBIN g/dL 14.8   HEMATOCRIT % 42.7   PLATELETS Thousands/uL 90*   NEUTROS PCT % 84*   LYMPHS PCT % 5*   MONOS PCT % 8   EOS PCT % 2     Results from last 7 days   Lab Units 09/18/23  1313   SODIUM mmol/L 130*   POTASSIUM mmol/L 4.1   CHLORIDE mmol/L 94*   CO2 mmol/L 25   BUN mg/dL 14   CREATININE mg/dL 0.85   ANION GAP mmol/L 11   CALCIUM mg/dL 9.6   ALBUMIN g/dL 4.1   TOTAL BILIRUBIN mg/dL 2.82*   ALK PHOS U/L 153*   ALT U/L 31   AST U/L 65*   GLUCOSE RANDOM mg/dL 314*         Results from last 7 days   Lab Units 09/18/23  1246   POC GLUCOSE mg/dl 283*         Results from last 7 days   Lab Units 09/18/23  1313   LACTIC ACID mmol/L 1.2       Lines/Drains:  Invasive Devices     Peripheral Intravenous Line  Duration           Peripheral IV 09/18/23 Left Antecubital <1 day              Imaging: Reviewed radiology reports from this admission including: RUQ U/S  MRI abdomen wo contrast and mrcp   Final Result by Nima Martínez MD (09/18 1900)      Dependent sludge within the gallbladder without suspicious focal lesion on this noncontrast MRI examination. .      Trace volume perihepatic ascites of indeterminate etiology. Workstation performed: [id number]         US right upper quadrant   Final Result by Honey Gasca MD (09/18 1366)      1.   Large amount of echogenic material nearly filling the gallbladder lumen presumably representing tumefactive sludge, without findings suspicious for a solid lesion on recent MRI abdomen. No evidence of acute cholecystitis. 2.  Mild hepatomegaly and steatosis. The study was marked in EPIC for significant notification. Workstation performed: [id number]         CT head wo contrast    (Results Pending)       EKG and Other Studies Reviewed on Admission:   · EKG: No EKG obtained. ** Please Note: This note has been constructed using a voice recognition system.  **

## 2023-09-18 NOTE — PLAN OF CARE
Problem: GASTROINTESTINAL - ADULT  Goal: Minimal or absence of nausea and/or vomiting  Description: INTERVENTIONS:  - Administer IV fluids if ordered to ensure adequate hydration  - Maintain NPO status until nausea and vomiting are resolved  - Nasogastric tube if ordered  - Administer ordered antiemetic medications as needed  - Provide nonpharmacologic comfort measures as appropriate  - Advance diet as tolerated, if ordered  - Consider nutrition services referral to assist patient with adequate nutrition and appropriate food choices  Outcome: Progressing     Problem: GASTROINTESTINAL - ADULT  Goal: Maintains adequate nutritional intake  Description: INTERVENTIONS:  - Monitor percentage of each meal consumed  - Identify factors contributing to decreased intake, treat as appropriate  - Assist with meals as needed  - Monitor I&O, weight, and lab values if indicated  - Obtain nutrition services referral as needed  Outcome: Progressing     Problem: MUSCULOSKELETAL - ADULT  Goal: Maintain or return mobility to safest level of function  Description: INTERVENTIONS:  - Assess patient's ability to carry out ADLs; assess patient's baseline for ADL function and identify physical deficits which impact ability to perform ADLs (bathing, care of mouth/teeth, toileting, grooming, dressing, etc.)  - Assess/evaluate cause of self-care deficits   - Assess range of motion  - Assess patient's mobility  - Assess patient's need for assistive devices and provide as appropriate  - Encourage maximum independence but intervene and supervise when necessary  - Involve family in performance of ADLs  - Assess for home care needs following discharge   - Consider OT consult to assist with ADL evaluation and planning for discharge  - Provide patient education as appropriate  Outcome: Progressing

## 2023-09-18 NOTE — ASSESSMENT & PLAN NOTE
Presents to ED with nausea, vomiting, diarrhea for the last 3 days. Patient also with poor oral intake for the last 3 days along with generalized weakness. Met SIRS criteria with leukocytosis of 16.47 and tachycardia  Source of infection: suspect GI source, potentially gallbladder/CBD  UA not infected  Blood cultures pending  RUQ U/S: Large amount of echogenic material nearly filling the gallbladder lumen presumably representing tumefactive sludge, without findings suspicious for a solid lesion on recent MRI abdomen. No evidence of acute cholecystitis  Given 1L NS in the ED, will continue with IVF rehydration.    Start on rocephin and flagyl  MRCP ordered   Will keep patient NPO with IVF rehydration at this time  Stool samples ordered    Lab Results   Component Value Date    WBC 16.47 (H) 09/18/2023    LACTICACID 1.2 09/18/2023

## 2023-09-18 NOTE — ED NOTES
PT transported to MRI, to be transported to unit after completion of MRI. Maksim lacey sent to [facility] charge RN.       Alexander Ortiz RN  09/18/23 1640

## 2023-09-18 NOTE — ASSESSMENT & PLAN NOTE
Presents to ED with nausea vomiting and diarrhea, for last 3 days. Patient has been having crampy abdominal pain as well with these episodes. Has been having poor oral intake secondary to the nausea and vomiting. Has not been able to eat or drink much. Has not been taking his medications either due to pain. Sodium on admission 130, corrected sodium: 135 - will control sugars better.     Appears dry on initial exam  Urine sodium, urine creatinine, urine osmo, and serum osmo ordered  TSH ordered   Start on IVF    Lab Results   Component Value Date    SODIUM 130 (L) 09/18/2023

## 2023-09-18 NOTE — ASSESSMENT & PLAN NOTE
Lab Results   Component Value Date    HGBA1C 12.3 (H) 08/19/2021     · History of diabetes on glipizide 5 mg   · Will hold glipizide  · Update A1c  · SSI at this time and avoid hypoglycemia  · Will make NPO so q6h sugar checks

## 2023-09-18 NOTE — ASSESSMENT & PLAN NOTE
· Currently taking lisinopril 5 mg qd and HCTZ 25 mg qd  · Did not take BP medications this morning and BP elevated in the /119  · BP subsequently improved in the ED without medication  · Will resume home BP medications  · Avoid hypotension

## 2023-09-19 ENCOUNTER — HOSPITAL ENCOUNTER (INPATIENT)
Facility: HOSPITAL | Age: 43
LOS: 3 days | Discharge: HOME | End: 2023-09-22
Attending: INTERNAL MEDICINE | Admitting: INTERNAL MEDICINE
Payer: COMMERCIAL

## 2023-09-19 ENCOUNTER — APPOINTMENT (INPATIENT)
Dept: RADIOLOGY | Facility: HOSPITAL | Age: 43
End: 2023-09-19
Payer: COMMERCIAL

## 2023-09-19 VITALS
WEIGHT: 211 LBS | SYSTOLIC BLOOD PRESSURE: 118 MMHG | BODY MASS INDEX: 26.24 KG/M2 | DIASTOLIC BLOOD PRESSURE: 64 MMHG | RESPIRATION RATE: 31 BRPM | TEMPERATURE: 99.9 F | OXYGEN SATURATION: 93 % | HEART RATE: 104 BPM | HEIGHT: 75 IN

## 2023-09-19 DIAGNOSIS — R11.10 VOMITING: ICD-10-CM

## 2023-09-19 DIAGNOSIS — G93.40 ACUTE ENCEPHALOPATHY: Primary | ICD-10-CM

## 2023-09-19 DIAGNOSIS — R56.9 SEIZURE-LIKE ACTIVITY (HCC): ICD-10-CM

## 2023-09-19 LAB
2HR DELTA HS TROPONIN: 0 NG/L
4HR DELTA HS TROPONIN: 6 NG/L
ALBUMIN SERPL BCP-MCNC: 3.7 G/DL (ref 3.5–5)
ALP SERPL-CCNC: 126 U/L (ref 34–104)
ALT SERPL W P-5'-P-CCNC: 27 U/L (ref 7–52)
ANION GAP SERPL CALCULATED.3IONS-SCNC: 11 MMOL/L
APPEARANCE CSF: CLEAR
AST SERPL W P-5'-P-CCNC: 52 U/L (ref 13–39)
ATRIAL RATE: 105 BPM
ATRIAL RATE: 110 BPM
ATRIAL RATE: 110 BPM
ATRIAL RATE: 114 BPM
B BURGDOR IGG+IGM SER QL IA: NEGATIVE
BASOPHILS # BLD AUTO: 0.06 THOUSANDS/ÂΜL (ref 0–0.1)
BASOPHILS NFR BLD AUTO: 0 % (ref 0–1)
BILIRUB SERPL-MCNC: 2.07 MG/DL (ref 0.2–1)
BUN SERPL-MCNC: 9 MG/DL (ref 5–25)
C GATTII+NEOFOR DNA CSF QL NAA+NON-PROBE: NOT DETECTED
CA-I BLD-SCNC: 0.97 MMOL/L (ref 1.12–1.32)
CALCIUM SERPL-MCNC: 8.4 MG/DL (ref 8.4–10.2)
CARDIAC TROPONIN I PNL SERPL HS: 22 NG/L (ref 8–18)
CARDIAC TROPONIN I PNL SERPL HS: 66 NG/L
CARDIAC TROPONIN I PNL SERPL HS: 72 NG/L
CHLORIDE SERPL-SCNC: 102 MMOL/L (ref 96–108)
CMV DNA CSF QL NAA+NON-PROBE: NOT DETECTED
CO2 SERPL-SCNC: 24 MMOL/L (ref 21–32)
CREAT SERPL-MCNC: 0.81 MG/DL (ref 0.6–1.3)
CRYPTOC AG CSF QL IA: NEGATIVE
E COLI K1 DNA CSF QL NAA+NON-PROBE: NOT DETECTED
EOSINOPHIL # BLD AUTO: 0.23 THOUSAND/ÂΜL (ref 0–0.61)
EOSINOPHIL NFR BLD AUTO: 2 % (ref 0–6)
ERYTHROCYTE [DISTWIDTH] IN BLOOD BY AUTOMATED COUNT: 13.1 % (ref 11.6–15.1)
EST. AVERAGE GLUCOSE BLD GHB EST-MCNC: 157 MG/DL
EV RNA CSF QL NAA+NON-PROBE: NOT DETECTED
FOLATE SERPL-MCNC: >22.3 NG/ML
GFR SERPL CREATININE-BSD FRML MDRD: 108 ML/MIN/1.73SQ M
GLUCOSE CSF-MCNC: 120 MG/DL (ref 40–70)
GLUCOSE SERPL-MCNC: 140 MG/DL (ref 65–140)
GLUCOSE SERPL-MCNC: 151 MG/DL (ref 65–140)
GLUCOSE SERPL-MCNC: 153 MG/DL (ref 65–140)
GLUCOSE SERPL-MCNC: 156 MG/DL (ref 65–140)
GLUCOSE SERPL-MCNC: 173 MG/DL (ref 65–140)
GLUCOSE SERPL-MCNC: 176 MG/DL (ref 65–140)
GLUCOSE SERPL-MCNC: 185 MG/DL (ref 65–140)
GP B STREP DNA CSF QL NAA+NON-PROBE: NOT DETECTED
GRAM STN SPEC: NORMAL
GRAM STN SPEC: NORMAL
HAEM INFLU DNA CSF QL NAA+NON-PROBE: NOT DETECTED
HAV IGM SER QL: NORMAL
HBA1C MFR BLD: 7.1 %
HBV CORE IGM SER QL: NORMAL
HBV SURFACE AG SER QL: NORMAL
HCT VFR BLD AUTO: 37.6 % (ref 36.5–49.3)
HCV AB SER QL: NORMAL
HGB BLD-MCNC: 13.1 G/DL (ref 12–17)
HHV6 DNA CSF QL NAA+NON-PROBE: NOT DETECTED
HSV1 DNA CSF QL NAA+NON-PROBE: NOT DETECTED
HSV1 DNA CSF QL NAA+PROBE: NOT DETECTED
HSV1 DNA CSF QL NAA+PROBE: NOT DETECTED
HSV2 DNA CSF QL NAA+NON-PROBE: NOT DETECTED
IMM GRANULOCYTES # BLD AUTO: 0.06 THOUSAND/UL (ref 0–0.2)
IMM GRANULOCYTES NFR BLD AUTO: 0 % (ref 0–2)
L MONOCYTOG DNA CSF QL NAA+NON-PROBE: NOT DETECTED
L PNEUMO1 AG UR QL IA.RAPID: NEGATIVE
LYMPHOCYTES # BLD AUTO: 1.07 THOUSANDS/ÂΜL (ref 0.6–4.47)
LYMPHOCYTES NFR BLD AUTO: 7 % (ref 14–44)
LYMPHOCYTES NFR CSF MANUAL: 80 %
MAGNESIUM SERPL-MCNC: 1.9 MG/DL (ref 1.9–2.7)
MCH RBC QN AUTO: 34.4 PG (ref 26.8–34.3)
MCHC RBC AUTO-ENTMCNC: 34.8 G/DL (ref 31.4–37.4)
MCV RBC AUTO: 99 FL (ref 82–98)
MONOCYTES # BLD AUTO: 1.34 THOUSAND/ÂΜL (ref 0.17–1.22)
MONOCYTES NFR BLD AUTO: 9 % (ref 4–12)
N MEN DNA CSF QL NAA+NON-PROBE: NOT DETECTED
NEUTROPHILS # BLD AUTO: 12.67 THOUSANDS/ÂΜL (ref 1.85–7.62)
NEUTROPHILS NFR CSF MANUAL: 20 %
NEUTS SEG NFR BLD AUTO: 82 % (ref 43–75)
NRBC BLD AUTO-RTO: 0 /100 WBCS
OSMOLALITY UR/SERPL-RTO: 297 MMOL/KG (ref 282–298)
P AXIS: 41 DEGREES
P AXIS: 44 DEGREES
P AXIS: 48 DEGREES
P AXIS: 51 DEGREES
PARECHOVIRUS A RNA CSF QL NAA+NON-PROBE: NOT DETECTED
PHOSPHATE SERPL-MCNC: 3.2 MG/DL (ref 2.7–4.5)
PLATELET # BLD AUTO: 82 THOUSANDS/UL (ref 149–390)
PMV BLD AUTO: 11.1 FL (ref 8.9–12.7)
POTASSIUM SERPL-SCNC: 3.7 MMOL/L (ref 3.5–5.3)
PR INTERVAL: 148 MS
PR INTERVAL: 150 MS
PR INTERVAL: 162 MS
PR INTERVAL: 166 MS
PROCALCITONIN SERPL-MCNC: 0.15 NG/ML
PROLACTIN SERPL-MCNC: 11.16 NG/ML
PROT CSF-MCNC: 65 MG/DL (ref 15–45)
PROT SERPL-MCNC: 8 G/DL (ref 6.4–8.4)
QRS AXIS: 70 DEGREES
QRS AXIS: 71 DEGREES
QRS AXIS: 73 DEGREES
QRS AXIS: 73 DEGREES
QRSD INTERVAL: 114 MS
QT INTERVAL: 362 MS
QT INTERVAL: 364 MS
QT INTERVAL: 372 MS
QT INTERVAL: 384 MS
QTC INTERVAL: 489 MS
QTC INTERVAL: 492 MS
QTC INTERVAL: 507 MS
QTC INTERVAL: 512 MS
RBC # BLD AUTO: 3.81 MILLION/UL (ref 3.88–5.62)
RBC # CSF MANUAL: 17 UL (ref 0–10)
S PNEUM AG UR QL: NEGATIVE
S PNEUM DNA CSF QL NAA+NON-PROBE: NOT DETECTED
SODIUM SERPL-SCNC: 137 MMOL/L (ref 135–147)
T WAVE AXIS: 55 DEGREES
T WAVE AXIS: 57 DEGREES
T WAVE AXIS: 65 DEGREES
T WAVE AXIS: 79 DEGREES
TOTAL CELLS COUNTED BLD: NO
TOTAL CELLS COUNTED SPEC: 5
TRIGL SERPL-MCNC: 79 MG/DL
TUBE # CSF: 4
VANCOMYCIN SERPL-MCNC: 13.2 UG/ML (ref 10–20)
VENTRICULAR RATE: 105 BPM
VENTRICULAR RATE: 110 BPM
VENTRICULAR RATE: 110 BPM
VENTRICULAR RATE: 114 BPM
VIT B12 SERPL-MCNC: 818 PG/ML (ref 180–914)
VZV DNA CSF QL NAA+NON-PROBE: NOT DETECTED
WBC # BLD AUTO: 15.43 THOUSAND/UL (ref 4.31–10.16)
WBC # CSF AUTO: 1 /UL (ref 0–5)

## 2023-09-19 PROCEDURE — 84157 ASSAY OF PROTEIN OTHER: CPT | Performed by: PSYCHIATRY & NEUROLOGY

## 2023-09-19 PROCEDURE — 80053 COMPREHEN METABOLIC PANEL: CPT | Performed by: REGISTERED NURSE

## 2023-09-19 PROCEDURE — 87102 FUNGUS ISOLATION CULTURE: CPT | Performed by: PSYCHIATRY & NEUROLOGY

## 2023-09-19 PROCEDURE — 99238 HOSP IP/OBS DSCHRG MGMT 30/<: CPT | Performed by: PHYSICIAN ASSISTANT

## 2023-09-19 PROCEDURE — 97163 PT EVAL HIGH COMPLEX 45 MIN: CPT

## 2023-09-19 PROCEDURE — 87529 HSV DNA AMP PROBE: CPT | Performed by: PSYCHIATRY & NEUROLOGY

## 2023-09-19 PROCEDURE — 62270 DX LMBR SPI PNXR: CPT | Performed by: ANESTHESIOLOGY

## 2023-09-19 PROCEDURE — 84484 ASSAY OF TROPONIN QUANT: CPT | Performed by: REGISTERED NURSE

## 2023-09-19 PROCEDURE — 87449 NOS EACH ORGANISM AG IA: CPT | Performed by: PSYCHIATRY & NEUROLOGY

## 2023-09-19 PROCEDURE — 84145 PROCALCITONIN (PCT): CPT | Performed by: REGISTERED NURSE

## 2023-09-19 PROCEDURE — 97167 OT EVAL HIGH COMPLEX 60 MIN: CPT

## 2023-09-19 PROCEDURE — 87476 LYME DIS DNA AMP PROBE: CPT | Performed by: PSYCHIATRY & NEUROLOGY

## 2023-09-19 PROCEDURE — 82948 REAGENT STRIP/BLOOD GLUCOSE: CPT

## 2023-09-19 PROCEDURE — 009U3ZX DRAINAGE OF SPINAL CANAL, PERCUTANEOUS APPROACH, DIAGNOSTIC: ICD-10-PCS | Performed by: PSYCHIATRY & NEUROLOGY

## 2023-09-19 PROCEDURE — 84478 ASSAY OF TRIGLYCERIDES: CPT | Performed by: PSYCHIATRY & NEUROLOGY

## 2023-09-19 PROCEDURE — 83735 ASSAY OF MAGNESIUM: CPT | Performed by: REGISTERED NURSE

## 2023-09-19 PROCEDURE — 87529 HSV DNA AMP PROBE: CPT

## 2023-09-19 PROCEDURE — NC001 PR NO CHARGE: Performed by: ANESTHESIOLOGY

## 2023-09-19 PROCEDURE — 82945 GLUCOSE OTHER FLUID: CPT | Performed by: PSYCHIATRY & NEUROLOGY

## 2023-09-19 PROCEDURE — 84484 ASSAY OF TROPONIN QUANT: CPT

## 2023-09-19 PROCEDURE — 87798 DETECT AGENT NOS DNA AMP: CPT | Performed by: PSYCHIATRY & NEUROLOGY

## 2023-09-19 PROCEDURE — 80074 ACUTE HEPATITIS PANEL: CPT | Performed by: REGISTERED NURSE

## 2023-09-19 PROCEDURE — 87483 CNS DNA AMP PROBE TYPE 12-25: CPT | Performed by: PSYCHIATRY & NEUROLOGY

## 2023-09-19 PROCEDURE — 99291 CRITICAL CARE FIRST HOUR: CPT | Performed by: ANESTHESIOLOGY

## 2023-09-19 PROCEDURE — 87040 BLOOD CULTURE FOR BACTERIA: CPT | Performed by: REGISTERED NURSE

## 2023-09-19 PROCEDURE — C9113 INJ PANTOPRAZOLE SODIUM, VIA: HCPCS | Performed by: REGISTERED NURSE

## 2023-09-19 PROCEDURE — 84100 ASSAY OF PHOSPHORUS: CPT | Performed by: REGISTERED NURSE

## 2023-09-19 PROCEDURE — 89050 BODY FLUID CELL COUNT: CPT | Performed by: PSYCHIATRY & NEUROLOGY

## 2023-09-19 PROCEDURE — 82330 ASSAY OF CALCIUM: CPT | Performed by: REGISTERED NURSE

## 2023-09-19 PROCEDURE — 71045 X-RAY EXAM CHEST 1 VIEW: CPT

## 2023-09-19 PROCEDURE — 87070 CULTURE OTHR SPECIMN AEROBIC: CPT | Performed by: PSYCHIATRY & NEUROLOGY

## 2023-09-19 PROCEDURE — 89051 BODY FLUID CELL COUNT: CPT | Performed by: PSYCHIATRY & NEUROLOGY

## 2023-09-19 PROCEDURE — 80202 ASSAY OF VANCOMYCIN: CPT | Performed by: REGISTERED NURSE

## 2023-09-19 PROCEDURE — 87449 NOS EACH ORGANISM AG IA: CPT | Performed by: PHYSICIAN ASSISTANT

## 2023-09-19 PROCEDURE — 84484 ASSAY OF TROPONIN QUANT: CPT | Performed by: PHYSICIAN ASSISTANT

## 2023-09-19 PROCEDURE — 85025 COMPLETE CBC W/AUTO DIFF WBC: CPT | Performed by: REGISTERED NURSE

## 2023-09-19 PROCEDURE — NC001 PR NO CHARGE: Performed by: PHYSICIAN ASSISTANT

## 2023-09-19 RX ORDER — SIMETHICONE 80 MG
80 TABLET,CHEWABLE ORAL 4 TIMES DAILY PRN
Status: CANCELLED | OUTPATIENT
Start: 2023-09-19

## 2023-09-19 RX ORDER — INSULIN LISPRO 100 [IU]/ML
1-6 INJECTION, SOLUTION INTRAVENOUS; SUBCUTANEOUS
Status: DISCONTINUED | OUTPATIENT
Start: 2023-09-19 | End: 2023-09-22 | Stop reason: HOSPADM

## 2023-09-19 RX ORDER — LIDOCAINE HYDROCHLORIDE 10 MG/ML
10 INJECTION, SOLUTION EPIDURAL; INFILTRATION; INTRACAUDAL; PERINEURAL ONCE
Status: DISCONTINUED | OUTPATIENT
Start: 2023-09-19 | End: 2023-09-22 | Stop reason: HOSPADM

## 2023-09-19 RX ORDER — SIMETHICONE 80 MG
80 TABLET,CHEWABLE ORAL 4 TIMES DAILY PRN
Status: DISCONTINUED | OUTPATIENT
Start: 2023-09-19 | End: 2023-09-22 | Stop reason: HOSPADM

## 2023-09-19 RX ORDER — CALCIUM CARBONATE 500 MG/1
1000 TABLET, CHEWABLE ORAL DAILY PRN
Status: DISCONTINUED | OUTPATIENT
Start: 2023-09-19 | End: 2023-09-22 | Stop reason: HOSPADM

## 2023-09-19 RX ORDER — INSULIN LISPRO 100 [IU]/ML
1-6 INJECTION, SOLUTION INTRAVENOUS; SUBCUTANEOUS
Status: CANCELLED | OUTPATIENT
Start: 2023-09-19

## 2023-09-19 RX ORDER — MIDAZOLAM HYDROCHLORIDE 2 MG/2ML
2 INJECTION, SOLUTION INTRAMUSCULAR; INTRAVENOUS ONCE
Status: COMPLETED | OUTPATIENT
Start: 2023-09-19 | End: 2023-09-19

## 2023-09-19 RX ORDER — ONDANSETRON 2 MG/ML
4 INJECTION INTRAMUSCULAR; INTRAVENOUS EVERY 6 HOURS PRN
Status: DISCONTINUED | OUTPATIENT
Start: 2023-09-19 | End: 2023-09-22 | Stop reason: HOSPADM

## 2023-09-19 RX ORDER — LISINOPRIL 5 MG/1
5 TABLET ORAL DAILY
Status: CANCELLED | OUTPATIENT
Start: 2023-09-19

## 2023-09-19 RX ORDER — PANTOPRAZOLE SODIUM 40 MG/10ML
40 INJECTION, POWDER, LYOPHILIZED, FOR SOLUTION INTRAVENOUS
Status: DISCONTINUED | OUTPATIENT
Start: 2023-09-19 | End: 2023-09-20

## 2023-09-19 RX ORDER — KETAMINE HCL IN NACL, ISO-OSM 100MG/10ML
SYRINGE (ML) INJECTION
Status: COMPLETED
Start: 2023-09-19 | End: 2023-09-19

## 2023-09-19 RX ORDER — CEFTRIAXONE 2 G/50ML
2000 INJECTION, SOLUTION INTRAVENOUS EVERY 12 HOURS
Status: CANCELLED | OUTPATIENT
Start: 2023-09-19

## 2023-09-19 RX ORDER — LORAZEPAM 2 MG/ML
2 INJECTION INTRAMUSCULAR
Status: DISCONTINUED | OUTPATIENT
Start: 2023-09-19 | End: 2023-09-19

## 2023-09-19 RX ORDER — POTASSIUM CHLORIDE 14.9 MG/ML
20 INJECTION INTRAVENOUS
Status: COMPLETED | OUTPATIENT
Start: 2023-09-19 | End: 2023-09-20

## 2023-09-19 RX ORDER — MAGNESIUM SULFATE 1 G/100ML
1 INJECTION INTRAVENOUS ONCE
Status: COMPLETED | OUTPATIENT
Start: 2023-09-19 | End: 2023-09-20

## 2023-09-19 RX ORDER — SODIUM CHLORIDE, SODIUM GLUCONATE, SODIUM ACETATE, POTASSIUM CHLORIDE, MAGNESIUM CHLORIDE, SODIUM PHOSPHATE, DIBASIC, AND POTASSIUM PHOSPHATE .53; .5; .37; .037; .03; .012; .00082 G/100ML; G/100ML; G/100ML; G/100ML; G/100ML; G/100ML; G/100ML
125 INJECTION, SOLUTION INTRAVENOUS CONTINUOUS
Status: CANCELLED | OUTPATIENT
Start: 2023-09-19

## 2023-09-19 RX ORDER — HYDRALAZINE HYDROCHLORIDE 20 MG/ML
5 INJECTION INTRAMUSCULAR; INTRAVENOUS EVERY 6 HOURS PRN
Status: DISCONTINUED | OUTPATIENT
Start: 2023-09-19 | End: 2023-09-22 | Stop reason: HOSPADM

## 2023-09-19 RX ORDER — LIDOCAINE HYDROCHLORIDE 10 MG/ML
INJECTION, SOLUTION EPIDURAL; INFILTRATION; INTRACAUDAL; PERINEURAL
Status: DISPENSED
Start: 2023-09-19 | End: 2023-09-19

## 2023-09-19 RX ORDER — ONDANSETRON 2 MG/ML
4 INJECTION INTRAMUSCULAR; INTRAVENOUS EVERY 6 HOURS PRN
Status: CANCELLED | OUTPATIENT
Start: 2023-09-19

## 2023-09-19 RX ORDER — MIDAZOLAM HYDROCHLORIDE 2 MG/2ML
INJECTION, SOLUTION INTRAMUSCULAR; INTRAVENOUS
Status: COMPLETED
Start: 2023-09-19 | End: 2023-09-19

## 2023-09-19 RX ORDER — HYDRALAZINE HYDROCHLORIDE 20 MG/ML
5 INJECTION INTRAMUSCULAR; INTRAVENOUS EVERY 6 HOURS PRN
Status: CANCELLED | OUTPATIENT
Start: 2023-09-19

## 2023-09-19 RX ORDER — DEXMEDETOMIDINE HYDROCHLORIDE 4 UG/ML
.1-.7 INJECTION, SOLUTION INTRAVENOUS
Status: DISCONTINUED | OUTPATIENT
Start: 2023-09-19 | End: 2023-09-20

## 2023-09-19 RX ORDER — SODIUM PHOSPHATE,MONO-DIBASIC 19G-7G/118
500 ENEMA (ML) RECTAL 3 TIMES DAILY
Status: DISCONTINUED | OUTPATIENT
Start: 2023-09-19 | End: 2023-09-19

## 2023-09-19 RX ORDER — CALCIUM GLUCONATE 20 MG/ML
2 INJECTION, SOLUTION INTRAVENOUS ONCE
Status: COMPLETED | OUTPATIENT
Start: 2023-09-19 | End: 2023-09-19

## 2023-09-19 RX ORDER — KETAMINE HYDROCHLORIDE 50 MG/ML
50 INJECTION, SOLUTION, CONCENTRATE INTRAMUSCULAR; INTRAVENOUS ONCE
Status: DISCONTINUED | OUTPATIENT
Start: 2023-09-19 | End: 2023-09-19

## 2023-09-19 RX ORDER — ACETAMINOPHEN 325 MG/1
650 TABLET ORAL EVERY 6 HOURS PRN
Status: CANCELLED | OUTPATIENT
Start: 2023-09-19

## 2023-09-19 RX ORDER — PROPOFOL 10 MG/ML
INJECTION, EMULSION INTRAVENOUS
Status: DISPENSED
Start: 2023-09-19 | End: 2023-09-20

## 2023-09-19 RX ORDER — CALCIUM CARBONATE 500 MG/1
1000 TABLET, CHEWABLE ORAL DAILY PRN
Status: CANCELLED | OUTPATIENT
Start: 2023-09-19

## 2023-09-19 RX ORDER — ACETAMINOPHEN 325 MG/1
650 TABLET ORAL EVERY 6 HOURS PRN
Status: DISCONTINUED | OUTPATIENT
Start: 2023-09-19 | End: 2023-09-22 | Stop reason: HOSPADM

## 2023-09-19 RX ORDER — LORAZEPAM 2 MG/ML
2 INJECTION INTRAMUSCULAR
Status: CANCELLED | OUTPATIENT
Start: 2023-09-19

## 2023-09-19 RX ORDER — LISINOPRIL 5 MG/1
5 TABLET ORAL DAILY
Status: DISCONTINUED | OUTPATIENT
Start: 2023-09-19 | End: 2023-09-19

## 2023-09-19 RX ORDER — SODIUM CHLORIDE, SODIUM GLUCONATE, SODIUM ACETATE, POTASSIUM CHLORIDE, MAGNESIUM CHLORIDE, SODIUM PHOSPHATE, DIBASIC, AND POTASSIUM PHOSPHATE .53; .5; .37; .037; .03; .012; .00082 G/100ML; G/100ML; G/100ML; G/100ML; G/100ML; G/100ML; G/100ML
125 INJECTION, SOLUTION INTRAVENOUS CONTINUOUS
Status: DISCONTINUED | OUTPATIENT
Start: 2023-09-19 | End: 2023-09-21

## 2023-09-19 RX ORDER — LANOLIN ALCOHOL/MO/W.PET/CERES
1 CREAM (GRAM) TOPICAL 3 TIMES DAILY
Status: CANCELLED | OUTPATIENT
Start: 2023-09-19

## 2023-09-19 RX ORDER — PANTOPRAZOLE SODIUM 40 MG/10ML
40 INJECTION, POWDER, LYOPHILIZED, FOR SOLUTION INTRAVENOUS
Status: CANCELLED | OUTPATIENT
Start: 2023-09-19

## 2023-09-19 RX ORDER — HEPARIN SODIUM 5000 [USP'U]/ML
5000 INJECTION, SOLUTION INTRAVENOUS; SUBCUTANEOUS EVERY 8 HOURS SCHEDULED
Status: DISCONTINUED | OUTPATIENT
Start: 2023-09-19 | End: 2023-09-22 | Stop reason: HOSPADM

## 2023-09-19 RX ADMIN — SODIUM CHLORIDE, SODIUM GLUCONATE, SODIUM ACETATE, POTASSIUM CHLORIDE, MAGNESIUM CHLORIDE, SODIUM PHOSPHATE, DIBASIC, AND POTASSIUM PHOSPHATE 125 ML/HR: .53; .5; .37; .037; .03; .012; .00082 INJECTION, SOLUTION INTRAVENOUS at 04:48

## 2023-09-19 RX ADMIN — MIDAZOLAM 2 MG: 1 INJECTION INTRAMUSCULAR; INTRAVENOUS at 05:29

## 2023-09-19 RX ADMIN — DEXMEDETOMIDINE HYDROCHLORIDE 0.3 MCG/KG/HR: 4 INJECTION, SOLUTION INTRAVENOUS at 12:04

## 2023-09-19 RX ADMIN — INSULIN LISPRO 1 UNITS: 100 INJECTION, SOLUTION INTRAVENOUS; SUBCUTANEOUS at 12:02

## 2023-09-19 RX ADMIN — POTASSIUM CHLORIDE 20 MEQ: 14.9 INJECTION, SOLUTION INTRAVENOUS at 08:27

## 2023-09-19 RX ADMIN — Medication 50 MG: at 05:09

## 2023-09-19 RX ADMIN — LORAZEPAM 2 MG: 2 INJECTION INTRAMUSCULAR; INTRAVENOUS at 02:15

## 2023-09-19 RX ADMIN — DEXMEDETOMIDINE HYDROCHLORIDE 0.2 MCG/KG/HR: 4 INJECTION, SOLUTION INTRAVENOUS at 05:00

## 2023-09-19 RX ADMIN — SODIUM CHLORIDE, SODIUM GLUCONATE, SODIUM ACETATE, POTASSIUM CHLORIDE, MAGNESIUM CHLORIDE, SODIUM PHOSPHATE, DIBASIC, AND POTASSIUM PHOSPHATE 125 ML/HR: .53; .5; .37; .037; .03; .012; .00082 INJECTION, SOLUTION INTRAVENOUS at 03:35

## 2023-09-19 RX ADMIN — MIDAZOLAM HYDROCHLORIDE 2 MG: 2 INJECTION, SOLUTION INTRAMUSCULAR; INTRAVENOUS at 05:29

## 2023-09-19 RX ADMIN — THIAMINE HYDROCHLORIDE 500 MG: 100 INJECTION, SOLUTION INTRAMUSCULAR; INTRAVENOUS at 12:55

## 2023-09-19 RX ADMIN — ACYCLOVIR SODIUM 900 MG: 50 INJECTION, SOLUTION INTRAVENOUS at 08:05

## 2023-09-19 RX ADMIN — THIAMINE HYDROCHLORIDE 500 MG: 100 INJECTION, SOLUTION INTRAMUSCULAR; INTRAVENOUS at 00:59

## 2023-09-19 RX ADMIN — PHENOBARBITAL SODIUM 650 MG: 65 INJECTION INTRAMUSCULAR at 09:32

## 2023-09-19 RX ADMIN — INSULIN LISPRO 1 UNITS: 100 INJECTION, SOLUTION INTRAVENOUS; SUBCUTANEOUS at 17:11

## 2023-09-19 RX ADMIN — PHENOBARBITAL SODIUM 260 MG: 65 INJECTION INTRAMUSCULAR at 05:22

## 2023-09-19 RX ADMIN — VANCOMYCIN HYDROCHLORIDE 1500 MG: 10 INJECTION, POWDER, LYOPHILIZED, FOR SOLUTION INTRAVENOUS at 10:35

## 2023-09-19 RX ADMIN — CALCIUM GLUCONATE 2 G: 20 INJECTION, SOLUTION INTRAVENOUS at 09:57

## 2023-09-19 RX ADMIN — POTASSIUM CHLORIDE 20 MEQ: 14.9 INJECTION, SOLUTION INTRAVENOUS at 10:35

## 2023-09-19 RX ADMIN — PANTOPRAZOLE SODIUM 40 MG: 40 INJECTION, POWDER, FOR SOLUTION INTRAVENOUS at 06:01

## 2023-09-19 RX ADMIN — THIAMINE HYDROCHLORIDE 500 MG: 100 INJECTION, SOLUTION INTRAMUSCULAR; INTRAVENOUS at 06:34

## 2023-09-19 RX ADMIN — LEVETIRACETAM 1500 MG: 500 INJECTION, SOLUTION, CONCENTRATE INTRAVENOUS at 02:37

## 2023-09-19 RX ADMIN — HEPARIN SODIUM 5000 UNITS: 5000 INJECTION INTRAVENOUS; SUBCUTANEOUS at 21:38

## 2023-09-19 RX ADMIN — MAGNESIUM SULFATE HEPTAHYDRATE 1 G: 1 INJECTION, SOLUTION INTRAVENOUS at 08:53

## 2023-09-19 RX ADMIN — INSULIN LISPRO 1 UNITS: 100 INJECTION, SOLUTION INTRAVENOUS; SUBCUTANEOUS at 08:12

## 2023-09-19 RX ADMIN — CEFTRIAXONE SODIUM 2000 MG: 10 INJECTION, POWDER, FOR SOLUTION INTRAVENOUS at 12:55

## 2023-09-19 RX ADMIN — ACYCLOVIR SODIUM 850 MG: 50 INJECTION, SOLUTION INTRAVENOUS at 13:30

## 2023-09-19 RX ADMIN — LEVETIRACETAM 1500 MG: 500 INJECTION, SOLUTION, CONCENTRATE INTRAVENOUS at 03:18

## 2023-09-19 NOTE — ASSESSMENT & PLAN NOTE
· Seizure grand mal received ativan 1 mg IV after seizure stopped   · keppra loaded with 3000 mg IV x1

## 2023-09-19 NOTE — ASSESSMENT & PLAN NOTE
Presents to ED with nausea vomiting and diarrhea, for last 3 days. Patient has been having crampy abdominal pain as well with these episodes. Has been having poor oral intake secondary to the nausea and vomiting. Has not been able to eat or drink much. Has not been taking his medications either due to pain. Sodium on admission 130, corrected sodium: 135 - will control sugars better.     Urine sodium, urine creatinine, urine osmo, and serum osmo ordered  TSH ordered   Start on IVF    Lab Results   Component Value Date    SODIUM 134 (L) 09/18/2023    SODIUM 130 (L) 09/18/2023

## 2023-09-19 NOTE — CASE MANAGEMENT
Case Management Assessment & Discharge Planning Note    Patient name Marilee Miller  Location ICU 05/ICU 05 MRN [id number]  : 1980 Date 2023       Current Admission Date: 2023  Current Admission Diagnosis:Seizure-like activity [facility])  Patient Active Problem List    Diagnosis Date Noted   • Seizure-like activity ([address]) 2023   • Acute encephalopathy 2023   • Hypertension    • Diabetes mellitus ([address])    • Nausea vomiting and diarrhea    • Hyponatremia    • Sepsis ([address])    • Generalized weakness    • Left elbow pain 2022   • Olecranon bursitis of left elbow 2022      LOS (days): 0  Geometric Mean LOS (GMLOS) (days):   Days to GMLOS:     OBJECTIVE:    Risk of Unplanned Readmission Score: 15.06         Current admission status: Inpatient       Preferred Pharmacy:   [address] [place], [place] [id number]  Phone: [phone] Fax: [phone]    Primary Care Provider: Garrett Mccurdy MD    Primary Insurance: COMMERCIAL MISCELLANEOUS  Secondary Insurance:     ASSESSMENT:  Brownrt Proxies    There are no active Health Care Proxies on file. Readmission Root Cause  30 Day Readmission: No    Patient Information  Admitted from[de-identified] Home  Mental Status: Confused  During Assessment patient was accompanied by: Parent  Assessment information provided by[de-identified] Parent  Primary Caregiver: Self  Support Systems: Family members  [place] of Residence: [address] [place] [address] do you live in?: [facility] entry access options.  Select all that apply.: Stairs  Number of steps to enter home.: 3  Type of Current Residence: [address] [place] home  In the last 12 months, was there a time when you were not able to pay the mortgage or rent on time?: No  In the last 12 months, was there a time when you did not have a steady place to sleep or slept in a shelter (including now)?: No  Homeless/housing insecurity resource given?: N/A  Living Arrangements: Lives w/ Daughter  Is patient a ?: No    Activities of Daily Living Prior to Admission  Functional Status: Independent  Completes ADLs independently?: Yes  Ambulates independently?: Yes  Does patient use assisted devices?: No  Does patient currently own DME?: No  Does patient have a history of Outpatient Therapy (PT/OT)?: Yes (remote history)  Does the patient have a history of Short-Term Rehab?: No  Does patient have a history of HHC?: No  Does patient currently have [address]?: No         Patient Information Continued  Income Source: Unemployed  Does patient have prescription coverage?:  (unknown)  Within the past 12 months, you worried that your food would run out before you got the money to buy more.: Never true  Within the past 12 months, the food you bought just didn't last and you didn't have money to get more.: Never true  Food insecurity resource given?: N/A  Does patient receive dialysis treatments?: No  Does patient have a history of substance abuse?: Yes  Historical substance use preference: Alcohol/ETOH (as per family drinks 5-6 per day)  Is patient currently in treatment for substance abuse?: No. Treatment options provided  Does patient have a history of Mental Health Diagnosis?: No         Means of Transportation  Means of Transport to Appts[de-identified] Drives Self  In the past 12 months, has lack of transportation kept you from medical appointments or from getting medications?: No  In the past 12 months, has lack of transportation kept you from meetings, work, or from getting things needed for daily living?: No  Was application for public transport provided?: N/A        DISCHARGE DETAILS:    Discharge planning discussed with[de-identified] patient's mother Doreen Sheets of Choice: Yes     CM contacted family/caregiver?: Yes  Were Treatment Team discharge recommendations reviewed with patient/caregiver?: Yes  Did patient/caregiver verbalize understanding of patient care needs?: Yes  Were patient/caregiver advised of the risks associated with not following Treatment Team discharge recommendations?: Yes    Contacts  Patient Contacts: Ora Armendariz, mother  Relationship to Patient[de-identified] Family  Contact Method: Phone  Phone Number: [phone]  Reason/Outcome: Continuity of Care, Emergency Contact, Discharge Planning              Other Referral/Resources/Interventions Provided:  Interventions: Substance Abuse Treatment         Treatment Team Recommendation: Substance Abuse Treatment  Discharge Destination Plan[de-identified] Substance Abuse Treatment  Transport at Discharge : Family                                      Additional Comments: Introduced self and role to patient's mother Ghulam Jin as patient is not oriented at this time. Patient independent at baseline, has custody of 2 children ages 6 and 6 from M-F each week. Does use alcohol, family believes patient could use substance abuse treatment. Discussed HOST and that services will be offered to patient when his mental status improves. CM will continue to follow.

## 2023-09-19 NOTE — PROGRESS NOTES
This is an attestation to Resident note    39 yo M transferred to [facility] neurocritical care for 2 witnessed seizures. He was brought to [address] [place] [address] ED by EMS for several days of N/V and generalized weakness with hallucinations at home. Met criteria for sepsis felt to be GI source in nature did have MRCP showing biliary sludge Shortly after being admitted to floor he had rapid response for seizure - was given 1.5 gram keppra load. Given neurologic symptoms he was stated on acyclovir, vancomycin and ceftriaxone. He had additional episode of seizure was given additional 3 gram keppra load and was transferred to [facility]. Upon arrival patient was hallucinating, does have +drinking history. Was given multiple doses of ativan and was also given ketamine and phenobarbital to help facilitate LP, however his tremors were inadequately controlled. Was given additional 260 mg of phenobarbital and given additioanl 650 mg of phenobarbital.     Physical exam:  Awakes to voice opens eyes not oriented. Thought [place] was in the city. Slight gaze deviation left eye downward otherwise EOMI, strength intact. SILT B/L. NSR 80s. No respiratory distress on RA. Ab soft. Extremities warm and well perfused. Impression:  Seizures x 2 - ddx alcohol withdraw given drinking history he reports 3-4 beers several times a week, mother reports daily alcohol use. Viral/bacterial meningitis. Electrolytes reviewed Mg was 1.4 but has since been repleted now 1.9  Encephalopathy/hallucinations   Alcohol use  N/V diarrhea  At [facility] criteria for sepsis met   DM-2  HTN  Hyponatremia 130 on presentation now 137    Plan:  Loaded with phenobarbital 260 and 650, was given several doses of ativan. Hold further dosing of ativan, unless has a seizure. Can use haldol for agitation.  Can give additional phenobarbital for elevated CIWA  Precedex gtt, attempt to wean off today but can maintain for now to facilitate LP  Thiamine and folate  Can continue keppra ppx for now  Maintain on broad spectrum acyclovir, rocephin and vancomycin until LP resulted or encephalopathy resolved if unable to obtain LP  PRN HTN meds  NPO  PPI  IVF  Hold DVT ppx until LP obtain can start 2-4 hours after LP  Follow up culture data and stool studies     Afternoon update:  LP obtained without additional sedation sent for infectious workup - acyclovir stopped HSV negative   Off of precedex   Out of restrains calm and cooperative  Dispo: SD-2    Critical care time 35 minutes excluding teaching, procedures and family discussion

## 2023-09-19 NOTE — NURSING NOTE
[facility] here, transported to [place] [facility] unit. Report called to unit and given to [facility].

## 2023-09-19 NOTE — PHYSICAL THERAPY NOTE
Physical Therapy Evaluation     Patient's Name: Aroldo Long    Admitting Diagnosis  Sepsis [facility]) [A41.9]    Problem List  Patient Active Problem List   Diagnosis    Left elbow pain    Olecranon bursitis of left elbow    Hypertension    Diabetes mellitus ([address])    Nausea vomiting and diarrhea    Hyponatremia    Sepsis ([address])    Generalized weakness    Acute encephalopathy    Seizure [facility])       Past Medical History  Past Medical History:   Diagnosis Date    Allergic     Diabetes mellitus ([address])     Gout     Hypertension     Psoriasis     Sinus infection        Past Surgical History  Past Surgical History:   Procedure Laterality Date    DENTAL SURGERY      KNEE SURGERY Left     NOSE SURGERY      WRIST FRACTURE SURGERY          09/19/23 1033   PT Last Visit   PT Visit Date 09/19/23   Note Type   Note type Evaluation   Pain Assessment   Pain Assessment Tool FLACC   Pain Score No Pain   Restrictions/Precautions   Weight Bearing Precautions Per Order No   Other Precautions Pain; Fall Risk;Telemetry; Chair Alarm; Bed Alarm;Cognitive;Multiple lines;WBS   Home Living   Type of Home House   Home Layout Two level  (0 [address])   Additional Comments Pt is confused. Per mother lives in 2 story house, lives alone and has 2 daughters part time   Prior Function   Level of [place] Independent with functional mobility   Lives With Alone;Daughter   Receives Help From [facility] in the last 6 months 1 to 4   Vocational Unemployed  (recently lost job)   General   Family/Caregiver Present Yes   Cognition   Orientation Level Oriented to person;Disoriented to place; Disoriented to situation;Disoriented to time   Subjective   Subjective Pt agreeable to PT session   RLE Assessment   RLE Assessment WFL   LLE Assessment   LLE Assessment WFL   Coordination   Movements are Fluid and Coordinated 0   Coordination and Movement Description slow and guarded with severe fatigue   Bed Mobility   Supine to Sit 3  Moderate assistance   Additional items Assist x 1;HOB elevated   Sit to Supine Unable to assess   Transfers   Sit to Stand 3  Moderate assistance   Additional items Assist x 2; Increased time required   Stand to Sit 3  Moderate assistance   Additional items Assist x 2   Ambulation/Elevation   Gait pattern Excessively slow; Short stride; Shuffling;Decreased foot clearance   Gait Assistance 3  Moderate assist   Additional items Assist x 2   Assistive Device Other (Comment)  ([facility])   Distance 3   Balance   Static Sitting Poor +   Dynamic Sitting Poor   Static Standing Poor   Dynamic Standing Poor   Ambulatory Poor -   Endurance Deficit   Endurance Deficit No   Activity Tolerance   Activity Tolerance Patient limited by fatigue;Treatment limited secondary to medical complications (Comment)   Medical Staff Made Aware OT   Nurse Made Aware yes   Assessment   Prognosis Good   Problem List Decreased range of motion;Decreased strength;Decreased endurance; Impaired balance;Decreased mobility; Decreased coordination;Decreased cognition; Impaired judgement;Decreased safety awareness;Pain   Assessment Pt is 38 y.o. male seen for PT evaluation s/p admit to [address] [place] [address] on 9/19/2023 w/ nausea, vomiting, new onset seizure. PT consulted to assess pt's functional mobility and d/c needs. Order placed for PT eval and tx, w/ up w/ A order. Comorbidities affecting pt's physical performance at time of assessment include:  has a past medical history of Allergic, Diabetes mellitus ([address]), Gout, Hypertension, Psoriasis, and Sinus infection. PTA, pt was ambulates unrestricted distances and all terrain and lives in multi-level home. Personal factors affecting pt at time of IE include: inability to ambulate household distances, impulsivity, unable to perform physical activity, limited insight into impairments, inability to perform IADLs and inability to perform ADLs.  Please find objective findings from PT assessment regarding body systems outlined above with impairments and limitations including weakness, decreased ROM, impaired balance, decreased endurance, impaired coordination, gait deviations, pain, decreased activity tolerance, decreased functional mobility tolerance, decreased safety awareness, impaired judgement and fall risk. The following objective measures performed on IE also reveal limitations: The patient's AM-PAC Basic Mobility Inpatient Short Form Raw Score is 15, Standardized Score is 36.97. A standardized score less than 42.9 suggests the patient may benefit from discharge to post-acute rehabilitation services. Please also refer to the recommendation of the Physical Therapist for safe discharge planning. Pt's clinical presentation is currently unstable/unpredictable seen in pt's presentation of critical care monitoring. Pt to benefit from continued PT tx to address deficits as defined above and maximize level of functional independent mobility and consistency. From PT/mobility standpoint, recommendation at time of d/c would be post acute rehabilitation services pending progress in order to facilitate return to PLOF. Barriers to Discharge Inaccessible home environment;Decreased caregiver support   Goals   Patient Goals None stated with confusion and fatigue   STG Expiration Date 10/01/23   Short Term Goal #1 1. Complete bed mobility and transfers I to decrease need for caregiver in home. 2. Ambulate 300' I to complete household and community mobility without A. 3. Improve dynamic balance to good to decrease need for UE support during ambulation. 4. Be educated & demonstate 12 steps to be able to enter home without A. Plan   Treatment/Interventions OT; Spoke to case management;Spoke to nursing;Gait training;Bed mobility; Patient/family training; Endurance training;LE strengthening/ROM; Functional transfer training   PT Frequency 3-5x/wk   Recommendation   PT Discharge Recommendation Post acute rehabilitation services   Equipment Recommended   (TBD)   AM-PAC Basic Mobility Inpatient   Turning in Flat Bed Without Bedrails 3   Lying on Back to Sitting on Edge of Flat Bed Without Bedrails 3   Moving Bed to Chair 3   Standing Up From Chair Using Arms 2   Walk in Room 2   Climb 3-5 Stairs With Railing 2   Basic Mobility Inpatient Raw Score 15   Basic Mobility Standardized Score 36.97   Highest Level Of Mobility   -M Goal 4: Move to chair/commode   JH-HLM Achieved 4: Move to chair/commode           Miguel Ronquillo, PT

## 2023-09-19 NOTE — UTILIZATION REVIEW
Initial Clinical Review    Admission: Date/Time/Statement:   Admission Orders (From admission, onward)     Ordered        09/18/23 1632  INPATIENT ADMISSION  Once                      Orders Placed This Encounter   Procedures   • INPATIENT ADMISSION     Standing Status:   Standing     Number of Occurrences:   1     Order Specific Question:   Level of Care     Answer:   Med Surg [16]     Order Specific Question:   Estimated length of stay     Answer:   More than 2 Midnights     Order Specific Question:   Certification     Answer:   I certify that inpatient services are medically necessary for this patient for a duration of greater than two midnights. See H&P and MD Progress Notes for additional information about the patient's course of treatment. ED Arrival Information     Expected   -    Arrival   9/18/2023 12:38    Acuity   Urgent            Means of arrival   Walk-In    Escorted by   Family Member    Service   Hospitalist    Admission type   Emergency            Arrival complaint   hallucinations, nausea, vomiting, high bp, weakness           Chief Complaint   Patient presents with   • Weakness - Generalized     Nausea and weakness since Saturday. Complains of worsening weakness, decreased intake and unsteady gait and hallucinations       Initial Presentation: 38 y.o. male to ED via walk-in from [facility]  Present to ED with weakness, nausea vomiting and diarrhea for several days associated with confusion, patient reportedly hallucinating at home. Febrile and chills at home.    PMHX hypertension, diabetes   Admitted to Level 2 Stepdown  with DX: Sepsis  on exam: very odd neurological symptoms including shakiness, tremors, hallucinations; leukocytosis, elevated alk phos and bili, right upper quadrant ultrasound showing large amount of echogenic material nearly filling the gallbladder but without evidence of acute cholecystitis,    seen by surgery who recommended MRCP - MRCP has also been completed and shows dependent sludge within the gallbladder. Addendum: Shortly after completing documentation, rapid response was called and patient had seizure-like activity and acute worsening confusion. The patient does have a rash on the back of his legs bilaterally, however he states has been present for months, and his mother confirms - concerning for VZV. Plan: iv abx - vancomycin and ceftriaxone, including acyclovir. Start iVF; Monitor labs; f/u blood cx; NPO; f/u stool samples; surgery consulted; f/u Urine sodium, urine creatinine, urine osmo, and serum osmo ordered; f/u CT head    Date: 9/19/23   Day 2  TRANSFERRED TO [address] [place] [address]    Discharge Summary  Summary of Hospital Course:   After admission to the hospital and a CT of the abdomen pelvis which showed sludge in the gallbladder, surgery was consulted and an MRCP was performed. MRCP was negative for acute cholecystitis. Rapid response was called for seizure-like activity patient was transferred to the ICU. Patient appears appropriate concern for meningitis versus encephalitis started on vancomycin and ceftriaxone and acyclovir. During the night patient had a [place] seizure with Ativan 1 mg after the seizure was completed. Keppra loaded with 3 g of IV Keppra. Transferred to [facility] neuro ICU.     ED Triage Vitals   Temperature Pulse Respirations Blood Pressure SpO2   09/18/23 1249 09/18/23 1249 09/18/23 1249 09/18/23 1249 09/18/23 1249   98.7 °F (37.1 °C) (!) 109 20 (!) 182/119 96 %      Temp Source Heart Rate Source Patient Position - Orthostatic VS BP Location FiO2 (%)   09/18/23 1249 09/18/23 1249 09/18/23 1445 09/18/23 1249 --   Temporal Monitor Lying Left arm       Pain Score       09/18/23 1807       No Pain          Wt Readings from Last 1 Encounters:   09/19/23 97.4 kg (214 lb 11.7 oz)     Additional Vital Signs:   Date/Time Temp Pulse Resp BP MAP (mmHg) SpO2 Calculated FIO2 (%) - Nasal Cannula Nasal Cannula O2 Flow Rate (L/min) O2 Device Patient Position - Orthostatic VS   09/19/23 0330 -- 104 31 Abnormal  118/64 87 93 % 28 2 L/min Nasal cannula Lying   09/19/23 0300 99.9 °F (37.7 °C) 101 23 Abnormal  121/82 98 93 % 28 2 L/min Nasal cannula --   09/19/23 0245 -- 107 Abnormal  30 Abnormal  130/92 105 93 % -- -- Nasal cannula Lying   09/19/23 0230 99.4 °F (37.4 °C) 113 Abnormal  28 Abnormal  129/89 103 90 % 28 2 L/min Nasal cannula Lying   09/19/23 0220 -- 119 Abnormal  29 Abnormal  168/98 124 90 %  -- -- None (Room air) Lying   SpO2: during seizure activity at 09/19/23 0220   09/19/23 0200 -- 92 25 Abnormal  123/89 103 93 % -- -- -- --   09/19/23 0145 -- 93 27 Abnormal  139/85 122 94 % -- -- -- --   09/18/23 22:33:01 -- -- -- -- -- 98 % -- -- -- --   09/18/23 2230 99.4 °F (37.4 °C) 103 19 151/88 112 94 % -- -- None (Room air) --   09/18/23 2130 -- 103 26 Abnormal  155/98 122 92 % -- -- -- Lying   09/18/23 2115 -- 100 21 161/103 Abnormal  127 91 % -- -- None (Room air) Lying   09/18/23 2100 97.6 °F (36.4 °C) 109 Abnormal  -- -- -- 92 % -- -- None (Room air) --   09/18/23 20:30:07 -- 104 -- -- -- 90 % -- -- -- --   09/18/23 20:27:07 -- 106 Abnormal  -- -- -- 89 % Abnormal  -- -- -- --   09/18/23 20:24:07 -- 106 Abnormal  -- -- -- 89 % Abnormal  -- -- -- --   09/18/23 20:21:07 -- 109 Abnormal  -- -- -- 91 % -- -- -- --   09/18/23 20:18:07 -- 111 Abnormal  -- -- -- 90 % -- -- -- --   09/18/23 20:15:07 -- 107 Abnormal  -- -- -- 91 % -- -- -- --   09/18/23 20:12:07 -- 109 Abnormal  -- -- -- 91 % -- -- -- --   09/18/23 20:09:07 -- 112 Abnormal  -- -- -- 92 % -- -- -- --   09/18/23 20:06:07 -- 116 Abnormal  -- -- -- 93 % -- -- -- --   09/18/23 20:03:07 -- 109 Abnormal  -- -- -- 94 % -- -- -- --   09/18/23 20:00:07 -- 109 Abnormal  -- -- -- 95 % -- -- -- --   09/18/23 19:57:07 -- 107 Abnormal  -- -- -- 95 % -- -- -- --   09/18/23 19:54:07 -- 110 Abnormal  -- -- -- 96 % -- -- -- --   09/18/23 19:53:33 97.3 °F (36.3 °C) Abnormal  -- -- -- -- -- -- -- -- --   23 19:52:36 98.2 °F (36.8 °C) 82 22 148/62 -- 99 % -- -- None (Room air) Lying   23 18:01:07 99 °F (37.2 °C) 94 19 163/109 Abnormal  127 95 % -- -- -- --   23 1600 -- 102 -- 148/93 115 94 % -- -- -- --   23 1545 -- 103 -- 135/81 103 93 % -- -- -- --   23 1530 -- 100 -- 131/75 97 93 % -- -- -- --   23 1445 -- 104 18 153/83 107 94 % -- -- None (Room air) Lying   23 1430 -- 104 -- 136/87 107 94 % -- -- -- --   23 1330 -- 107 Abnormal  -- 146/94 114 94 % -- -- -- --   23 1320 -- -- -- -- -- -- -- -- None (Room air) --   23 1249 98.7 °F (37.1 °C) 109 Abnormal  20 182/119 Abnormal   -- 96 % -- -- None (Room air) --           EK23  Sinus tachycardia with Premature atrial complexes with Aberrant conduction  Otherwise normal ECG  No previous ECGs available    23  Sinus tachycardia with occasional Premature ventricular complexes  Possible Left atrial enlargement  Nonspecific ST abnormality  Abnormal ECG  When compared with ECG of 18-SEP-2023 19:51, (unconfirmed)  Premature ventricular complexes are now Present    23  Sinus tachycardia  Nonspecific ST abnormality  Abnormal ECG  When compared with ECG of 18-SEP-2023 19:52, (unconfirmed)  Premature ventricular complexes are no longer Present    Pertinent Labs/Diagnostic Test Results:   CT head wo contrast   Final Result by Kassi Milton MD ([id number])      No acute intracranial abnormality. Workstation performed: [id number]         MRI abdomen wo contrast and mrcp   Final Result by Heather Waldrop MD ([id number])      Dependent sludge within the gallbladder without suspicious focal lesion on this noncontrast MRI examination. .      Trace volume perihepatic ascites of indeterminate etiology.                      Workstation performed: [id number]         US right upper quadrant   Final Result by Chris Silverio MD ( [id number]) 1.  Large amount of echogenic material nearly filling the gallbladder lumen presumably representing tumefactive sludge, without findings suspicious for a solid lesion on recent MRI abdomen. No evidence of acute cholecystitis. 2.  Mild hepatomegaly and steatosis. The study was marked in EPIC for significant notification.       Workstation performed: [id number]           Results from last 7 days   Lab Units 09/18/23  1313   SARS-COV-2  Negative     Results from last 7 days   Lab Units 09/19/23 0451 09/18/23 2204 09/18/23  1313   WBC Thousand/uL 15.43* 14.18* 16.47*   HEMOGLOBIN g/dL 13.1 13.2 14.8   HEMATOCRIT % 37.6 38.6 42.7   PLATELETS Thousands/uL 82* 79* 90*   NEUTROS ABS Thousands/µL 12.67* 11.56* 13.92*         Results from last 7 days   Lab Units 09/19/23  0609 09/19/23 0451 09/19/23 0441 09/18/23 2204 09/18/23  1313   SODIUM mmol/L  --   --  137 134* 130*   POTASSIUM mmol/L  --   --  3.7 4.0 4.1   CHLORIDE mmol/L  --   --  102 100 94*   CO2 mmol/L  --   --  24 26 25   ANION GAP mmol/L  --   --  11 8 11   BUN mg/dL  --   --  9 10 14   CREATININE mg/dL  --   --  0.81 0.76 0.85   EGFR ml/min/1.73sq m  --   --  108 110 105   CALCIUM mg/dL  --   --  8.4 8.9 9.6   CALCIUM, IONIZED mmol/L 0.97*  --   --   --   --    MAGNESIUM mg/dL  --  1.9  --  1.4*  --    PHOSPHORUS mg/dL  --  3.2  --  2.2*  --      Results from last 7 days   Lab Units 09/19/23 0441 09/18/23 2204 09/18/23  1313   AST U/L 52* 52* 65*   ALT U/L 27 28 31   ALK PHOS U/L 126* 112* 153*   TOTAL PROTEIN g/dL 8.0 8.0 8.4   ALBUMIN g/dL 3.7 3.8 4.1   TOTAL BILIRUBIN mg/dL 2.07* 2.18* 2.82*   BILIRUBIN DIRECT mg/dL  --   --  1.26*     Results from last 7 days   Lab Units 09/19/23  1137 09/19/23  0822 09/19/23  0216 09/18/23  2106 09/18/23 1952 09/18/23  1246   POC GLUCOSE mg/dl 151* 176* 153* 254* 189* 283*     Results from last 7 days   Lab Units 09/19/23  0441 09/18/23  2204 09/18/23  1313   GLUCOSE RANDOM mg/dL 185* 226* 314*     Results from last 7 days   Lab Units 09/18/23  1313   OSMOLALITY, SERUM mmol/     Results from last 7 days   Lab Units 09/18/23  1313   HEMOGLOBIN A1C % 7.1*   EAG mg/dl 157     BETA-HYDROXYBUTYRATE   Date Value Ref Range Status   09/18/2023 0.4 <0.6 mmol/L Final        Results from last 7 days   Lab Units 09/18/23  1313   PH GEETHA  7.421*   PCO2 GEETHA mm Hg 39.9*   PO2 GEETHA mm Hg 35.9   HCO3 GEETHA mmol/L 25.4   BASE EXC GEETHA mmol/L 0.9   O2 CONTENT GEETHA ml/dL 14.5   O2 HGB, VENOUS % 65.9       Results from last 7 days   Lab Units 09/19/23  0441 09/19/23  0007 09/18/23  2204   HS TNI 0HR ng/L  --   --  66*   HS TNI 2HR ng/L  --  66*  --    HSTNI D2 ng/L  --  0  --    HS TNI 4HR ng/L 72*  --   --    HSTNI D4 ng/L 6  --   --        Results from last 7 days   Lab Units 09/18/23  1313   TSH 3RD GENERATON uIU/mL 3.333     Results from last 7 days   Lab Units 09/19/23  0441 09/18/23  1313   PROCALCITONIN ng/ml 0.15 0.19     Results from last 7 days   Lab Units 09/18/23  2204 09/18/23  1313   LACTIC ACID mmol/L 1.2 1.2     Results from last 7 days   Lab Units 09/18/23  2204   PROLACTIN ng/mL 11.16       Results from last 7 days   Lab Units 09/19/23  0441   HEP B S AG  Non-reactive   HEP C AB  Non-reactive   HEP B C IGM  Non-reactive     Results from last 7 days   Lab Units 09/18/23  1313   LIPASE u/L 52       Results from last 7 days   Lab Units 09/18/23  1313   OSMOLALITY, SERUM mmol/     Results from last 7 days   Lab Units 09/18/23  1320   CLARITY UA  Clear   COLOR UA  Orange   SPEC GRAV UA  1.010   PH UA  6.0   GLUCOSE UA mg/dl 500 (1/2%)*   KETONES UA mg/dl Trace*   BLOOD UA  Negative   PROTEIN UA mg/dl 30 (1+)*   NITRITE UA  Negative   BILIRUBIN UA  Small*   UROBILINOGEN UA E.U./dl 4.0*   LEUKOCYTES UA  Negative   WBC UA /hpf 2-4   RBC UA /hpf None Seen   BACTERIA UA /hpf Occasional   EPITHELIAL CELLS WET PREP /hpf Occasional   SODIUM UR  31   CREATININE UR mg/dL 129.5     Results from last 7 days   Lab Units 09/19/23  0017 09/18/23  1313   STREP PNEUMONIAE ANTIGEN, URINE  Negative  --    LEGIONELLA URINARY ANTIGEN  Negative  --    INFLUENZA A PCR   --  Negative   INFLUENZA B PCR   --  Negative   RSV PCR   --  Negative         Results from last 7 days   Lab Units 09/18/23  1320   AMPH/METH  Negative   BARBITURATE UR  Negative   BENZODIAZEPINE UR  Positive*   COCAINE UR  Negative   METHADONE URINE  Negative   OPIATE UR  Negative   PCP UR  Negative   THC UR  Negative       Results from last 7 days   Lab Units 09/19/23  0608 09/18/23  2018   BLOOD CULTURE  Received in Microbiology Lab. Culture in Progress. Received in Microbiology Lab. Culture in Progress. Received in Microbiology Lab. Culture in Progress. Received in Microbiology Lab. Culture in Progress.        Results from last 7 days   Lab Units 09/19/23  1436   GLUCOSE CSF mg/dL 120*   PROTEIN CSF mg/dL 65*     Results from last 7 days   Lab Units 09/19/23  0441   VANCOMYCIN RM ug/mL 13.2       ED Treatment:   Medication Administration from 09/18/2023 1236 to 09/18/2023 1754       Date/Time Order Dose Route Action     09/18/2023 1318 EDT sodium chloride 0.9 % bolus 1,000 mL 1,000 mL Intravenous New Bag     09/18/2023 1318 EDT ondansetron (ZOFRAN) injection 4 mg 4 mg Intravenous Given          Present on Admission:  • Acute encephalopathy      Admitting Diagnosis: Vomiting [R11.10]  Weakness generalized [R53.1]  Elevated bilirubin [R17]  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 male     Admission Orders: SCDs; NPO;Accuchecks with ssic      Scheduled Medications:  Medication   hydrochlorothiazide (HYDRODIURIL) 25 mg tablet   naproxen (Naprosyn) 500 mg tablet   ampicillin (OMNIPEN) 2,000 mg in sodium chloride 0.9 % 100 mL IVPB   vancomycin (VANCOCIN) 1500 mg in sodium chloride 0.9% 250 mL IVPB   saccharomyces boulardii (FLORASTOR) capsule 250 mg   metroNIDAZOLE (FLAGYL) IVPB (premix) 500 mg 100 mL   cefTRIAXone (ROCEPHIN) IVPB (premix in dextrose) 1,000 mg 50 mL   enoxaparin (LOVENOX) subcutaneous injection 40 mg   vancomycin (VANCOCIN) 1500 mg in sodium chloride 0.9% 250 mL IVPB   LORazepam (ATIVAN) injection 2 mg   levETIRAcetam (KEPPRA) 1,500 mg in sodium chloride 0.9 % 451 mL IVPB   folic acid 1 mg, thiamine (VITAMIN B1) 100 mg in sodium chloride 0.9 % 100 mL IV piggyback   LORazepam (ATIVAN) injection 2 mg (9/18 rec'd x1)    vancomycin (VANCOCIN) 1500 mg in sodium chloride 0.9% 250 mL IVPB   enoxaparin (LOVENOX) subcutaneous injection 40 mg   cefTRIAXone (ROCEPHIN) IVPB (premix in dextrose) 2,000 mg 50 mL   acyclovir (ZOVIRAX) 900 mg in sodium chloride 0.9 % 250 mL IVPB   insulin lispro (HumaLOG) 100 units/mL subcutaneous injection 1-6 Units   insulin lispro (HumaLOG) 100 units/mL subcutaneous injection 1-6 Units   pantoprazole (PROTONIX) injection 40 mg   calcium carbonate (TUMS) chewable tablet 1,000 mg   simethicone (MYLICON) chewable tablet 80 mg   ondansetron (ZOFRAN) injection 4 mg   acetaminophen (TYLENOL) tablet 650 mg   multi-electrolyte (PLASMALYTE-A/ISOLYTE-S PH 7.4) IV solution   hydrALAZINE (APRESOLINE) injection 5 mg   multivitamin-minerals (CENTRUM) tablet 1 tablet   Cinnamon TABS 500 mg   lisinopril (ZESTRIL) tablet 5 mg   glucosamine-chondroitin 500-400 MG tablet 1 tablet           Network Utilization Review Department  ATTENTION: Please call with any questions or concerns to [phone] and carefully listen to the prompts so that you are directed to the right person. All voicemails are confidential.  Mae Going all requests for admission clinical reviews, approved or denied determinations and any other requests to dedicated fax number below belonging to the campus where the patient is receiving treatment.  List of dedicated fax numbers for the Facilities:  [place] DENIALS (Administrative/Medical Necessity) [phone] 230[address] [facility] (Maternity/NICU/Pediatrics) [address] [place] [address] [address] [phone]   [address] [facility] [address] [address] [facility] [phone]   [address] [address] [phone]

## 2023-09-19 NOTE — PLAN OF CARE
Problem: PHYSICAL THERAPY ADULT  Goal: Performs mobility at highest level of function for planned discharge setting. See evaluation for individualized goals. Description: Treatment/Interventions: OT, Spoke to case management, Spoke to nursing, Gait training, Bed mobility, Patient/family training, Endurance training, LE strengthening/ROM, Functional transfer training  Equipment Recommended:  (TBD)       See flowsheet documentation for full assessment, interventions and recommendations. Note: Prognosis: Good  Problem List: Decreased range of motion, Decreased strength, Decreased endurance, Impaired balance, Decreased mobility, Decreased coordination, Decreased cognition, Impaired judgement, Decreased safety awareness, Pain  Assessment: Pt is 38 y.o. male seen for PT evaluation s/p admit to [facility] on 9/19/2023 w/ nausea, vomiting, new onset seizure. PT consulted to assess pt's functional mobility and d/c needs. Order placed for PT eval and tx, w/ up w/ A order. Comorbidities affecting pt's physical performance at time of assessment include:  has a past medical history of Allergic, Diabetes mellitus ([address]), Gout, Hypertension, Psoriasis, and Sinus infection. PTA, pt was ambulates unrestricted distances and all terrain and lives in multi-level home. Personal factors affecting pt at time of IE include: inability to ambulate household distances, impulsivity, unable to perform physical activity, limited insight into impairments, inability to perform IADLs and inability to perform ADLs. Please find objective findings from PT assessment regarding body systems outlined above with impairments and limitations including weakness, decreased ROM, impaired balance, decreased endurance, impaired coordination, gait deviations, pain, decreased activity tolerance, decreased functional mobility tolerance, decreased safety awareness, impaired judgement and fall risk.  The following objective measures performed on IE also reveal limitations: The patient's AM-PAC Basic Mobility Inpatient Short Form Raw Score is 15, Standardized Score is 36.97. A standardized score less than 42.9 suggests the patient may benefit from discharge to post-acute rehabilitation services. Please also refer to the recommendation of the Physical Therapist for safe discharge planning. Pt's clinical presentation is currently unstable/unpredictable seen in pt's presentation of critical care monitoring. Pt to benefit from continued PT tx to address deficits as defined above and maximize level of functional independent mobility and consistency. From PT/mobility standpoint, recommendation at time of d/c would be post acute rehabilitation services pending progress in order to facilitate return to PLOF. Barriers to Discharge: Inaccessible home environment, Decreased caregiver support     PT Discharge Recommendation: Post acute rehabilitation services    See flowsheet documentation for full assessment.

## 2023-09-19 NOTE — DISCHARGE SUMMARY
[address] [facility]  Discharge- Tatum Velázquez 1980, 38 y.o. male MRN: [id number]  Unit/Bed#: -01 Encounter: [id number]  Primary Care Provider: Claudia Fan MD   Date and time admitted to hospital: 9/18/2023 12:50 PM    Seizure [facility])  Assessment & Plan  · Seizure grand mal received ativan 1 mg IV after seizure stopped   · keppra loaded with 3000 mg IV x1      Acute encephalopathy  Assessment & Plan  Patient is with acute onset of confusion significant memory problem, conversation with the patient he did not recall that his mother was here today he is redirectable but he is anxious about his memory losses. This seems to be happening over the last month he stated he had 1 episode where he called the police because he said somebody cut his trees down at his house but that never happened. Per him that happened 2 weeks, unclear at this time if his time perception is intact. I have a call out to his mother to discuss condition. · Started patient on acyclovir vancomycin and ceftriaxone for encephalitis coverage  · MRI of the brain tomorrow  · CT of the head performed today  · Lumbar puncture  · Consider EEG may need 24-hour EEG -he did have a seizure like activity and a postictal state possible. Generalized weakness  Assessment & Plan  · Patient complains of feeling weak and some shaking in his legs, feels funny to ambulate and feels like he is weak not specific on one side or the other  · Stated at home he saw his mother and she was in both auditory and visual hallucination   · Been feeling unwell for the last 2 years he said but it was corrected because he is unsure of the timing last couple days  · He lives by himself but has family members like his mother visiting him     Hyponatremia  Assessment & Plan  Presents to ED with nausea vomiting and diarrhea, for last 3 days. Patient has been having crampy abdominal pain as well with these episodes.   Has been having poor oral intake secondary to the nausea and vomiting. Has not been able to eat or drink much. Has not been taking his medications either due to pain. Sodium on admission 130, corrected sodium: 135 - will control sugars better. Urine sodium, urine creatinine, urine osmo, and serum osmo ordered  TSH ordered   Start on IVF    Lab Results   Component Value Date    SODIUM 134 (L) 09/18/2023    SODIUM 130 (L) 09/18/2023       Nausea vomiting and diarrhea  Assessment & Plan  Patient came with complaints of nausea vomiting and diarrhea since Saturday and decreased oral intake and weakness not take his medications since then, chart denied ingestion of fish" for 3 or more X no recent well water consumption, no recent travel or swimming in lakes  ·   · Of the gallbladder was performed-showed large amount of echogenic material sludgelike material no evidence of acute cholecystitis  · MRCP was negative for acute cholecystitis  · Lipase is normal  · Keep n.p.o. for now  · Total samples are pending  · Surgery is following    Diabetes mellitus [facility])  Assessment & Plan  Lab Results   Component Value Date    HGBA1C 12.3 (H) 08/19/2021       Recent Labs     09/18/23  1246 09/18/23  1952 09/18/23  2106   POCGLU 283* 189* 254*       Blood Sugar Average: Last 72 hrs:  (P) 242     Insulin sliding scale     Hypertension  Assessment & Plan  · Currently taking lisinopril 5 mg qd and HCTZ 25 mg qd  · Did not take BP medications this morning and BP elevated in the /119  · BP subsequently improved in the ED without medication  · Hold home blood pressure medications at this time  · Avoid hypotension    * Sepsis [facility])  Assessment & Plan  Patient presented to the ED with nausea vomiting diarrhea for the last 3 days with generalized weakness patient met SIRS criteria with a leukocytosis of 16.5 and tachycardia with source suspected GI source.   She was evaluated by surgery had an MRCP which was negative for acute cholecystitis is UA is negative blood cultures were sent and are pending and stool samples were ordered lactic was normal.   · Due to patient's confusion and a seizure-like activity concern for meningitis versus encephalitis  · He was started on acyclovir vancomycin and ceftriaxone  · Pt is under 48years of age no recent listeria reported in the area ( hold ampicillin)   · For LP tomorrow  · CT of the head appears negative   · MRI of the head tomorrow  · Check for tickborne illnesses patient is living in a [facility] area,  [place] and [address] [place] [address] Problems     Resolved Problems  Date Reviewed: 6/8/2022   None         Admission Date:   Admission Orders (From admission, onward)     Ordered        09/18/23 1632  INPATIENT ADMISSION  Once                        Admitting Diagnosis: Vomiting [R11.10]  Weakness generalized [R53.1]  Elevated bilirubin [R17]  Gallbladder anomaly [Q44.1]    HPI: Patient is a 68-year-old male with a medical history of hypertension, sciatica he was last seen by an urgent care on 4/20/2023 for sciatica, patient history from the chart is that he had nausea vomiting diarrhea since Saturday with hot and cold sweats lightheadedness and hallucinations. Procedures Performed: No orders of the defined types were placed in this encounter. MRCP   CT abd/pelvis   CTH    Summary of Hospital Course:   After admission to the hospital and a CT of the abdomen pelvis which showed sludge in the gallbladder, surgery was consulted and an MRCP was performed. MRCP was negative for acute cholecystitis. Rapid response was called for seizure-like activity patient was transferred to the ICU. Patient appears appropriate concern for meningitis versus encephalitis started on vancomycin and ceftriaxone and acyclovir. During the night patient had a grand mall seizure with Ativan 1 mg after the seizure was completed. Keppra loaded with 3 g of IV Keppra. Transferred to [address] [place] [address] neuro ICU.         Significant Findings, Care, Treatment and Services Provided:   [facility]   General surgery consult  CT    Complications: seizure     Condition at Discharge: critical         Discharge instructions/Information to patient and family:   See after visit summary for information provided to patient and family. Provisions for Follow-Up Care:  See after visit summary for information related to follow-up care and any pertinent home health orders. PCP: Bay Laureano MD    Disposition: transfer to [facility] Readmission: Yes     Discharge Statement   I spent 30 minutes discharging the patient. This time was spent on the day of discharge. I had direct contact with the patient on the day of discharge. Additional documentation is required if more than 30 minutes were spent on discharge. Discharge Medications:  See after visit summary for reconciled discharge medications provided to patient and family.

## 2023-09-19 NOTE — H&P
[address] [place] [address]  H&P: Critical Care  Name: Aroldo Long 38 y.o. male I MRN: [id number]  Unit/Bed#: ICU 05 I Date of Admission: 9/19/2023   Date of Service: 9/19/2023 I Hospital Day: 0      Assessment/Plan   Neuro:   · Diagnosis: New onset seizure activity most likely in setting of alcohol withdrawal but can not ruleout infectious causes  · Initial lactate 9/19 1.2 normal  · UA showed occasional bacteria  · Lyme, West Nile, Babesia pending  · Plan:  · Given ativan 1mg with resolution  · Loaded with keppra 3g with Keppra 1g BID maintenance  · Continue keppra, ativan prn for seizures  · CTH without acute intracranial abnormality  · Consider vEEG (will need MRI compatible leads)  · Patient given ketamine 50mg and then phenobarbital 260mg for attempt LP but could not be done due to continued agitation and tremors; attempt LP again as patient more calm  · Seizure precautions  · Ativan 2mg PRN for seizures  · MRI ordered, will follow  · Diagnosis: Acute encephalopathy  · Likely secondary to alcohol withdrawal, ruling out meningitis/encephalitis  · Plan:   · Patient on thiamine 500mg IV X3 and then continue thiamine 100mg daily  · Delirium precautions  · CIWA protocol  · CAM ICU  · Neuro checks      CV:   · Diagnosis: Hypertension  · Plan:   · On home lisinopril, currently holding  · Monitor BP, prn hydralazine for sbp>170      Pulm:  No active issues    GI:   · Diagnosis: Gallbladder sludge; Diarrhea- most likely in setting of alcohol withdrawal  · C diff, acute hep panel, stool enteric bacterial panel pending  · Plan:   · S/p MRCP without evidence of acute cholecystitis      :   No active issues    F/E/N:    · Diagnosis: Hyponatremia  Likely hypovolemic secondary to nausea/vomiting on presentation  · Plan:  · Continue multielectrolyte 125mL/hr for now  · Replete Mg>2, Phos>3, Potassium>4  · Continue to trend  · NPO for now, consider diet when stable      Heme/Onc:   · Diagnosis: DVT prophylaxis  · Plan: Hold for now for possible LP      Endo:   · Diagnosis: Type 2 diabetes  · Plan:  · Sliding scale insulin      ID:   · Diagnosis: Sepsis, concern for possible meningitis/encephalitis  · Urine Strep pneumoniae and urine legionella pending  · Blood cultures pending  · Plan:  · Continue acyclovir, vancomycin, and ceftriaxone  · LP, will follow results  · MRI ordered, will follow  · Lymes, west nile sent and pending, will follow  · Consider ID consult      MSK/Skin:   · Diagnosis: Shingles of bilateral back of legs and hx of psoriasis  · Plan:   · Monitor  · Continue home steroid cream when able      Disposition: Critical care       History of Present Illness     HPI: Eliezer Spear is a 38 y.o. male with a significant past medical history of diabetes, htn who presented to [address] [place] [address] ED on 9/18/23 secondary to nausea and vomiting, diarrhea. Patient also having cold and hot sweats, lightheadedness, hallucinations. Patient hallucinated seeing someone cut a tree. Patient poorly compliant on blood sugars. Patient having short term memory loss as he did not remember mother visiting him the previous day; he had this 2 weeks ago as well. Patient having shingles on back of leg. Patient noted to feel unsteady on his feet before admission. He was meeting SIRS criteria and found to have gallbladder sludge. He was admitted and had an MRCP that did not demonstrate acute cholecystitis. During admission, he was a rapid response secondary to seizure like activity where he received ativan 2mg and keppra 1500mg with resolution and then found to be confused. Empiric acyclovir, ceftriaxone and vancomycin was given for possible CNS infection    Patient had another episode today at [address] [place] [address] ICU which seemed like a generalized tonic clonic seizure and wa given ativan 1mg. Keppra 3g was loaded and transferred to [facility] neuro ICU.     There was concern for possible meningitis/encephalitis given his clinical history so he was transferred to [facility] for further evaluation. LP was attempted but could not be done due to patient agitation. Patient noted by family to drink 3-4 beers a day which patient states drinking 3-4 a week. History obtained from chart review. Review of Systems   Unable to perform ROS: Mental status change      Historical Information   Past Medical History:  No date: Allergic  No date: Diabetes mellitus ([address])  No date: Gout  No date: Hypertension  No date: Psoriasis  No date: Sinus infection Past Surgical History:  No date: DENTAL SURGERY  No date: KNEE SURGERY; Left  No date: NOSE SURGERY  No date: WRIST FRACTURE SURGERY   Current Outpatient Medications   Medication Instructions   • allopurinol (ZYLOPRIM) 300 mg, Oral, Daily   • Cinnamon 500 MG TABS 1 tablet, Oral, Daily   • Crisaborole (Eucrisa) 2 % OINT Apply topically   • glipiZIDE (GLUCOTROL) 5 mg, Oral, Daily   • glucosamine-chondroitin 500-400 MG tablet 1 tablet, Oral, 3 times daily   • hydrocortisone 2.5 % cream APPLY TWICE A DAY AS NEEDED FOR PSORIASIS ON FACE.  USE SPARINGLY   • lisinopril (ZESTRIL) 5 mg tablet TAKE 1 TABLET BY MOUTH EVERY DAY FOR ELEVATED BLOOD PRESSURE   • Multiple Vitamins-Minerals (MULTIVITAMIN WITH MINERALS) tablet 1 tablet, Oral, Daily   • nystatin-triamcinolone (MYCOLOG-II) cream Apply externally, 3 times daily   • Potassium 75 mg, Oral, Daily   • saccharomyces boulardii (FLORASTOR) 250 mg, Oral, 2 times daily   • valACYclovir (VALTREX) 1,000 mg, Oral, 3 times daily    Allergies   Allergen Reactions   • Kiwi Extract - Food Allergy Tongue Swelling      Social History     Tobacco Use   • Smoking status: Former     Types: Cigarettes     Quit date:      Years since quittin.7   • Smokeless tobacco: Former     Types: Chew   Vaping Use   • Vaping Use: Never used   Substance Use Topics   • Alcohol use: Yes     Comment: socially    • Drug use: Never    Family History   Problem Relation Age of Onset   • Heart disease Mother    • Diabetes Mother    • Parkinsonism Father    • Heart disease Father           Objective                            Vitals I/O      Most Recent Min/Max in 24hrs   Temp 100.1 °F (37.8 °C) Temp  Min: 97.3 °F (36.3 °C)  Max: 100.1 °F (37.8 °C)   Pulse (!) 108 Pulse  Min: 82  Max: 119   Resp (!) 26 Resp  Min: 18  Max: 31   /86 BP  Min: 118/64  Max: 182/119   O2 Sat 96 % SpO2  Min: 89 %  Max: 99 %      Intake/Output Summary (Last 24 hours) at 9/19/2023 0755  Last data filed at 9/19/2023 0600  Gross per 24 hour   Intake 264.7 ml   Output --   Net 264.7 ml         Diet NPO     Invasive Monitoring Physical exam    Physical Exam  Eyes:      General: No visual field deficit. Extraocular Movements: Extraocular movements intact. Pupils: Pupils are equal, round, and reactive to light. HENT:      Head: Normocephalic and atraumatic. Cardiovascular:      Rate and Rhythm: Normal rate and regular rhythm. Heart sounds: Normal heart sounds. Abdominal:      Palpations: Abdomen is soft. Constitutional:       Appearance: He is well-developed. Pulmonary:      Effort: Pulmonary effort is normal.      Breath sounds: Normal breath sounds. Neurological:      Mental Status: He is disoriented. Cranial Nerves: No cranial nerve deficit, dysarthria or facial asymmetry. Sensory: No sensory deficit. Motor: Strength full and intact in all extremities. Bicep reflexes are 2+ on the right side and 2+ on the left side. Brachioradialis reflexes are 2+ on the right side and 2+ on the left side. Patellar reflexes are 2+ on the right side and 2+ on the left side. Comments: Waxing and waning mental status; Allegedly AAOX4 but then becomes AAOX1, for interviewer, patient oriented to person, month and year but not date or place (says he is in [facility] license of [facility]). Diagnostic Studies    Imaging:  I have personally reviewed pertinent reports.        Medications:  Scheduled PRN        Continuous    dexmedetomidine, 0.1-0.7 mcg/kg/hr, Last Rate: 0.5 mcg/kg/hr (09/19/23 8665)  multi-electrolyte, 125 mL/hr, Last Rate: 125 mL/hr (09/19/23 0448)         Labs:    CBC    Recent Labs     09/18/23 2204 09/19/23 0451   WBC 14.18* 15.43*   HGB 13.2 13.1   HCT 38.6 37.6   PLT 79* 82*     BMP    Recent Labs     09/18/23 2204 09/19/23  0441   SODIUM 134* 137   K 4.0 3.7    102   CO2 26 24   AGAP 8 11   BUN 10 9   CREATININE 0.76 0.81   CALCIUM 8.9 8.4       Coags    No recent results     Additional Electrolytes  Recent Labs     09/18/23 2204 09/19/23 0451 09/19/23  0609   MG 1.4* 1.9  --    PHOS 2.2* 3.2  --    CAIONIZED  --   --  0.97*          Blood Gas    No recent results  Recent Labs     09/18/23  1313   PHVEN 7.421*   QQH7EDL 39.9*   PO2VEN 35.9   [id number] 25.4   BEVEN 0.9    LFTs  Recent Labs     09/18/23 2204 09/19/23  0441   ALT 28 27   AST 52* 52*   ALKPHOS 112* 126*   ALB 3.8 3.7   TBILI 2.18* 2.07*       Infectious  Recent Labs     09/18/23 1313 09/19/23  0441   PROCALCITONI 0.19 0.15     Glucose  Recent Labs     09/18/23  1313 09/18/23 2204 09/19/23  0441   GLUC 314* 226* 185*                 Annella Oppenheim, MD

## 2023-09-19 NOTE — PROGRESS NOTES
Critical Care Interval Transfer Note:    Please refer to progress note from earlier today for full details. Barriers to discharge:   · Possible alcohol withdrawal  · Nausea/vomiting- need endoscopy most likely outpatient     Consults: IP CONSULT TO PHARMACY    Recommended to review admission imaging for incidental findings and document in discharge navigator: Chart reviewed, no known incidental findings noted at this time. Discharge Plan: Anticipate discharge in >72 hrs to discharge location to be determined pending rehab evaluations. PT Recommendations: Post acute rehabilitation services  OT Recommendations: Post acute rehabilitation services      Patient seen and evaluated by Critical Care today and deemed to be appropriate for transfer to Med Surg. Spoke to Dr. Aide Chicas from [facility] to accept transfer. Critical care can be contacted via [employer] with any questions or concerns.

## 2023-09-19 NOTE — ASSESSMENT & PLAN NOTE
Patient is with acute onset of confusion significant memory problem, conversation with the patient he did not recall that his mother was here today he is redirectable but he is anxious about his memory losses. This seems to be happening over the last month he stated he had 1 episode where he called the police because he said somebody cut his trees down at his house but that never happened. Per him that happened 2 weeks, unclear at this time if his time perception is intact. I have a call out to his mother to discuss condition. · Started patient on acyclovir vancomycin and ceftriaxone for encephalitis coverage  · MRI of the brain tomorrow  · CT of the head performed today  · Lumbar puncture  · Consider EEG may need 24-hour EEG -he did have a seizure like activity and a postictal state possible.

## 2023-09-19 NOTE — ASSESSMENT & PLAN NOTE
· Currently taking lisinopril 5 mg qd and HCTZ 25 mg qd  · Did not take BP medications this morning and BP elevated in the /119  · BP subsequently improved in the ED without medication  · Hold home blood pressure medications at this time  · Avoid hypotension

## 2023-09-19 NOTE — ASSESSMENT & PLAN NOTE
· Patient complains of feeling weak and some shaking in his legs, feels funny to ambulate and feels like he is weak not specific on one side or the other  · Stated at home he saw his mother and she was in both auditory and visual hallucination   · Been feeling unwell for the last 2 years he said but it was corrected because he is unsure of the timing last couple days  · He lives by himself but has family members like his mother visiting him

## 2023-09-19 NOTE — PROGRESS NOTES
[address] [facility]  ICU acceptance Note  Name: Giorgi Lambert  MRN: [id number]  Unit/Bed#: -01 I Date of Admission: 9/18/2023   Date of Service: 9/18/2023 I Hospital Day: 0    Assessment/Plan   Acute encephalopathy  Assessment & Plan  Patient is with acute onset of confusion significant memory problem, conversation with the patient he did not recall that his mother was here today he is redirectable but he is anxious about his memory losses. This seems to be happening over the last month he stated he had 1 episode where he called the police because he said somebody cut his trees down at his house but that never happened. Per him that happened 2 weeks, unclear at this time if his time perception is intact. I have a call out to his mother to discuss condition. · Started patient on acyclovir vancomycin and ceftriaxone for encephalitis coverage  · MRI of the brain tomorrow  · CT of the head performed today  · Lumbar puncture  · Consider EEG may need 24-hour EEG -he did have a seizure like activity and a postictal state possible. Generalized weakness  Assessment & Plan  · Patient complains of feeling weak and some shaking in his legs, feels funny to ambulate and feels like he is weak not specific on one side or the other  · Stated at home he saw his mother and she was in both auditory and visual hallucination   · Been feeling unwell for the last 2 years he said but it was corrected because he is unsure of the timing last couple days  · He lives by himself but has family members like his mother visiting him     Hyponatremia  Assessment & Plan  Presents to ED with nausea vomiting and diarrhea, for last 3 days. Patient has been having crampy abdominal pain as well with these episodes. Has been having poor oral intake secondary to the nausea and vomiting. Has not been able to eat or drink much. Has not been taking his medications either due to pain.   Sodium on admission 130, corrected sodium: 135 - will control sugars better. Urine sodium, urine creatinine, urine osmo, and serum osmo ordered  TSH ordered   Start on IVF    Lab Results   Component Value Date    SODIUM 130 (L) 09/18/2023       Nausea vomiting and diarrhea  Assessment & Plan  Patient came with complaints of nausea vomiting and diarrhea since Saturday and decreased oral intake and weakness not take his medications since then, chart denied ingestion of fish" for 3 or more X no recent well water consumption, no recent travel or swimming in lakes  ·   · Of the gallbladder was performed-showed large amount of echogenic material sludgelike material no evidence of acute cholecystitis  · MRCP was negative for acute cholecystitis  · Lipase is normal  · Keep n.p.o. for now  · Total samples are pending  · Surgery is following    Diabetes mellitus [facility])  Assessment & Plan  Lab Results   Component Value Date    HGBA1C 12.3 (H) 08/19/2021       Recent Labs     09/18/23  1246 09/18/23  1952   POCGLU 283* 189*       Blood Sugar Average: Last 72 hrs:  (P) 236     Insulin sliding scale     Hypertension  Assessment & Plan  · Currently taking lisinopril 5 mg qd and HCTZ 25 mg qd  · Did not take BP medications this morning and BP elevated in the /119  · BP subsequently improved in the ED without medication  · Hold home blood pressure medications at this time  · Avoid hypotension    * Sepsis [facility])  Assessment & Plan  Patient presented to the ED with nausea vomiting diarrhea for the last 3 days with generalized weakness patient met SIRS criteria with a leukocytosis of 16.5 and tachycardia with source suspected GI source.   She was evaluated by surgery had an MRCP which was negative for acute cholecystitis is UA is negative blood cultures were sent and are pending and stool samples were ordered lactic was normal.   · Due to patient's confusion and a seizure-like activity concern for meningitis versus encephalitis  · He was started on acyclovir vancomycin and ceftriaxone  · Pt is under 48years of age no recent listeria reported in the area ( hold ampicillin)   · For LP tomorrow  · CT of the head appears negative   · MRI of the head tomorrow  · Check for tickborne illnesses patient is living in a [facility] area,  [facility]             ICU Core Measures     A: Assess, Prevent, and Manage Pain · Has pain been assessed? Yes  · Need for changes to pain regimen? NA   B: Both SAT/SAT  · N/A   C: Choice of Sedation · RASS Goal: 0 Alert and Calm or N/A patient not on sedation  · Need for changes to sedation or analgesia regimen? NA   D: Delirium · CAM-ICU: Negative   E: Early Mobility  · Plan for early mobility? Yes   F: Family Engagement · Plan for family engagement today? Yes       Antibiotic Review: Awaiting culture results. Prophylaxis:  VTE VTE covered by:  Adria Sales ON 9/19/2023] enoxaparin, Subcutaneous       Stress Ulcer  covered bypantoprazole (PROTONIX) injection 40 mg [[id number]]       Subjective   HPI/24hr events:   Patient is a 69-year-old male with a medical history of hypertension, sciatica he was last seen by an urgent care on 4/20/2023 for sciatica, patient history from the chart is that he had nausea vomiting diarrhea since Saturday with hot and cold sweats lightheadedness and hallucinations. Talking to him that she does not remember that his mother was here pretty much all day, he thinks he was in the hospital on 2 different incidents, what happened today for him happened 2 weeks ago. When I was talking to his mother stated today he called the police because he thought somebody cut his trees down. An ambulance was also called and that is how he got to the hospital.   When the rapid response on the floor was called for possible seizure, patient was awake but confused when I arrived. Considering postictal state?   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 I ordered MRI of the brain, and possible LP study and covered for bacterial and viral meningitis, also checked for Lyme West Nile and babesiosis. Patient states he drinks 3-4 beers at least 3-day his mother states he drinks daily 3-4 beers  Gave patient 1 dose of thiamine and Folate     Review of Systems   Gastrointestinal: Positive for diarrhea, nausea and vomiting. Psychiatric/Behavioral: Positive for confusion. All other systems reviewed and are negative. Objective           Patient is able to answer all questions and talkative, but some of his answers are actual wrong                      Vitals I/O      Most Recent Min/Max in 24hrs   Temp 97.6 °F (36.4 °C) Temp  Min: 97.6 °F (36.4 °C)  Max: 99 °F (37.2 °C)   Pulse 100 Pulse  Min: 89  Max: 109   Resp 21 Resp  Min: 18  Max: 21   BP (!) 161/103 BP  Min: 131/75  Max: 182/119   O2 Sat 91 % SpO2  Min: 91 %  Max: 96 %      Intake/Output Summary (Last 24 hours) at 9/18/2023 2136  Last data filed at 9/18/2023 1418  Gross per 24 hour   Intake 1000 ml   Output --   Net 1000 ml         Diet NPO; Sips with meds     Invasive Monitoring Physical exam    Physical Exam  Eyes:      Pupils: Pupils are equal, round, and reactive to light. Skin:     General: Skin is warm and dry. HENT:      Head: Normocephalic and atraumatic. Neck:     Vascular: No JVD. Cardiovascular:      Rate and Rhythm: Regular rhythm. Tachycardia present. Pulses: Normal pulses. Heart sounds: Normal heart sounds. Musculoskeletal:         General: Normal range of motion. Abdominal:      General: There is no distension. Palpations: Abdomen is soft. Tenderness: There is no abdominal tenderness. Constitutional:       General: He is awake. Appearance: Normal appearance. He is well-developed and normal weight.    Pulmonary:      Effort: Pulmonary effort is normal.      Breath sounds: Normal breath sounds. Psychiatric:         Behavior: Behavior is cooperative. Neurological:      Mental Status: He is alert. Sensory: Sensation is intact. Motor: gross motor function is at baseline for patient. Comments: Patient is alert and oriented x3 knows where he is his name time and day but cannot recall the events from today   Vitals and nursing note reviewed. Diagnostic Studies      EKG: NST to ST  Imaging:  I have personally reviewed pertinent reports.        Medications:  Scheduled PRN   acyclovir, 10 mg/kg (Adjusted), Q8H  [START ON 9/19/2023] cefTRIAXone, 2,000 mg, Q12H  [START ON 9/19/2023] Cinnamon, 1 tablet, Daily  [START ON 9/19/2023] enoxaparin, 40 mg, Daily  glucosamine-chondroitin, 1 tablet, TID  [START ON 9/19/2023] insulin lispro, 1-6 Units, TID AC  insulin lispro, 1-6 Units, HS  [START ON 9/19/2023] lisinopril, 5 mg, Daily  [START ON 9/19/2023] multivitamin-minerals, 1 tablet, Daily  [START ON 9/19/2023] pantoprazole, 40 mg, Q24H [address]  [START ON 9/19/2023] vancomycin, 1,500 mg, Q12H  vancomycin, 2,000 mg, Once      acetaminophen, 650 mg, Q6H PRN  calcium carbonate, 1,000 mg, Daily PRN  hydrALAZINE, 5 mg, Q6H PRN  ondansetron, 4 mg, Q6H PRN  simethicone, 80 mg, 4x Daily PRN       Continuous    multi-electrolyte, 125 mL/hr, Last Rate: 125 mL/hr (09/18/23 2109)         Labs:    CBC    Recent Labs     09/18/23  1313   WBC 16.47*   HGB 14.8   HCT 42.7   PLT 90*     BMP    Recent Labs     09/18/23  1313   SODIUM 130*   K 4.1   CL 94*   CO2 25   AGAP 11   BUN 14   CREATININE 0.85   CALCIUM 9.6       Coags    No recent results     Additional Electrolytes  No recent results       Blood Gas    No recent results  Recent Labs     09/18/23  1313   PHVEN 7.421*   AYD1XAO 39.9*   PO2VEN 35.9   JPA3AMC 25.4   BEVEN 0.9    LFTs  Recent Labs     09/18/23  1313   ALT 31   AST 65*   ALKPHOS 153*   ALB 4.1   TBILI 2.82*       Infectious  Recent Labs 09/18/23  1313   PROCALCITONI 0.19     Glucose  Recent Labs     09/18/23  1313   GLUC 314*               Critical Care Time Delivered: Upon my evaluation, this patient had a high probability of imminent or life-threatening deterioration due to Possible viral versus bacterial meningitis acute encephalopathy, which required my direct attention, intervention, and personal management. I have personally provided 45 minutes of critical care time, exclusive of procedures, teaching, family meetings, and any prior time recorded by providers other than myself.       Teodoro Brito PA-C

## 2023-09-19 NOTE — ASSESSMENT & PLAN NOTE
Patient presented to the ED with nausea vomiting diarrhea for the last 3 days with generalized weakness patient met SIRS criteria with a leukocytosis of 16.5 and tachycardia with source suspected GI source.   She was evaluated by surgery had an MRCP which was negative for acute cholecystitis is UA is negative blood cultures were sent and are pending and stool samples were ordered lactic was normal.   · Due to patient's confusion and a seizure-like activity concern for meningitis versus encephalitis  · He was started on acyclovir vancomycin and ceftriaxone  · Pt is under 48years of age no recent listeria reported in the area ( hold ampicillin)   · For LP tomorrow  · CT of the head appears negative   · MRI of the head tomorrow  · Check for tickborne illnesses patient is living in [place] area,  [facility]

## 2023-09-19 NOTE — PROCEDURES
Lumbar puncture    Date/Time: 2:46 PM   Performed by: Meg Arias MD (Supervised by Dr. Wendy Brown)  Authorized by: Meg Hernandez MD    Universal Protocol:  Procedure performed by: Meg Arias MD  Consent: Verbal consent obtained. Written consent obtained. Risks and benefits: risks, benefits and alternatives were discussed  Consent given by: Mother and Spouse (Patient confused due to AMS)  Timeout called at: 2:46 PM   Patient understanding: Mother and spouse states understanding of the procedure being performed  Patient consent: the mother and spouse's understanding of the procedure matches consent given  Procedure consent: procedure consent matches procedure scheduled  Relevant documents: relevant documents present and verified  Test results: test results available and properly labeled  Site marked: the operative site was marked  Patient identity confirmed: verbally and hospital-assigned identification number identified    Patient location:  Bedside  Indications: evaluation for infection, evaluation for altered mental status and evaluation of closing pressure  Sedation:   Sedation type: Anxiolysis  Anesthesia (see MAR for exact dosages): Anesthesia method:  Local infiltration    Local anesthetic:  Lidocaine 1% w/o epi (5 cc per location)  Procedure details:     Lumbar space:  L4-L5 interspace     Patient position:  L lateral decubitus     Equipment: Lumbar puncture kit used      Needle gauge:  22G x 3.5in    Needle type:  Spinal needle - Quincke tip    Ultrasound guidance: no      Number of attempts:  3    Closing pressure (cm H2O):  14.8    Fluid appearance:  Blood Tinged    Tubes of fluid:  4    Total volume (ml):  13  Post-procedure:     Puncture site:  Adhesive bandage applied    Patient tolerance of procedure:   Tolerated well, no immediate complications    Patient instructed to lie flat for one(1) hour post lumbar puncture.: Yes      Position was positioned and given pain control and anxiolytics. Spinal interspace located with anatomic landmarks. Time out was called and confirmed consent for procedure. Patient was sterilely draped and local anesthesia achieved by infiltration. Spinal needle was inserted and slightly blood tinged CSF obtained. Closing pressure was obtained. Patient tolerated procedure well. Patient instructed to lie flat for at least 1 hr and was given fluids and tylenol after procedure.      Thank you for allowing me to participate in the care of your patient, MD Cayden Rodríguez

## 2023-09-19 NOTE — PROGRESS NOTES
Scott Casanova is a 38 y.o. male who is currently ordered Vancomycin IV with management by the Pharmacy Consult service. Relevant clinical data and objective / subjective history reviewed. Vancomycin Assessment:  Indication and Goal AUC/Trough: CNS infection (goal -600, trough 15-20)  Clinical Status:  new start  Micro:     Renal Function:  SCr: 0.85 mg/dL  CrCl: 132.5 mL/min  Renal replacement: Not on dialysis  Days of Therapy: 1  Current Dose: 2000 mg IV once (loading dose)  Vancomycin Plan:  New Dosin mg IV q 12 hours  Estimated AUC: 557 mcg*hr/mL  Estimated Trough: 16.9 mcg/mL  Next Level: 23 at 0600  Renal Function Monitoring: Daily BMP and [place] will continue to follow closely for s/sx of nephrotoxicity, infusion reactions and appropriateness of therapy. BMP and CBC will be ordered per protocol. We will continue to follow the patient’s culture results and clinical progress daily.     Dougie Byrne, Pharmacist

## 2023-09-19 NOTE — PLAN OF CARE
Problem: Knowledge Deficit  Goal: Patient/family/caregiver demonstrates understanding of disease process, treatment plan, medications, and discharge instructions  Description: Complete learning assessment and assess knowledge base.   Interventions:  - Provide teaching at level of understanding  - Provide teaching via preferred learning methods  Outcome: Progressing     Problem: NEUROSENSORY - ADULT  Goal: Achieves stable or improved neurological status  Description: INTERVENTIONS  - Monitor and report changes in neurological status  - Monitor vital signs such as temperature, blood pressure, glucose, and any other labs ordered   - Initiate measures to prevent increased intracranial pressure  - Monitor for seizure activity and implement precautions if appropriate      Outcome: Progressing     Problem: CARDIOVASCULAR - ADULT  Goal: Maintains optimal cardiac output and hemodynamic stability  Description: INTERVENTIONS:  - Monitor I/O, vital signs and rhythm  - Monitor for S/S and trends of decreased cardiac output  - Administer and titrate ordered vasoactive medications to optimize hemodynamic stability  - Assess quality of pulses, skin color and temperature  - Assess for signs of decreased coronary artery perfusion  - Instruct patient to report change in severity of symptoms  Outcome: Progressing

## 2023-09-19 NOTE — ASSESSMENT & PLAN NOTE
Presents to ED with nausea vomiting and diarrhea, for last 3 days. Patient has been having crampy abdominal pain as well with these episodes. Has been having poor oral intake secondary to the nausea and vomiting. Has not been able to eat or drink much. Has not been taking his medications either due to pain. Sodium on admission 130, corrected sodium: 135 - will control sugars better.     Urine sodium, urine creatinine, urine osmo, and serum osmo ordered  TSH ordered   Start on IVF    Lab Results   Component Value Date    SODIUM 130 (L) 09/18/2023

## 2023-09-19 NOTE — ASSESSMENT & PLAN NOTE
Patient presented to the ED with nausea vomiting diarrhea for the last 3 days with generalized weakness patient met SIRS criteria with a leukocytosis of 16.5 and tachycardia with source suspected GI source.   She was evaluated by surgery had an MRCP which was negative for acute cholecystitis is UA is negative blood cultures were sent and are pending and stool samples were ordered lactic was normal.   · Due to patient's confusion and a seizure-like activity concern for meningitis versus encephalitis  · He was started on acyclovir vancomycin and ceftriaxone  · Pt is under 48years of age no recent listeria reported in the area ( hold ampicillin)   · For LP tomorrow  · CT of the head appears negative   · MRI of the head tomorrow  · Check for tickborne illnesses patient is living in a [place] area,  [facility]

## 2023-09-19 NOTE — RAPID RESPONSE
Rapid Response Note  Jorge Alberto Ware 38 y.o. male MRN: [id number]  Unit/Bed#: -01 Encounter: [id number]    Rapid Response Notification(s):   Response called date/time:  9/18/2023 7:50 PM  Response team arrival date/time:  9/18/2023 7:52 PM  Response end date/time:  9/18/2023 8:15 PM  Level of care:  Medsur  [facility]  Primary reason for rapid response call:  New onset of seizures and acute change in neuro status    Rapid Response Intervention(s):   Airway:  None  Breathing:  None  Fluids administered:  None  Medications administered:  None       Assessment:   · Possible new onset of seizures  · Acute encephalopathy  · Concern for encephalitis  Plan:   · See transfer note to ICU     Rapid Response Outcome:   Transfer:  Transfer to stepdown 1  Primary service notified of transfer: Yes    Code Status: Level 1 (Full Code)      Family notified of transfer: yes  Family member contacted: mother     Background/Situation:   Jorge Alberto Ware is a 38 y.o. male medical history of hypertension and diabetes who came to the ER for 2 days worth of nausea vomiting diarrhea and acute confusion and mental status changes. Patient had a possible seizure and was transferred to the ICU    Review of Systems   All other systems reviewed and are negative. Objective:   Vitals:    09/18/23 2115 09/18/23 2130 09/18/23 2230 09/18/23 2233   BP: (!) 161/103 155/98 151/88    BP Location: Left arm Left arm Right arm    Pulse: 100 103 103    Resp: 21 (!) 26 19    Temp:   99.4 °F (37.4 °C)    TempSrc:   Oral    SpO2: 91% 92% 94% 98%   Weight:       Height:         Physical Exam  Vitals and nursing note reviewed. Constitutional:       General: He is not in acute distress. Appearance: Normal appearance. He is well-developed and normal weight. HENT:      Head: Normocephalic and atraumatic. Eyes:      Conjunctiva/sclera: Conjunctivae normal.   Cardiovascular:      Rate and Rhythm: Regular rhythm. Tachycardia present. Pulses: Normal pulses. Heart sounds: No murmur heard. Pulmonary:      Effort: Pulmonary effort is normal. No respiratory distress. Breath sounds: Normal breath sounds. Abdominal:      General: There is no distension. Palpations: Abdomen is soft. Tenderness: There is no abdominal tenderness. Musculoskeletal:         General: No swelling. Normal range of motion. Cervical back: Neck supple. Skin:     General: Skin is warm and dry. Capillary Refill: Capillary refill takes less than 2 seconds. Neurological:      Mental Status: He is alert. He is confused. Comments: Patient has mild tremors in bilateral hands  Also seems to have memory issues   Psychiatric:         Attention and Perception: He perceives auditory and visual hallucinations. Portions of the record may have been created with voice recognition software. Occasional wrong word or "sound a like" substitutions may have occurred due to the inherent limitations of voice recognition software. Read the chart carefully and recognize, using context, where substitutions have occurred.     Teodoro Brito PA-C

## 2023-09-19 NOTE — ASSESSMENT & PLAN NOTE
Lab Results   Component Value Date    HGBA1C 12.3 (H) 08/19/2021       Recent Labs     09/18/23  1246 09/18/23 1952 09/18/23  2106   POCGLU 283* 189* 254*       Blood Sugar Average: Last 72 hrs:  (P) 242     Insulin sliding scale

## 2023-09-19 NOTE — OCCUPATIONAL THERAPY NOTE
Occupational Therapy Evaluation     Patient Name: Lyndsey Johnson  Today's Date: 9/19/2023  Problem List  Active Problems: There are no active Hospital Problems. Past Medical History  Past Medical History:   Diagnosis Date    Allergic     Diabetes mellitus ([address])     Gout     Hypertension     Psoriasis     Sinus infection      Past Surgical History  Past Surgical History:   Procedure Laterality Date    DENTAL SURGERY      KNEE SURGERY Left     NOSE SURGERY      WRIST FRACTURE SURGERY               09/19/23 1032   OT Last Visit   OT Visit Date 09/19/23   Note Type   Note type Evaluation   Pain Assessment   Pain Assessment Tool FLACC   Pain Rating: FLACC (Rest) - Face 0   Pain Rating: FLACC (Rest) - Legs 0   Pain Rating: FLACC (Rest) - Activity 0   Pain Rating: FLACC (Rest) - Cry 1   Pain Rating: FLACC (Rest) - Consolability 0   Score: FLACC (Rest) 1   Pain Rating: FLACC (Activity) - Face 0   Pain Rating: FLACC (Activity) - Legs 0   Pain Rating: FLACC (Activity) - Activity 1   Pain Rating: FLACC (Activity) - Cry 0   Pain Rating: FLACC (Activity) - Consolability 0   Score: FLACC (Activity) 1   Restrictions/Precautions   Weight Bearing Precautions Per Order No   Other Precautions Contact/isolation;Cognitive; Impulsive; Chair Alarm; Bed Alarm; Restraints;Multiple lines;Telemetry; Fall Risk;Pain;Seizure  (posey belt; B/L wrist restraints)   Home Living   Type of [address] [place] [address] Two level  (0 JERRICA)   Home Equipment Other (Comment)  (denies any)   Additional Comments Pt is confused; per mother, pt resides alone but has 2 dghts (8 and 11) who live w/ him Mon-Fri. 2 SH w/ 0 JERRICA. 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 General   Additional General Comments (S)  Hallucinating cats in the room   ADL   Eating Assistance 3  Moderate Assistance   Grooming Assistance 3  Moderate Assistance   UB Bathing Assistance 2  Maximal Assistance   LB Bathing Assistance 2  Maximal Assistance   UB Dressing Assistance 2  Maximal [place] 2  Maximal [address] [place] [address]  2  Maximal Assistance   Functional Assistance 3  Moderate Assistance   Functional Deficit Steadying; Impulsive;Verbal cueing;Supervision/safety; Increased time to complete  (Ax2)   Bed Mobility   Supine to Sit 3  Moderate assistance   Additional items Assist x 1;HOB elevated; Increased time required;Verbal cues;LE management   Sit to Supine Unable to assess   Additional Comments pt sat EOB w/ Min-Mod A for sitting balance/trunk control   Transfers   Sit to Stand 3  Moderate assistance   Additional items Assist x 2; Increased time required;Verbal cues   Stand to Sit 3  Moderate assistance   Additional items Assist x 2; Increased time required;Verbal cues   Additional Comments B/L HHA used   Functional Mobility   Functional Mobility 3  Moderate assistance   Additional Comments pt took few small steps from EOB to chair w/ Mod A x2; B/L HHA used. VC for initiation.    Additional items Hand hold assistance   Balance   Static Sitting Poor +   Dynamic Sitting Poor   Static Standing Poor   Dynamic Standing Poor   Ambulatory Poor -   Activity Tolerance   Activity Tolerance Patient limited by fatigue   Medical Staff Made Aware Areli MARROQUIN   Nurse Made Aware yes, Martínez Thomas Assessment   RUE Assessment X  (poor MMT command following; grossly 3+/5)   LUE Assessment   LUE Assessment X  (poor MMT command following; grossly 3+/5)   Hand Function   Gross Motor Coordination Functional   Fine Motor Coordination Functional   Vision - Complex Assessment   Additional Comments minimally opens eyes w/ Max VC; unable to formally assess at this time   Cognition   Overall Cognitive Status Impaired   Arousal/Participation Arousable;Lethargic;Poorly responsive   Attention Difficulty attending to directions   Orientation Level Oriented to person;Disoriented to place; Disoriented to time;Disoriented to situation   Memory Decreased recall of precautions;Decreased recall of recent events;Decreased short term memory   Following Commands Follows one step commands with increased time or repetition   Comments pt is overall cooperative; very lethargic; poorly responsive and confused. Reports its 1977 and that he lives w/ his mother and siblings. Reports seeing cats in the room. Is difficult to redirect. eyes remained closed most of session   Assessment   Limitation Decreased ADL status; Decreased UE strength;Decreased Safe judgement during ADL;Decreased UE ROM; Decreased cognition;Decreased endurance;Decreased self-care trans;Decreased high-level ADLs; Visual deficit   Prognosis Fair   Assessment Pt is a 37 y/o male seen for OT eval s/p adm to [facility] as a transfer from [facility] 9/18 2/2 nausea, vomiting and diarrhea. Pt is  dx'd w/ new onset of seizure activity; ruling out infectious cause. Pt  has a past medical history of Allergic, Diabetes mellitus ([address]), Gout, Hypertension, Psoriasis, and Sinus infection. Pt with active OT orders and up with assistance  orders. Pt lives alone in 2 SH, 0 JERRICA. Pt was I w/  ADLS and IADLS, drove, & required no use of DME PTA. Pt is currently demonstrating the following occupational deficits: Max A UB/LB ADLS, Mod A bed mobility, Mod A x2 transfers and functional mobility w/ B/L HHA. These deficits that are impacting pt's baseline areas of occupation are a result of the following impairments: pain, endurance, activity tolerance, functional mobility, forward functional reach, balance, trunk control, functional standing tolerance, decreased I w/ ADLS/IADLS, strength, ROM, visual deficits, cognitive impairments, decreased safety awareness and decreased insight into deficits. The following Occupational Performance Areas to address include: eating, grooming, bathing/shower, toilet hygiene, dressing, medication management, socialization, health maintenance, functional mobility, community mobility, clothing management and household maintenance. Recommend inpatient rehab  upon D/C. Pt to continue to benefit from acute immediate OT services to address the following goals 2-3x/week to  w/in 10-14 days:   Goals   Patient Goals unable to state 2/2 confusion   LTG Time Frame 10-   Long Term Goal #1 see below listed goals   Plan   Treatment Interventions ADL retraining;Functional transfer training;Visual perceptual retraining;UE strengthening/ROM; Cognitive reorientation; Endurance training;Patient/family training;Equipment evaluation/education; Compensatory technique education;Continued evaluation; Activityengagement; Energy conservation   Goal Expiration Date 10/03/23   OT Frequency 2-3x/wk   Recommendation   OT Discharge Recommendation Post acute rehabilitation services   Additional Comments  The patient's raw score on the -PAC Daily Activity Inpatient Short Form is 12. A raw score of less than 19 suggests the patient may benefit from discharge to post-acute rehabilitation services. Please refer to the recommendation of the Occupational Therapist for safe discharge planning. Additional Comments 2 Pt seen as a co-session due to the patient's co-morbidities, clinically unstable presentation, and present impairments which are a regression from the patient's baseline.    AM-PAC Daily Activity Inpatient   Lower Body Dressing 2   Bathing 2   Toileting 2   Upper Body Dressing 2 Grooming 2   Eating 2   Daily Activity Raw Score 12   Daily Activity Standardized Score (Calc for Raw Score >=11) 30.6   AM-PAC Applied Cognition Inpatient   Following a Speech/Presentation 1   Understanding Ordinary Conversation 2   Taking Medications 1   Remembering Where Things Are Placed or Put Away 1   Remembering List of 4-5 Errands 1   Taking Care of Complicated Tasks 1   Applied Cognition Raw Score 7   Applied Cognition Standardized Score 15.17   End of Consult   Education Provided Family or social support of family present for education by provider;Yes   Patient Position at End of Consult Bedside chair;Bed/Chair alarm activated; All needs within reach   Nurse Communication Nurse aware of consult       GOALS    1) Pt will complete rolling left/right in bed with S assist, as prerequisite for further engagement in ADLS   2) Pt will complete supine to sit transfer with S using B/L UEs to initiate bed mobility   3) Pt will tolerate sitting at EOB 20 minutes with S assist and stable vital signs, as prerequisite for further engagement in ADLS   4) Pt will complete grooming task with S assist and increased time to increase independence in functional tasks  5) Pt will increase B/L UE ROM 1/2 MMT to increase functional UB use during ADLS   6) Pt will complete UB ADLS with Min A and use of AD/DME as needed to increase independence in functional tasks  7) Pt will complete LB ADLS with Min A and use of AD/DME as needed to increase independence in functional tasks  8) Pt will complete sit to stand transfer / sit pivot transfer / stand pivot transfer with Min assist on/off all ADL surfaces   9) Pt will follow 100% simple one step verbal commands and be A/Ox4 consistently t/o use of external environmental cues to increase awareness for functional tasks  10) Pt will demonstrate 100% carryover of E.C. techniques t/o fx'l I/ADL/ leisure tasks w/o cues s/p skilled education  11) Pt will be attentive 100% of the time during ongoing formal cognitive assessment to increase engagement in functional tasks  12) Pt will improve functional mobility to Min A w/ use of AD/DME as needed to increase engagement in meaningful tasks    Maximus Aguiar MS, OTR/L

## 2023-09-19 NOTE — PLAN OF CARE
Problem: OCCUPATIONAL THERAPY ADULT  Goal: Performs self-care activities at highest level of function for planned discharge setting. See evaluation for individualized goals. Description: Treatment Interventions: ADL retraining, Functional transfer training, Visual perceptual retraining, UE strengthening/ROM, Cognitive reorientation, Endurance training, Patient/family training, Equipment evaluation/education, Compensatory technique education, Continued evaluation, Activityengagement, Energy conservation          See flowsheet documentation for full assessment, interventions and recommendations. Note: Limitation: Decreased ADL status, Decreased UE strength, Decreased Safe judgement during ADL, Decreased UE ROM, Decreased cognition, Decreased endurance, Decreased self-care trans, Decreased high-level ADLs, Visual deficit  Prognosis: Fair  Assessment: Pt is a 37 y/o male seen for OT eval s/p adm to [facility] as a transfer from [address] [place] [address] 9/18 2/2 nausea, vomiting and diarrhea. Pt is  dx'd w/ new onset of seizure activity; ruling out infectious cause. Pt  has a past medical history of Allergic, Diabetes mellitus ([address]), Gout, Hypertension, Psoriasis, and Sinus infection. Pt with active OT orders and up with assistance  orders. Pt lives alone in 2 , 0 JERRICA. Pt was I w/  ADLS and IADLS, drove, & required no use of DME PTA. Pt is currently demonstrating the following occupational deficits: Max A UB/LB ADLS, Mod A bed mobility, Mod A x2 transfers and functional mobility w/ B/L HHA. These deficits that are impacting pt's baseline areas of occupation are a result of the following impairments: pain, endurance, activity tolerance, functional mobility, forward functional reach, balance, trunk control, functional standing tolerance, decreased I w/ ADLS/IADLS, strength, ROM, visual deficits, cognitive impairments, decreased safety awareness and decreased insight into deficits. The following Occupational Performance Areas to address include: eating, grooming, bathing/shower, toilet hygiene, dressing, medication management, socialization, health maintenance, functional mobility, community mobility, clothing management and household maintenance. Recommend inpatient rehab  upon D/C.  Pt to continue to benefit from acute immediate OT services to address the following goals 2-3x/week to  w/in 10-14 days:     OT Discharge Recommendation: Post acute rehabilitation services        Annie Santiago MS, OTR/L

## 2023-09-19 NOTE — ASSESSMENT & PLAN NOTE
Patient came with complaints of nausea vomiting and diarrhea since Saturday and decreased oral intake and weakness not take his medications since then, chart denied ingestion of fish" for 3 or more X no recent well water consumption, no recent travel or swimming in lakes  ·   · Of the gallbladder was performed-showed large amount of echogenic material sludgelike material no evidence of acute cholecystitis  · MRCP was negative for acute cholecystitis  · Lipase is normal  · Keep n.p.o. for now  · Total samples are pending  · Surgery is following

## 2023-09-19 NOTE — PLAN OF CARE
Problem: MOBILITY - ADULT  Goal: Maintain or return to baseline ADL function  Description: INTERVENTIONS:  -  Assess patient's ability to carry out ADLs; assess patient's baseline for ADL function and identify physical deficits which impact ability to perform ADLs (bathing, care of mouth/teeth, toileting, grooming, dressing, etc.)  - Assess/evaluate cause of self-care deficits   - Assess range of motion  - Assess patient's mobility; develop plan if impaired  - Assess patient's need for assistive devices and provide as appropriate  - Encourage maximum independence but intervene and supervise when necessary  - Involve family in performance of ADLs  - Assess for home care needs following discharge   - Consider OT consult to assist with ADL evaluation and planning for discharge  - Provide patient education as appropriate  Outcome: Progressing  Goal: Maintains/Returns to pre admission functional level  Description: INTERVENTIONS:  - Perform BMAT or MOVE assessment daily.   - Set and communicate daily mobility goal to care team and patient/family/caregiver. - Collaborate with rehabilitation services on mobility goals if consulted  - Perform Range of Motion  times a day. - Reposition patient every  hours.   - Dangle patient times a day  - Stand patient  times a day  - Ambulate patient  times a day  - Out of bed to chair  times a day   - Out of bed for meals  times a day  - Out of bed for toileting  - Record patient progress and toleration of activity level   Outcome: Progressing     Problem: PAIN - ADULT  Goal: Verbalizes/displays adequate comfort level or baseline comfort level  Description: Interventions:  - Encourage patient to monitor pain and request assistance  - Assess pain using appropriate pain scale  - Administer analgesics based on type and severity of pain and evaluate response  - Implement non-pharmacological measures as appropriate and evaluate response  - Consider cultural and social influences on pain and pain management  - Notify physician/advanced practitioner if interventions unsuccessful or patient reports new pain  Outcome: Progressing     Problem: INFECTION - ADULT  Goal: Absence or prevention of progression during hospitalization  Description: INTERVENTIONS:  - Assess and monitor for signs and symptoms of infection  - Monitor lab/diagnostic results  - Monitor all insertion sites, i.e. indwelling lines, tubes, and drains  - Monitor endotracheal if appropriate and nasal secretions for changes in amount and color  - [place] appropriate cooling/warming therapies per order  - Administer medications as ordered  - Instruct and encourage patient and family to use good hand hygiene technique  - Identify and instruct in appropriate isolation precautions for identified infection/condition  Outcome: Progressing  Goal: Absence of fever/infection during neutropenic period  Description: INTERVENTIONS:  - Monitor WBC    Outcome: Progressing     Problem: SAFETY ADULT  Goal: Maintain or return to baseline ADL function  Description: INTERVENTIONS:  -  Assess patient's ability to carry out ADLs; assess patient's baseline for ADL function and identify physical deficits which impact ability to perform ADLs (bathing, care of mouth/teeth, toileting, grooming, dressing, etc.)  - Assess/evaluate cause of self-care deficits   - Assess range of motion  - Assess patient's mobility; develop plan if impaired  - Assess patient's need for assistive devices and provide as appropriate  - Encourage maximum independence but intervene and supervise when necessary  - Involve family in performance of ADLs  - Assess for home care needs following discharge   - Consider OT consult to assist with ADL evaluation and planning for discharge  - Provide patient education as appropriate  Outcome: Progressing  Goal: Maintains/Returns to pre admission functional level  Description: INTERVENTIONS:  - Perform BMAT or MOVE assessment daily.   - Set and communicate daily mobility goal to care team and patient/family/caregiver. - Collaborate with rehabilitation services on mobility goals if consulted  - Perform Range of Motion  times a day. - Reposition patient every  hours.   - Dangle patient  times a day  - Stand patient  times a day  - Ambulate patient  times a day  - Out of bed to chair  times a day   - Out of bed for meals  times a day  - Out of bed for toileting  - Record patient progress and toleration of activity level   Outcome: Progressing  Goal: Patient will remain free of falls  Description: INTERVENTIONS:  - Educate patient/family on patient safety including physical limitations  - Instruct patient to call for assistance with activity   - Consult OT/PT to assist with strengthening/mobility   - Keep Call bell within reach  - Keep bed low and locked with side rails adjusted as appropriate  - Keep care items and personal belongings within reach  - Initiate and maintain comfort rounds  - Make Fall Risk Sign visible to staff  - Offer Toileting every  Hours, in advance of need  - Initiate/Maintain alarm  - Obtain necessary fall risk management equipment:  - Apply yellow socks and bracelet for high fall risk patients  - Consider moving patient to room near nurses station  Outcome: Progressing     Problem: DISCHARGE PLANNING  Goal: Discharge to home or other facility with appropriate resources  Description: INTERVENTIONS:  - Identify barriers to discharge w/patient and caregiver  - Arrange for needed discharge resources and transportation as appropriate  - Identify discharge learning needs (meds, wound care, etc.)  - Arrange for interpretive services to assist at discharge as needed  - Refer to Case Management Department for coordinating discharge planning if the patient needs post-hospital services based on physician/advanced practitioner order or complex needs related to functional status, cognitive ability, or social support system  Outcome: Progressing Problem: Knowledge Deficit  Goal: Patient/family/caregiver demonstrates understanding of disease process, treatment plan, medications, and discharge instructions  Description: Complete learning assessment and assess knowledge base. Interventions:  - Provide teaching at level of understanding  - Provide teaching via preferred learning methods  Outcome: Progressing     Problem: NEUROSENSORY - ADULT  Goal: Achieves stable or improved neurological status  Description: INTERVENTIONS  - Monitor and report changes in neurological status  - Monitor vital signs such as temperature, blood pressure, glucose, and any other labs ordered   - Initiate measures to prevent increased intracranial pressure  - Monitor for seizure activity and implement precautions if appropriate      Outcome: Progressing  Goal: Remains free of injury related to seizures activity  Description: INTERVENTIONS  - Maintain airway, patient safety  and administer oxygen as ordered  - Monitor patient for seizure activity, document and report duration and description of seizure to physician/advanced practitioner  - If seizure occurs,  ensure patient safety during seizure  - Reorient patient post seizure  - Seizure pads on all 4 side rails  - Instruct patient/family to notify RN of any seizure activity including if an aura is experienced  - Instruct patient/family to call for assistance with activity based on nursing assessment  - Administer anti-seizure medications if ordered    Outcome: Progressing  Goal: Achieves maximal functionality and self care  Description: INTERVENTIONS  - Monitor swallowing and airway patency with patient fatigue and changes in neurological status  - Encourage and assist patient to increase activity and self care.    - Encourage visually impaired, hearing impaired and aphasic patients to use assistive/communication devices  Outcome: Progressing     Problem: CARDIOVASCULAR - ADULT  Goal: Maintains optimal cardiac output and hemodynamic stability  Description: INTERVENTIONS:  - Monitor I/O, vital signs and rhythm  - Monitor for S/S and trends of decreased cardiac output  - Administer and titrate ordered vasoactive medications to optimize hemodynamic stability  - Assess quality of pulses, skin color and temperature  - Assess for signs of decreased coronary artery perfusion  - Instruct patient to report change in severity of symptoms  Outcome: Progressing  Goal: Absence of cardiac dysrhythmias or at baseline rhythm  Description: INTERVENTIONS:  - Continuous cardiac monitoring, vital signs, obtain 12 lead EKG if ordered  - Administer antiarrhythmic and heart rate control medications as ordered  - Monitor electrolytes and administer replacement therapy as ordered  Outcome: Progressing     Problem: RESPIRATORY - ADULT  Goal: Achieves optimal ventilation and oxygenation  Description: INTERVENTIONS:  - Assess for changes in respiratory status  - Assess for changes in mentation and behavior  - Position to facilitate oxygenation and minimize respiratory effort  - Oxygen administered by appropriate delivery if ordered  - Initiate smoking cessation education as indicated  - Encourage broncho-pulmonary hygiene including cough, deep breathe, Incentive Spirometry  - Assess the need for suctioning and aspirate as needed  - Assess and instruct to report SOB or any respiratory difficulty  - Respiratory Therapy support as indicated  Outcome: Progressing     Problem: GASTROINTESTINAL - ADULT  Goal: Minimal or absence of nausea and/or vomiting  Description: INTERVENTIONS:  - Administer IV fluids if ordered to ensure adequate hydration  - Maintain NPO status until nausea and vomiting are resolved  - Nasogastric tube if ordered  - Administer ordered antiemetic medications as needed  - Provide nonpharmacologic comfort measures as appropriate  - Advance diet as tolerated, if ordered  - Consider nutrition services referral to assist patient with adequate nutrition and appropriate food choices  Outcome: Progressing  Goal: Maintains or returns to baseline bowel function  Description: INTERVENTIONS:  - Assess bowel function  - Encourage oral fluids to ensure adequate hydration  - Administer IV fluids if ordered to ensure adequate hydration  - Administer ordered medications as needed  - Encourage mobilization and activity  - Consider nutritional services referral to assist patient with adequate nutrition and appropriate food choices  Outcome: Progressing  Goal: Maintains adequate nutritional intake  Description: INTERVENTIONS:  - Monitor percentage of each meal consumed  - Identify factors contributing to decreased intake, treat as appropriate  - Assist with meals as needed  - Monitor I&O, weight, and lab values if indicated  - Obtain nutrition services referral as needed  Outcome: Progressing  Goal: Establish and maintain optimal ostomy function  Description: INTERVENTIONS:  - Assess bowel function  - Encourage oral fluids to ensure adequate hydration  - Administer IV fluids if ordered to ensure adequate hydration   - Administer ordered medications as needed  - Encourage mobilization and activity  - Nutrition services referral to assist patient with appropriate food choices  - Assess stoma site  - Consider wound care consult   Outcome: Progressing  Goal: Oral mucous membranes remain intact  Description: INTERVENTIONS  - Assess oral mucosa and hygiene practices  - Implement preventative oral hygiene regimen  - Implement oral medicated treatments as ordered  - Initiate Nutrition services referral as needed  Outcome: Progressing     Problem: GENITOURINARY - ADULT  Goal: Maintains or returns to baseline urinary function  Description: INTERVENTIONS:  - Assess urinary function  - Encourage oral fluids to ensure adequate hydration if ordered  - Administer IV fluids as ordered to ensure adequate hydration  - Administer ordered medications as needed  - Offer frequent toileting  - Follow urinary retention protocol if ordered  Outcome: Progressing  Goal: Absence of urinary retention  Description: INTERVENTIONS:  - Assess patient’s ability to void and empty bladder  - Monitor I/O  - Bladder scan as needed  - Discuss with physician/AP medications to alleviate retention as needed  - Discuss catheterization for long term situations as appropriate  Outcome: Progressing  Goal: Urinary catheter remains patent  Description: INTERVENTIONS:  - Assess patency of urinary catheter  - If patient has a chronic valentin, consider changing catheter if non-functioning  - Follow guidelines for intermittent irrigation of non-functioning urinary catheter  Outcome: Progressing     Problem: METABOLIC, FLUID AND ELECTROLYTES - ADULT  Goal: Electrolytes maintained within normal limits  Description: INTERVENTIONS:  - Monitor labs and assess patient for signs and symptoms of electrolyte imbalances  - Administer electrolyte replacement as ordered  - Monitor response to electrolyte replacements, including repeat lab results as appropriate  - Instruct patient on fluid and nutrition as appropriate  Outcome: Progressing  Goal: Fluid balance maintained  Description: INTERVENTIONS:  - Monitor labs   - Monitor I/O and WT  - Instruct patient on fluid and nutrition as appropriate  - Assess for signs & symptoms of volume excess or deficit  Outcome: Progressing  Goal: Glucose maintained within target range  Description: INTERVENTIONS:  - Monitor Blood Glucose as ordered  - Assess for signs and symptoms of hyperglycemia and hypoglycemia  - Administer ordered medications to maintain glucose within target range  - Assess nutritional intake and initiate nutrition service referral as needed  Outcome: Progressing     Problem: SKIN/TISSUE INTEGRITY - ADULT  Goal: Skin Integrity remains intact(Skin Breakdown Prevention)  Description: Assess:  -Perform [place] assessment every   -Clean and moisturize skin every   -Inspect skin when repositioning, toileting, and assisting with ADLS  -Assess under medical devices such as  every   -Assess extremities for adequate circulation and sensation     Bed Management:  -Have minimal linens on bed & keep smooth, unwrinkled  -Change linens as needed when moist or perspiring  -Avoid sitting or lying in one position for more than  hours while in bed  -Keep HOB at degrees     Toileting:  -Offer bedside commode  -Assess for incontinence every   -Use incontinent care products after each incontinent episode such as     Activity:  -Mobilize patient  times a day  -Encourage activity and walks on unit  -Encourage or provide ROM exercises   -Turn and reposition patient every  Hours  -Use appropriate equipment to lift or move patient in bed  -Instruct/ Assist with weight shifting every when out of bed in chair  -Consider limitation of chair time  hour intervals    Skin Care:  -Avoid use of baby powder, tape, friction and shearing, hot water or constrictive clothing  -Relieve pressure over bony prominences using   -Do not massage red bony areas    Next Steps:  -Teach patient strategies to minimize risks such as    -Consider consults to  interdisciplinary teams such as   Outcome: Progressing  Goal: Incision(s), wounds(s) or drain site(s) healing without S/S of infection  Description: INTERVENTIONS  - Assess and document dressing, incision, wound bed, drain sites and surrounding tissue  - Provide patient and family education  - Perform skin care/dressing changes every   Outcome: Progressing  Goal: Pressure injury heals and does not worsen  Description: Interventions:  - Implement low air loss mattress or specialty surface (Criteria met)  - Apply silicone foam dressing  - Instruct/assist with weight shifting every  minutes when in chair   - Limit chair time to  hour intervals  - Use special pressure reducing interventions such as  when in chair   - Apply fecal or urinary incontinence containment device   - Perform passive or active ROM every   - Turn and reposition patient & offload bony prominences every  hours   - Utilize friction reducing device or surface for transfers   - Consider consults to  interdisciplinary teams such as   - Use incontinent care products after each incontinent episode such   - Consider nutrition services referral as needed  Outcome: Progressing     Problem: HEMATOLOGIC - ADULT  Goal: Maintains hematologic stability  Description: INTERVENTIONS  - Assess for signs and symptoms of bleeding or hemorrhage  - Monitor labs  - Administer supportive blood products/factors as ordered and appropriate  Outcome: Progressing     Problem: MUSCULOSKELETAL - ADULT  Goal: Maintain or return mobility to safest level of function  Description: INTERVENTIONS:  - Assess patient's ability to carry out ADLs; assess patient's baseline for ADL function and identify physical deficits which impact ability to perform ADLs (bathing, care of mouth/teeth, toileting, grooming, dressing, etc.)  - Assess/evaluate cause of self-care deficits   - Assess range of motion  - Assess patient's mobility  - Assess patient's need for assistive devices and provide as appropriate  - Encourage maximum independence but intervene and supervise when necessary  - Involve family in performance of ADLs  - Assess for home care needs following discharge   - Consider OT consult to assist with ADL evaluation and planning for discharge  - Provide patient education as appropriate  Outcome: Progressing  Goal: Maintain proper alignment of affected body part  Description: INTERVENTIONS:  - Support, maintain and protect limb and body alignment  - Provide patient/ family with appropriate education  Outcome: Progressing     Problem: Prexisting or High Potential for Compromised Skin Integrity  Goal: Skin integrity is maintained or improved  Description: INTERVENTIONS:  - Identify patients at risk for skin breakdown  - Assess and monitor skin integrity  - Assess and monitor nutrition and hydration status  - Monitor labs   - Assess for incontinence   - Turn and reposition patient  - Assist with mobility/ambulation  - Relieve pressure over bony prominences  - Avoid friction and shearing  - Provide appropriate hygiene as needed including keeping skin clean and dry  - Evaluate need for skin moisturizer/barrier cream  - Collaborate with interdisciplinary team   - Patient/family teaching  - Consider wound care consult   Outcome: Progressing

## 2023-09-19 NOTE — PROGRESS NOTES
Phone conversation with his mother Cierra Browne with permission of the patient  Patient stated that all his problems started 3 days ago, he was having nausea vomiting diarrhea for 3 days did not feel well, he stated he did not shave or did not take care of himself. Confusion started yesterday, got some medical history in his yard called the police ambulance came and he was brought to the hospital.     He thinks he knows that he likely occasionally takes Valium for sleep, he had a history of taking antibiotics over the last couple months for ear problems and fevers, he follows with her primary care doctor Dr. Emory Evangelista. He had shingles before. 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    Family history significant for Parkinson's disease  No history of psychotic episode, schizophrenia

## 2023-09-19 NOTE — NURSING NOTE
Patient noted to be having a generalized seizure, non verbal, neck hyperextended, arms with rigid movements, eyes rolled in back of head, cyanosis to lips, sats 90%, placed on NRM, ativan 2mg IV given. Seizure lasted about 1 minute, patient then looked anxious and gradually within 10 minutes was following commands, moving all extremities with equal strength, IAIN. Is confused to situation , place , oriented to time. Oxygen placed at 2L nasal cannula for tachypnea. Post seizure was restless and trying to get OOB, stayed 1:1 with patient. Loading dose of 1500mg keppra x 2 doses mixed by supervisor and given.

## 2023-09-19 NOTE — ASSESSMENT & PLAN NOTE
Lab Results   Component Value Date    HGBA1C 12.3 (H) 08/19/2021       Recent Labs     09/18/23  1246 09/18/23 1952   POCGLU 283* 189*       Blood Sugar Average: Last 72 hrs:  (P) 236     Insulin sliding scale

## 2023-09-20 ENCOUNTER — APPOINTMENT (OUTPATIENT)
Dept: RADIOLOGY | Facility: HOSPITAL | Age: 43
End: 2023-09-20
Payer: COMMERCIAL

## 2023-09-20 PROBLEM — R22.0: Status: ACTIVE | Noted: 2023-09-20

## 2023-09-20 LAB
ALBUMIN SERPL BCP-MCNC: 3.6 G/DL (ref 3.5–5)
ALP SERPL-CCNC: 103 U/L (ref 34–104)
ALT SERPL W P-5'-P-CCNC: 29 U/L (ref 7–52)
ANION GAP SERPL CALCULATED.3IONS-SCNC: 11 MMOL/L
AST SERPL W P-5'-P-CCNC: 59 U/L (ref 13–39)
ATRIAL RATE: 92 BPM
BILIRUB SERPL-MCNC: 2.1 MG/DL (ref 0.2–1)
BUN SERPL-MCNC: 7 MG/DL (ref 5–25)
CA-I BLD-SCNC: 1.07 MMOL/L (ref 1.12–1.32)
CALCIUM SERPL-MCNC: 8.1 MG/DL (ref 8.4–10.2)
CHLORIDE SERPL-SCNC: 101 MMOL/L (ref 96–108)
CO2 SERPL-SCNC: 24 MMOL/L (ref 21–32)
CREAT SERPL-MCNC: 0.64 MG/DL (ref 0.6–1.3)
ERYTHROCYTE [DISTWIDTH] IN BLOOD BY AUTOMATED COUNT: 12.9 % (ref 11.6–15.1)
GFR SERPL CREATININE-BSD FRML MDRD: 119 ML/MIN/1.73SQ M
GLUCOSE SERPL-MCNC: 116 MG/DL (ref 65–140)
GLUCOSE SERPL-MCNC: 117 MG/DL (ref 65–140)
GLUCOSE SERPL-MCNC: 137 MG/DL (ref 65–140)
GLUCOSE SERPL-MCNC: 158 MG/DL (ref 65–140)
GLUCOSE SERPL-MCNC: 161 MG/DL (ref 65–140)
GLUCOSE SERPL-MCNC: 177 MG/DL (ref 65–140)
HCT VFR BLD AUTO: 36.1 % (ref 36.5–49.3)
HGB BLD-MCNC: 12.6 G/DL (ref 12–17)
MAGNESIUM SERPL-MCNC: 1.7 MG/DL (ref 1.9–2.7)
MCH RBC QN AUTO: 34.8 PG (ref 26.8–34.3)
MCHC RBC AUTO-ENTMCNC: 34.9 G/DL (ref 31.4–37.4)
MCV RBC AUTO: 100 FL (ref 82–98)
P AXIS: 42 DEGREES
PLATELET # BLD AUTO: 77 THOUSANDS/UL (ref 149–390)
PMV BLD AUTO: 11.1 FL (ref 8.9–12.7)
POTASSIUM SERPL-SCNC: 3.7 MMOL/L (ref 3.5–5.3)
PR INTERVAL: 142 MS
PROT SERPL-MCNC: 7.1 G/DL (ref 6.4–8.4)
QRS AXIS: 115 DEGREES
QRSD INTERVAL: 108 MS
QT INTERVAL: 390 MS
QTC INTERVAL: 482 MS
RBC # BLD AUTO: 3.62 MILLION/UL (ref 3.88–5.62)
SODIUM SERPL-SCNC: 136 MMOL/L (ref 135–147)
T WAVE AXIS: 77 DEGREES
VENTRICULAR RATE: 92 BPM
WBC # BLD AUTO: 10.92 THOUSAND/UL (ref 4.31–10.16)

## 2023-09-20 PROCEDURE — 99254 IP/OBS CNSLTJ NEW/EST MOD 60: CPT | Performed by: PSYCHIATRY & NEUROLOGY

## 2023-09-20 PROCEDURE — 99233 SBSQ HOSP IP/OBS HIGH 50: CPT | Performed by: STUDENT IN AN ORGANIZED HEALTH CARE EDUCATION/TRAINING PROGRAM

## 2023-09-20 PROCEDURE — 80053 COMPREHEN METABOLIC PANEL: CPT | Performed by: NURSE PRACTITIONER

## 2023-09-20 PROCEDURE — 70553 MRI BRAIN STEM W/O & W/DYE: CPT

## 2023-09-20 PROCEDURE — 85027 COMPLETE CBC AUTOMATED: CPT | Performed by: NURSE PRACTITIONER

## 2023-09-20 PROCEDURE — 83735 ASSAY OF MAGNESIUM: CPT | Performed by: NURSE PRACTITIONER

## 2023-09-20 PROCEDURE — A9585 GADOBUTROL INJECTION: HCPCS | Performed by: STUDENT IN AN ORGANIZED HEALTH CARE EDUCATION/TRAINING PROGRAM

## 2023-09-20 PROCEDURE — 82330 ASSAY OF CALCIUM: CPT | Performed by: NURSE PRACTITIONER

## 2023-09-20 PROCEDURE — 82948 REAGENT STRIP/BLOOD GLUCOSE: CPT

## 2023-09-20 PROCEDURE — C9113 INJ PANTOPRAZOLE SODIUM, VIA: HCPCS | Performed by: NURSE PRACTITIONER

## 2023-09-20 RX ORDER — GADOBUTROL 604.72 MG/ML
10 INJECTION INTRAVENOUS
Status: COMPLETED | OUTPATIENT
Start: 2023-09-20 | End: 2023-09-20

## 2023-09-20 RX ORDER — PANTOPRAZOLE SODIUM 40 MG/1
40 TABLET, DELAYED RELEASE ORAL
Status: DISCONTINUED | OUTPATIENT
Start: 2023-09-20 | End: 2023-09-22 | Stop reason: HOSPADM

## 2023-09-20 RX ORDER — MAGNESIUM SULFATE HEPTAHYDRATE 40 MG/ML
2 INJECTION, SOLUTION INTRAVENOUS ONCE
Status: COMPLETED | OUTPATIENT
Start: 2023-09-20 | End: 2023-09-20

## 2023-09-20 RX ADMIN — CEFTRIAXONE SODIUM 2000 MG: 10 INJECTION, POWDER, FOR SOLUTION INTRAVENOUS at 12:30

## 2023-09-20 RX ADMIN — PANTOPRAZOLE SODIUM 40 MG: 40 INJECTION, POWDER, FOR SOLUTION INTRAVENOUS at 05:03

## 2023-09-20 RX ADMIN — HEPARIN SODIUM 5000 UNITS: 5000 INJECTION INTRAVENOUS; SUBCUTANEOUS at 05:03

## 2023-09-20 RX ADMIN — THIAMINE HYDROCHLORIDE 500 MG: 100 INJECTION, SOLUTION INTRAMUSCULAR; INTRAVENOUS at 15:19

## 2023-09-20 RX ADMIN — VANCOMYCIN HYDROCHLORIDE 1500 MG: 10 INJECTION, POWDER, LYOPHILIZED, FOR SOLUTION INTRAVENOUS at 13:23

## 2023-09-20 RX ADMIN — SODIUM CHLORIDE, SODIUM GLUCONATE, SODIUM ACETATE, POTASSIUM CHLORIDE, MAGNESIUM CHLORIDE, SODIUM PHOSPHATE, DIBASIC, AND POTASSIUM PHOSPHATE 125 ML/HR: .53; .5; .37; .037; .03; .012; .00082 INJECTION, SOLUTION INTRAVENOUS at 15:45

## 2023-09-20 RX ADMIN — HEPARIN SODIUM 5000 UNITS: 5000 INJECTION INTRAVENOUS; SUBCUTANEOUS at 21:13

## 2023-09-20 RX ADMIN — HEPARIN SODIUM 5000 UNITS: 5000 INJECTION INTRAVENOUS; SUBCUTANEOUS at 13:42

## 2023-09-20 RX ADMIN — INSULIN LISPRO 1 UNITS: 100 INJECTION, SOLUTION INTRAVENOUS; SUBCUTANEOUS at 21:13

## 2023-09-20 RX ADMIN — VANCOMYCIN HYDROCHLORIDE 1500 MG: 10 INJECTION, POWDER, LYOPHILIZED, FOR SOLUTION INTRAVENOUS at 00:57

## 2023-09-20 RX ADMIN — Medication 1 TABLET: at 08:02

## 2023-09-20 RX ADMIN — INSULIN LISPRO 1 UNITS: 100 INJECTION, SOLUTION INTRAVENOUS; SUBCUTANEOUS at 11:31

## 2023-09-20 RX ADMIN — PANTOPRAZOLE SODIUM 40 MG: 40 TABLET, DELAYED RELEASE ORAL at 08:02

## 2023-09-20 RX ADMIN — MAGNESIUM SULFATE HEPTAHYDRATE 2 G: 40 INJECTION, SOLUTION INTRAVENOUS at 09:17

## 2023-09-20 RX ADMIN — GADOBUTROL 10 ML: 604.72 INJECTION INTRAVENOUS at 11:06

## 2023-09-20 RX ADMIN — THIAMINE HYDROCHLORIDE 500 MG: 100 INJECTION, SOLUTION INTRAMUSCULAR; INTRAVENOUS at 05:02

## 2023-09-20 RX ADMIN — CEFTRIAXONE SODIUM 2000 MG: 10 INJECTION, POWDER, FOR SOLUTION INTRAVENOUS at 01:05

## 2023-09-20 RX ADMIN — THIAMINE HYDROCHLORIDE 500 MG: 100 INJECTION, SOLUTION INTRAMUSCULAR; INTRAVENOUS at 21:15

## 2023-09-20 NOTE — PROGRESS NOTES
Elba Xiong is a 38 y.o. male who is currently ordered Vancomycin IV with management by the Pharmacy Consult service. Relevant clinical data and objective / subjective history reviewed. Vancomycin Assessment:  Indication and Goal AUC/Trough: CNS infection (goal -600, trough 15-20)  Clinical Status: improving  Micro: in-process  Renal Function:  SCr: 0.64 mg/dL  CrCl: 174.3 mL/min  Renal replacement: Not on dialysis  Days of Therapy: 2  Current Dose: Vancomycin 1500 mg IV q 12 h   Vancomycin Plan:  New Dosing:continue vanco 1500 mg IV q 12 hours  Estimated AUC: 557 mcg/hr/mL  Estimated Trough: 16.9 mcg/mL  Next Level: 09/26/2023 @0600  Renal Function Monitoring: Daily BMP and [place] will continue to follow closely for s/sx of nephrotoxicity, infusion reactions and appropriateness of therapy. BMP and CBC will be ordered per protocol. We will continue to follow the patient’s culture results and clinical progress daily.     Mikki Garces, Pharmacist

## 2023-09-20 NOTE — ASSESSMENT & PLAN NOTE
· Patient with history of alcohol use disorder, states he has been without drinking for about 3 days prior to admission because he had some form of diarrheal illness  · Around that time he began to note visual hallucinations including seeing people in his backyard cutting down his trees. He is he called the police about this and he possibly hallucinated more police personnel walking around his yard and then those who actually showed up  · While in the hospital patient developed seizure-like activity and was transferred for further monitoring  · Question if this may have been an alcohol withdrawal seizure vs encephalitis?     · Consult neurology  · LP completed and results pending  · MRI without significant structural abnormalities at this time  · Continue broad-spectrum antibiotics  · Patient alert and oriented x3 at bedside today, denies any hallucinations or abnormal phenomena in room

## 2023-09-20 NOTE — PLAN OF CARE
Problem: NEUROSENSORY - ADULT  Goal: Achieves stable or improved neurological status  Description: INTERVENTIONS  - Monitor and report changes in neurological status  - Monitor vital signs such as temperature, blood pressure, glucose, and any other labs ordered   - Initiate measures to prevent increased intracranial pressure  - Monitor for seizure activity and implement precautions if appropriate      Outcome: Progressing     Problem: NEUROSENSORY - ADULT  Goal: Remains free of injury related to seizures activity  Description: INTERVENTIONS  - Maintain airway, patient safety  and administer oxygen as ordered  - Monitor patient for seizure activity, document and report duration and description of seizure to physician/advanced practitioner  - If seizure occurs,  ensure patient safety during seizure  - Reorient patient post seizure  - Seizure pads on all 4 side rails  - Instruct patient/family to notify RN of any seizure activity including if an aura is experienced  - Instruct patient/family to call for assistance with activity based on nursing assessment  - Administer anti-seizure medications if ordered    Outcome: Progressing     Problem: NEUROSENSORY - ADULT  Goal: Achieves maximal functionality and self care  Description: INTERVENTIONS  - Monitor swallowing and airway patency with patient fatigue and changes in neurological status  - Encourage and assist patient to increase activity and self care.    - Encourage visually impaired, hearing impaired and aphasic patients to use assistive/communication devices  Outcome: Progressing     Problem: GENITOURINARY - ADULT  Goal: Maintains or returns to baseline urinary function  Description: INTERVENTIONS:  - Assess urinary function  - Encourage oral fluids to ensure adequate hydration if ordered  - Administer IV fluids as ordered to ensure adequate hydration  - Administer ordered medications as needed  - Offer frequent toileting  - Follow urinary retention protocol if ordered  Outcome: Progressing     Problem: GENITOURINARY - ADULT  Goal: Absence of urinary retention  Description: INTERVENTIONS:  - Assess patient’s ability to void and empty bladder  - Monitor I/O  - Bladder scan as needed  - Discuss with physician/AP medications to alleviate retention as needed  - Discuss catheterization for long term situations as appropriate  Outcome: Progressing     Problem: METABOLIC, FLUID AND ELECTROLYTES - ADULT  Goal: Electrolytes maintained within normal limits  Description: INTERVENTIONS:  - Monitor labs and assess patient for signs and symptoms of electrolyte imbalances  - Administer electrolyte replacement as ordered  - Monitor response to electrolyte replacements, including repeat lab results as appropriate  - Instruct patient on fluid and nutrition as appropriate  Outcome: Progressing     Problem: METABOLIC, FLUID AND ELECTROLYTES - ADULT  Goal: Fluid balance maintained  Description: INTERVENTIONS:  - Monitor labs   - Monitor I/O and WT  - Instruct patient on fluid and nutrition as appropriate  - Assess for signs & symptoms of volume excess or deficit  Outcome: Progressing    Problem: PAIN - ADULT  Goal: Verbalizes/displays adequate comfort level or baseline comfort level  Description: Interventions:  - Encourage patient to monitor pain and request assistance  - Assess pain using appropriate pain scale  - Administer analgesics based on type and severity of pain and evaluate response  - Implement non-pharmacological measures as appropriate and evaluate response  - Consider cultural and social influences on pain and pain management  - Notify physician/advanced practitioner if interventions unsuccessful or patient reports new pain  Outcome: Progressing

## 2023-09-20 NOTE — ASSESSMENT & PLAN NOTE
· Patient feels that he has bilateral buccal mucosa swelling, possibly right greater than left  · No significant pathology noted on imaging of the portions of oral mucosa noted on CT/MRI  · Patient feels that it may actually be improving today, but just more noticeable because he is eating more  · Warm compress ordered, if symptoms failing to improve by tomorrow, consider dedicated maxillofacial imaging

## 2023-09-20 NOTE — CONSULTS
Consultation - Neurology   Matt Alcala 38 y.o. male MRN: [id number]  Unit/Bed#: [facility] 717-01 Encounter: [id number]      Assessment/Plan     Seizure-like activity [facility])  Assessment & Plan  40-year-old male with diabetes, hypertension, alcohol abuse, who [facility] AT [place] on 9/18 with complaints of generalized weakness, nausea, diarrhea, diaphoresis, lightheadedness, hallucinations, poor p.o. intake, and not taking his medications secondary to vomiting x2 days. There was concerns for tumefactive sludge on right upper quadrant ultrasound. Patient was started on IV antibiotics and was planned to undergo MRCP. Rapid response was later initiated for altered mental status. He was subsequently transferred to the critical care unit for further evaluation and noted to have another episode of seizure-like activity later that night. Episode resolved after Ativan 2 mg IV and patient was noted to be confused after the episode. Patient was loaded with Keppra 3 g IV and subsequently transferred to [facility] critical care unit for further evaluation. Upon arrival to [facility], patient was reported to be hallucinating and tremulous and received multiple doses of Ativan along with ketamine and phenobarbital.  LP was performed with CSF studies obtained, the results noted below. Unclear etiology at this time. Per discussion with attending neurologist, suspect seizures may possibly be secondary to alcohol withdrawal. No further episodes noted since transferred to [facility]. Plan:  - S/p LP on 9/19  - CSF studies pending: West Nile, culture and Gram stain, fungal culture, Lyme  - Labs pending: Blood cultures, West Nile, C. difficile, HSV PCR  - Currently on ceftriaxone and Vanco.  Acyclovir DC'd. - S/p Keppra 3 g x 1.  Will discuss need for maintenance AED with attending neurologist.  - Continue thiamine repletion per primary team  - WA protocol  - Seizure precautions  - Ativan as needed for seizure activity > 2 minutes  - [employer] form to be completed pending further work up.  - PT/OT  - Monitor neuro exam; notify with any changes  - Medical management and supportive care per primary team. Correction of any metabolic or infectious disturbances. Results:   - CT head: No acute intracranial abnormality.  - MRI brain w/wo contrast: No acute intracranial pathology. - Labs: WBC on presentation 16.47, sodium on presentation 130, glucose on presentation 314, AST 65, alk phos 153, total bilirubin 2.82, COVID/flu/RSV negative, TSH 3.333, A1c 7.1, folate > 22.3, B12 818, UA negative, UDS positive for benzodiazepines, Lyme negative, troponin 66, hepatitis panel negative  - CSF studies: Glucose 120, protein 65, WBC 1, RBC 17, Gram stain negative, ME panel negative, cryptococcal negative, HSV negative    Nausea, vomiting and diarrhea  Assessment & Plan  - Patient reports that prior to presentation, for 2 days, he had nausea, vomiting, diarrhea, generalized weakness, poor p.o. intake, and poor sleep. - Right upper quadrant ultrasound:  1. Large amount of echogenic material nearly filling the gallbladder lumen presumably representing tumefactive sludge, without findings suspicious for a solid lesion on recent MRI abdomen. No evidence of acute cholecystitis. 2.  Mild hepatomegaly and steatosis. - MRI abdomen/MRCP:  1. Dependent sludge within the gallbladder without suspicious focal lesion on this noncontrast MRI examination. .  2. Trace volume perihepatic ascites of indeterminate etiology. - Currently on ceftriaxone and vanco  - CDiff pending  - Medical management per primary team      Bernie Arnett will need follow up in in 6 weeks with epilepsy attending or advance practitioner. He will not require outpatient neurological testing. Case and treatment plan reviewed with attending neurologist, Dr. Sharie Burkitt. Please see attending attestation for any further recommendations.       History of Present Illness     Reason for Consult / Principal Problem: Seizure-like activity  HPI: Jossy Bailey is a 38 y.o.  male with diabetes, hypertension, alcohol abuse, who presents with symptoms as noted below. Patient seen and evaluated at the bedside. He states that two days prior to presentation, he was having nausea, vomiting, diarrhea. Secondary to that, he was having generalized weakness, poor sleep and poor p.o. intake. He states that on Sunday, he was feeling better from his GI symptoms but he was unable to go to bed until 3 AM Monday. He states that he woke up an hour later and thought he saw someone cutting the trees in his backyard. After that, he states that he went to [facility] and also spoke with his mother on the phone, who told him that he should call the police. He states when he came home from [facility], he thought he saw the  talking to his neighbor. The police eventually showed up to his house and patient reports that they did not believe that someone was cutting down trees in his yard. Patient states that he saw tire marks and a pile of wood in his backyard and told the officers to speak with his neighbor. Officers did speak with patient's neighbor, who denies seeing any of this or speaking with a  prior to that. Due to this event, EMS brought patient to the hospital to be further evaluated. Patient states that he recalls being stuck multiple times for lab work, but he does not recall any seizure events. He states that he just remembers waking up at this hospital and was told that he had seizures twice. Currently, he endorses some right-sided jaw pain and intermittent left lower extremity weakness/numbness, which he notes has been ongoing for approximately 3 years now. He denies any headache, dizziness/lightheadedness, vision changes, or any other pain.   He was recently laid off from his job and due to the limits of his insurance, he states that his PCP had him undergo several tests and started him on several medications approximately 1 month ago. He mainly lives at home but does have his 2 daughters half-time there. Prior to being ill over the weekend, patient states that he was eating well and sleeping without issues. He endorses occasional alcohol use, reporting that he drinks beers approximately 3-4 times a week. He denies any tobacco or recreational drug use. He denies any personal or familial history of seizure or stroke. He does have a history of Parkinson's in his father. He denies tongue bite or any further GI symptoms. Patient initially presented [facility] AT [place] on 9/18 with generalized weakness, nausea, diarrhea x2 days. Patient also endorsed diaphoresis, lightheadedness, and hallucinations along with poor p.o. intake, poor sleep, and not taking his medications secondary to vomiting. On presentation, labs included: WBC 16.47, sodium 130, glucose 314, elevated LFTs. Imaging demonstrated concerns for tumefactive sludge on right upper quadrant ultrasound. Patient was subsequently started on IV antibiotics. Plan was to undergo MRCP. Rapid response was later initiated for AMS and seizure-like activity. Patient was unable to remember events that his mother was at bedside all day with him and was having difficulty with memory. Patient's mother reported that patient called the police prior to presentation because he thought someone cut his trees down at home. During the RR, patient was noted to be awake but confused. He was subsequently transferred to the critical care unit and MRI brain and LP were recommended for further work-up. Of note, patient does endorse daily alcohol use at home. Patient was started on acyclovir. Again, later that evening, patient exhibited another episode of seizure-like activity, that consisted of "generalized seizure, nonverbal, neck hyperextended, arms with rigid movements, eyes rolled back and head, cyanosis to lips".   Ativan 2 mg IV was given and episode lasted approximately 1 minute prior to resolving. Following the episode, patient appeared anxious today and then started gradually improving with following commands and moving all extremities after 10 minutes, although, he remained confused. He received a total of Keppra 3 g IV. He was subsequently transferred to \[facility]"" critical care unit for further evaluation. Upon arrival, patient was reported to be hallucinating and received multiple doses of Ativan along with ketamine and phenobarbital.  Unfortunately his tremors or inadequately controlled, therefore, he was given an additional dose of phenobarbital x2. It was suspected that seizures may have been in the setting of alcohol withdrawal as patient does have a drinking history of daily alcohol use per family. He was started on a Precedex drip for sedation. LP was performed with CSF studies obtained. He was subsequently taken off of acyclovir as HSV was unremarkable. Inpatient consult to Neurology  Consult performed by: ADELITA Eduardo  Consult ordered by: Christine Ortega          Review of Systems   A 12 system ROS was completed. Other than the above mentioned complaints in the HPI and those commented on below, all remaining systems were negative.     Historical Information   Past Medical History:   Diagnosis Date   • Allergic    • Diabetes mellitus ([address])    • Gout    • Hypertension    • Psoriasis    • Sinus infection      Past Surgical History:   Procedure Laterality Date   • DENTAL SURGERY     • KNEE SURGERY Left    • NOSE SURGERY     • WRIST FRACTURE SURGERY       Social History   Social History     Substance and Sexual Activity   Alcohol Use Yes    Comment: socially      Social History     Substance and Sexual Activity   Drug Use Never     E-Cigarette/Vaping   • E-Cigarette Use Never User      E-Cigarette/Vaping Substances     Social History     Tobacco Use   Smoking Status Former   • Types: Cigarettes   • Quit date:    • Years since quittin.7 Smokeless Tobacco Former   • Types: Chew     Family History:   Family History   Problem Relation Age of Onset   • Heart disease Mother    • Diabetes Mother    • Parkinsonism Father    • Heart disease Father        Review of previous medical records was completed. Meds/Allergies   all current active meds have been reviewed, current meds:   Current Facility-Administered Medications   Medication Dose Route Frequency   • acetaminophen (TYLENOL) tablet 650 mg  650 mg Oral Q6H PRN   • calcium carbonate (TUMS) chewable tablet 1,000 mg  1,000 mg Oral Daily PRN   • cefTRIAXone (ROCEPHIN) 2,000 mg in dextrose 5 % 50 mL IVPB  2,000 mg Intravenous Q12H   • heparin (porcine) subcutaneous injection 5,000 Units  5,000 Units Subcutaneous Q8H [address]   • hydrALAZINE (APRESOLINE) injection 5 mg  5 mg Intravenous Q6H PRN   • insulin lispro (HumaLOG) 100 units/mL subcutaneous injection 1-6 Units  1-6 Units Subcutaneous TID AC   • insulin lispro (HumaLOG) 100 units/mL subcutaneous injection 1-6 Units  1-6 Units Subcutaneous HS   • lidocaine (PF) (XYLOCAINE-MPF) 1 % injection 10 mL  10 mL Infiltration Once   • multi-electrolyte (PLASMALYTE-A/ISOLYTE-S PH 7.4) IV solution  125 mL/hr Intravenous Continuous   • multivitamin-minerals (CENTRUM) tablet 1 tablet  1 tablet Oral Daily   • ondansetron (ZOFRAN) injection 4 mg  4 mg Intravenous Q6H PRN   • pantoprazole (PROTONIX) EC tablet 40 mg  40 mg Oral Early Morning   • simethicone (MYLICON) chewable tablet 80 mg  80 mg Oral 4x Daily PRN   • thiamine (VITAMIN B1) 500 mg in sodium chloride 0.9 % 50 mL IVPB  500 mg Intravenous Q8H   • vancomycin (VANCOCIN) 1500 mg in sodium chloride 0.9% 250 mL IVPB  1,500 mg Intravenous Q12H    and PTA meds:   Prior to Admission Medications   Prescriptions Last Dose Informant Patient Reported? Taking?    Cinnamon 500 MG TABS  Self Yes No   Sig: Take 1 tablet by mouth daily   Crisaborole (Eucrisa) 2 % OINT   Yes No   Sig: Apply topically   Patient not taking: Reported on 6/8/2022   Multiple Vitamins-Minerals (MULTIVITAMIN WITH MINERALS) tablet  Self Yes No   Sig: Take 1 tablet by mouth daily   Potassium 75 MG TABS   Yes No   Sig: Take 75 mg by mouth daily   allopurinol (ZYLOPRIM) 300 mg tablet  Self Yes No   Sig: Take 300 mg by mouth daily   glipiZIDE (GLUCOTROL) 5 mg tablet   Yes No   Sig: Take 5 mg by mouth daily   glucosamine-chondroitin 500-400 MG tablet   Yes No   Sig: Take 1 tablet by mouth 3 (three) times a day   hydrocortisone 2.5 % cream   Yes No   Sig: APPLY TWICE A DAY AS NEEDED FOR PSORIASIS ON FACE. USE SPARINGLY   lisinopril (ZESTRIL) 5 mg tablet   Yes No   Sig: TAKE 1 TABLET BY MOUTH EVERY DAY FOR ELEVATED BLOOD PRESSURE   nystatin-triamcinolone (MYCOLOG-II) cream   Yes No   Sig: Apply topically Three times a day   saccharomyces boulardii (FLORASTOR) 250 mg capsule   Yes No   Sig: Take 250 mg by mouth 2 (two) times a day   valACYclovir (VALTREX) 1,000 mg tablet   No No   Sig: Take 1 tablet (1,000 mg total) by mouth 3 (three) times a day for 7 days   Patient not taking: Reported on 9/18/2023      Facility-Administered Medications: None       Allergies   Allergen Reactions   • Kiwi Extract - Food Allergy Tongue Swelling   • Mixed Ragweed Other (See Comments)       Objective   Vitals:Blood pressure 146/97, pulse 83, temperature 99.1 °F (37.3 °C), resp. rate 19, height 6' 3" (1.905 m), weight 97.4 kg (214 lb 11.7 oz), SpO2 96 %. ,Body mass index is 26.84 kg/m². Intake/Output Summary (Last 24 hours) at 9/20/2023 1637  Last data filed at 9/20/2023 1544  Gross per 24 hour   Intake 256.29 ml   Output 1850 ml   Net -1593.71 ml       Invasive Devices:    Invasive Devices     Peripheral Intravenous Line  Duration           Peripheral IV 09/18/23 Left Antecubital 2 days    Peripheral IV 09/18/23 Distal;Right;Upper;Ventral (anterior) Arm 1 day    Peripheral IV 09/19/23 Proximal;Right;Ventral (anterior) Forearm 1 day                Physical Exam  Vitals and nursing note reviewed. Constitutional:       General: He is not in acute distress. Appearance: Normal appearance. He is not ill-appearing. HENT:      Head: Normocephalic. Mouth/Throat:      Mouth: Mucous membranes are moist.      Pharynx: Oropharynx is clear. Eyes:      General: No scleral icterus. Right eye: No discharge. Left eye: No discharge. Extraocular Movements: Extraocular movements intact and EOM normal.      Conjunctiva/sclera: Conjunctivae normal.   Cardiovascular:      Rate and Rhythm: Normal rate. Pulmonary:      Effort: Pulmonary effort is normal. No respiratory distress. Musculoskeletal:         General: Normal range of motion. Cervical back: Normal range of motion. Skin:     General: Skin is warm and dry. Coloration: Skin is not jaundiced or pale. Neurological:      Mental Status: He is alert and oriented to person, place, and time. Coordination: Finger-Nose-Finger Test normal.   Psychiatric:         Mood and Affect: Mood normal.         Behavior: Behavior normal.       Neurologic Exam     Mental Status   Oriented to person, place, and time. Level of consciousness: alert  Able to follow commands appropriately. No aphasia or dysarthria noted. Cranial Nerves     CN II   Right visual field deficit: none  Left visual field deficit: none     CN III, IV, VI   Extraocular motions are normal.     CN V   Facial sensation intact. CN VII   Facial expression full, symmetric.      CN VIII   Hearing: intact    CN IX, X   Palate: symmetric    CN XI   CN XI normal.     CN XII   CN XII normal.     Motor Exam   Muscle bulk: normal  Right arm pronator drift: absent  Left arm pronator drift: absent  Bilateral UE strength 5/5 deltoids, biceps, triceps, hand   Bilateral LE strength 5/5 hip flexion, dorsiflexion, plantar flexion     Sensory Exam   Light touch normal.     Gait, Coordination, and Reflexes     Coordination   Finger to nose coordination: normal    Tremor   Resting tremor: absent  Intention tremor: present      Lab Results:   I have personally reviewed pertinent reports. , CBC:   Results from last 7 days   Lab Units 09/20/23  0513 09/19/23  0451 09/18/23  2204   WBC Thousand/uL 10.92* 15.43* 14.18*   RBC Million/uL 3.62* 3.81* 3.90   HEMOGLOBIN g/dL 12.6 13.1 13.2   HEMATOCRIT % 36.1* 37.6 38.6   MCV fL 100* 99* 99*   PLATELETS Thousands/uL 77* 82* 79*   , BMP/CMP:   Results from last 7 days   Lab Units 09/20/23  0456 09/19/23  0441 09/18/23  2204   SODIUM mmol/L 136 137 134*   POTASSIUM mmol/L 3.7 3.7 4.0   CHLORIDE mmol/L 101 102 100   CO2 mmol/L 24 24 26   BUN mg/dL 7 9 10   CREATININE mg/dL 0.64 0.81 0.76   CALCIUM mg/dL 8.1* 8.4 8.9   AST U/L 59* 52* 52*   ALT U/L 29 27 28   ALK PHOS U/L 103 126* 112*   EGFR ml/min/1.73sq m 119 108 110   , Vitamin B12:   Results from last 7 days   Lab Units 09/18/23  1313   VITAMIN B 12 pg/mL 818   , HgBA1C:   Results from last 7 days   Lab Units 09/18/23  1313   HEMOGLOBIN A1C % 7.1*   , TSH:   Results from last 7 days   Lab Units 09/18/23  1313   TSH 3RD GENERATON uIU/mL 3.333   , Coagulation:   , Lipid Profile:   Results from last 7 days   Lab Units 09/19/23  0451   TRIGLYCERIDES mg/dL 79   , Ammonia:   , Urinalysis:   Results from last 7 days   Lab Units 09/18/23  1320   COLOR UA  [place]   CLARITY UA  Clear   SPEC GRAV UA  1.010   PH UA  6.0   LEUKOCYTES UA  Negative   NITRITE UA  Negative   GLUCOSE UA mg/dl 500 (1/2%)*   KETONES UA mg/dl Trace*   BILIRUBIN UA  Small*   BLOOD UA  Negative   , Drug Screen:   Results from last 7 days   Lab Units 09/18/23  1320   BARBITURATE UR  Negative   BENZODIAZEPINE UR  Positive*   THC UR  Negative   COCAINE UR  Negative   METHADONE URINE  Negative   OPIATE UR  Negative   PCP UR  Negative   , Medication Drug Levels:       Invalid input(s): "CARBAMAZEPINE", "LACOSAMIDE", "OXCARBAZEPINE"     Imaging Studies: I have personally reviewed pertinent reports.    and I have personally reviewed pertinent films in PACS  EKG, Pathology, and Other Studies: I have personally reviewed pertinent reports.     VTE Prophylaxis: Sequential compression device (Venodyne)  and Heparin    Code Status: Level 1 - Full Code  Advance Directive and Living Will:      Power of :    POLST:

## 2023-09-20 NOTE — UTILIZATION REVIEW
Initial Clinical Review    Admission: Date/Time/Statement:   Admission Orders (From admission, onward)     Ordered        09/19/23 0434  Inpatient Admission  Once                      Orders Placed This Encounter   Procedures   • Inpatient Admission     Standing Status:   Standing     Number of Occurrences:   1     Order Specific Question:   Level of Care     Answer:   Critical Care [15]     Order Specific Question:   Estimated length of stay     Answer:   More than 2 Midnights     Order Specific Question:   Certification     Answer:   I certify that inpatient services are medically necessary for this patient for a duration of greater than two midnights. See H&P and MD Progress Notes for additional information about the patient's course of treatment. ED Arrival Information     Patient not seen in ED                     No chief complaint on file. Initial Presentation: 38 y.o. male w/ PMH of DM, HTN was transferred from [facility] to [facility] for a higher level of care. Pt initially presented to [address] [place] [address] on 09/18 w/ nausea, vomiting, diarrhea, lightheadedness and hallucinations. Reports short term memory loss w/c he had also 2 wks ago. He was found w/ gallbladder sludge, s/p MRCP w/c did not demonstrate acute cholecystitis. During admission, he was a rapid response 2/2 seizure like activity, received ativan 2mg and keppra 1500mg with resolution but found to be confused. Empiric acyclovir, ceftriaxone and vancomycin was given for possible CNS infection. He had another episode today at [address] [place] [address] ICU which seemed like a generalized tonic clonic seizure, given ativan and loaded w/ Keppra. Transferred to [facility] neuro ICU for further management. On arrival at [facility], pt waxing and waning MS, disoriented. Per family, pt drinks 3-4 beers daily. He was given ketamine 50mg and then phenobarbital 260mg for attempt LP but could not be done due to continued agitation and tremors.     Admitted as Inpatient for New onset seizure activity, acute encephalopathy, sepsis- concern for possible meningitis/encephalitis, hyponatremia, alc w/drawal.  Plan: cont Keppra 1 g bid. Ativan prn. Consider vEEG. Seizure precautions. attempt LP again. MRI ordered. continue thiamine 100mg daily. Delirium precautions. CIWA protocol. Neuro checks. Continue acyclovir, vancomycin, and ceftriaxone. NPO for now, consider diet when stable. IVF. Cont to trend Na. Date: 09/20  Day 2:    Neurology Notes: Seizure like activity: MRI brain w/wo contrast: No acute intracranial pathology. Plan: CSF studies pending: West Nile, culture and Gram stain, fungal culture, Lyme. Labs pending: Blood cultures, West Nile, C. difficile, HSV PCR. On IV Ceftriaxone and Vanco. Continue thiamine repletion. CIWA protocol. Seizure precautions. Ativan as needed for seizure activity > 2 minutes. PT/OT. Monitor neuro exam.    IM Notes: Pt reports pain in b/l parotid area, mildly improving from yesterday. AAOx3 today, denies hallucinations, b/l buccal mucosa swelling present. Warm compress ordered, if symptoms failing to improve by tomorrow, consider dedicated maxillofacial imaging. Cont broad spectrum abx. Zofran prn.      ED Triage Vitals [09/19/23 0440]   Temperature Pulse Respirations Blood Pressure SpO2   100.1 °F (37.8 °C) (!) 108 (!) 26 138/86 96 %      Temp Source Heart Rate Source Patient Position - Orthostatic VS BP Location FiO2 (%)   Oral Monitor Lying Right arm --      Pain Score       No Pain          Wt Readings from Last 1 Encounters:   09/19/23 97.4 kg (214 lb 11.7 oz)     Additional Vital Signs:   09/20/23 0800 -- -- -- -- -- -- -- -- None (Room air) --   09/19/23 2115 98.7 °F (37.1 °C) 83 19 133/87 106 94 % -- -- None (Room air) Sitting   09/19/23 2030 -- -- -- -- -- -- -- -- None (Room air) --   09/19/23 1800 -- 70 13 141/94 109 95 % -- -- -- --   09/19/23 1700 -- 70 13 151/90 113 95 % -- -- -- --   09/19/23 1600 99.3 °F (37.4 °C) 72 13 141/93 115 91 % -- -- -- -- 09/19/23 1500 -- 74 13 131/86 109 91 % -- -- -- --   09/19/23 1400 -- 72 12 111/74 86 95 % -- -- -- --   09/19/23 1300 -- 74 17 114/85 93 96 % -- -- -- --   09/19/23 1200 98.9 °F (37.2 °C) 74 17 115/78 91 97 % -- -- -- --   09/19/23 1100 -- 78 16 117/79 90 99 % -- -- -- --   09/19/23 1000 -- 82 16 110/71 85 97 % -- -- -- --   09/19/23 0900 -- 92 18 121/76 92 95 % -- -- -- --   09/19/23 0800 99.1 °F (37.3 °C) 88 17 145/90 112 94 % -- -- -- --   09/19/23 0612 100.1 °F (37.8 °C) 108 Abnormal  26 Abnormal  138/86 -- -- -- -- -- --   09/19/23 0600 -- 96 24 Abnormal  129/84 100 96 % -- -- -- --         Pertinent Labs/Diagnostic Test Results:   XR chest portable ICU   Final Result by Matteo Kimble MD (09/19 1633)      Small opacity in the left lower lung can represent pneumonia in the appropriate clinical setting                  Workstation performed: [id number]         MRI inpatient order    (Results Pending)     09/18 CTH: No acute intracranial abnormality    Results from last 7 days   Lab Units 09/18/23  1313   SARS-COV-2  Negative     Results from last 7 days   Lab Units 09/20/23  0513 09/19/23  0451 09/18/23  2204 09/18/23  1313   WBC Thousand/uL 10.92* 15.43* 14.18* 16.47*   HEMOGLOBIN g/dL 12.6 13.1 13.2 14.8   HEMATOCRIT % 36.1* 37.6 38.6 42.7   PLATELETS Thousands/uL 77* 82* 79* 90*   NEUTROS ABS Thousands/µL  --  12.67* 11.56* 13.92*         Results from last 7 days   Lab Units 09/20/23  0524 09/20/23  0456 09/19/23  0609 09/19/23  0451 09/19/23  0441 09/18/23 2204 09/18/23  1313   SODIUM mmol/L  --  136  --   --  137 134* 130*   POTASSIUM mmol/L  --  3.7  --   --  3.7 4.0 4.1   CHLORIDE mmol/L  --  101  --   --  102 100 94*   CO2 mmol/L  --  24  --   --  24 26 25   ANION GAP mmol/L  --  11  --   --  11 8 11   BUN mg/dL  --  7  --   --  9 10 14   CREATININE mg/dL  --  0.64  --   --  0.81 0.76 0.85   EGFR ml/min/1.73sq m  --  119  --   --  108 110 105   CALCIUM mg/dL  --  8.1*  --   --  8.4 8.9 9.6 CALCIUM, IONIZED mmol/L 1.07*  --  0.97*  --   --   --   --    MAGNESIUM mg/dL  --  1.7*  --  1.9  --  1.4*  --    PHOSPHORUS mg/dL  --   --   --  3.2  --  2.2*  --      Results from last 7 days   Lab Units 09/20/23  0456 09/19/23 0441 09/18/23 2204 09/18/23  1313   AST U/L 59* 52* 52* 65*   ALT U/L 29 27 28 31   ALK PHOS U/L 103 126* 112* 153*   TOTAL PROTEIN g/dL 7.1 8.0 8.0 8.4   ALBUMIN g/dL 3.6 3.7 3.8 4.1   TOTAL BILIRUBIN mg/dL 2.10* 2.07* 2.18* 2.82*   BILIRUBIN DIRECT mg/dL  --   --   --  1.26*     Results from last 7 days   Lab Units 09/20/23  0613 09/19/23  2344 09/19/23  2112 09/19/23  1703 09/19/23  1137 09/19/23  0822 09/19/23  0216 09/18/23  2106 09/18/23  1952 09/18/23  1246   POC GLUCOSE mg/dl 116 156* 140 173* 151* 176* 153* 254* 189* 283*     Results from last 7 days   Lab Units 09/20/23  0456 09/19/23 0441 09/18/23 2204 09/18/23  1313   GLUCOSE RANDOM mg/dL 117 185* 226* 314*     Results from last 7 days   Lab Units 09/18/23  1313   OSMOLALITY, SERUM mmol/     Results from last 7 days   Lab Units 09/18/23  1313   HEMOGLOBIN A1C % 7.1*   EAG mg/dl 157     BETA-HYDROXYBUTYRATE   Date Value Ref Range Status   09/18/2023 0.4 <0.6 mmol/L Final          Results from last 7 days   Lab Units 09/18/23  1313   PH GEETHA  7.421*   PCO2 GEETHA mm Hg 39.9*   PO2 GEETHA mm Hg 35.9   HCO3 GEETHA mmol/L 25.4   BASE EXC GEETHA mmol/L 0.9   O2 CONTENT GEETHA ml/dL 14.5   O2 HGB, VENOUS % 65.9             Results from last 7 days   Lab Units 09/19/23  0441 09/19/23  0007 09/18/23 2204   HS TNI 0HR ng/L  --   --  66*   HS TNI 2HR ng/L  --  66*  --    HSTNI D2 ng/L  --  0  --    HS TNI 4HR ng/L 72*  --   --    HSTNI D4 ng/L 6  --   --              Results from last 7 days   Lab Units 09/18/23  1313   TSH 3RD GENERATON uIU/mL 3.333     Results from last 7 days   Lab Units 09/19/23  0441 09/18/23  1313   PROCALCITONIN ng/ml 0.15 0.19     Results from last 7 days   Lab Units 09/18/23 2204 09/18/23  1313   LACTIC ACID mmol/L 1. 2 1.2     Results from last 7 days   Lab Units 09/18/23  2204   PROLACTIN ng/mL 11.16                         Results from last 7 days   Lab Units 09/19/23  0441   HEP B S AG  Non-reactive   HEP C AB  Non-reactive   HEP B C IGM  Non-reactive     Results from last 7 days   Lab Units 09/18/23  1313   LIPASE u/L 52             Results from last 7 days   Lab Units 09/18/23  1313   OSMOLALITY, SERUM mmol/     Results from last 7 days   Lab Units 09/18/23  1320   CLARITY UA  Clear   COLOR UA  Orange   SPEC GRAV UA  1.010   PH UA  6.0   GLUCOSE UA mg/dl 500 (1/2%)*   KETONES UA mg/dl Trace*   BLOOD UA  Negative   PROTEIN UA mg/dl 30 (1+)*   NITRITE UA  Negative   BILIRUBIN UA  Small*   UROBILINOGEN UA E.U./dl 4.0*   LEUKOCYTES UA  Negative   WBC UA /hpf 2-4   RBC UA /hpf None Seen   BACTERIA UA /hpf Occasional   EPITHELIAL CELLS WET PREP /hpf Occasional   SODIUM UR  31   CREATININE UR mg/dL 129.5     Results from last 7 days   Lab Units 09/19/23  0017 09/18/23  1313   STREP PNEUMONIAE ANTIGEN, URINE  Negative  --    LEGIONELLA URINARY ANTIGEN  Negative  --    INFLUENZA A PCR   --  Negative   INFLUENZA B PCR   --  Negative   RSV PCR   --  Negative         Results from last 7 days   Lab Units 09/18/23  1320   AMPH/METH  Negative   BARBITURATE UR  Negative   BENZODIAZEPINE UR  Positive*   COCAINE UR  Negative   METHADONE URINE  Negative   OPIATE UR  Negative   PCP UR  Negative   THC UR  Negative                     Results from last 7 days   Lab Units 09/19/23  1436 09/19/23  0608 09/18/23  2018   BLOOD CULTURE   --  Received in Microbiology Lab. Culture in Progress. Received in Microbiology Lab. Culture in Progress. No Growth at 24 hrs. No Growth at 24 hrs.    GRAM STAIN RESULT  Rare Mononuclear Cells  No Polys or Bacteria seen  --   --      Results from last 7 days   Lab Units 09/19/23  1436   TOTAL COUNTED  5     Results from last 7 days   Lab Units 09/19/23  1436   APPEARANCE CSF  Clear   TUBE NUM CSF  4   WBC CSF /uL 1   XANTHOCHROMIA  No   NEUTROPHILS % (CSF) % 20   LYMPHS % (CSF) % 80   GLUCOSE CSF mg/dL 120*   PROTEIN CSF mg/dL 65*   RBC CSF uL 17*     Results from last 7 days   Lab Units 09/19/23  0441   VANCOMYCIN RM ug/mL 13.2       ED Treatment:   Medication Administration - No Administrations Displayed (No Start Event Found)     None        Past Medical History:   Diagnosis Date   • Allergic    • Diabetes mellitus ([address])    • Gout    • Hypertension    • Psoriasis    • Sinus infection      Present on Admission:  • Seizure-like activity ([address])      Admitting Diagnosis: Sepsis ([address]) [A41.9]  Age/Sex: 38 y.o. male  Admission Orders:  SCD  PT/OT  Continuous Pulse Ox  CIWA    Scheduled Medications:  cefTRIAXone, 2,000 mg, Intravenous, Q12H  heparin (porcine), 5,000 Units, Subcutaneous, Q8H [address]  insulin lispro, 1-6 Units, Subcutaneous, TID AC  insulin lispro, 1-6 Units, Subcutaneous, HS  lidocaine (PF), 10 mL, Infiltration, Once  magnesium sulfate, 2 g, Intravenous, Once  multivitamin-minerals, 1 tablet, Oral, Daily  pantoprazole, 40 mg, Oral, Early Morning  thiamine, 500 mg, Intravenous, Q8H  vancomycin, 1,500 mg, Intravenous, Q12H  acyclovir (ZOVIRAX) 850 mg in sodium chloride 0.9 % 250 mL IVPB  Dose: 10 mg/kg  Weight Dosing Info: 84.5 kg (Ideal)  Freq: Every 8 hours Route: IV  Last Dose: Stopped (09/20/23 0718)  Start: 09/19/23 1330 End: 09/19/23 1758  pantoprazole (PROTONIX) injection 40 mg  Dose: 40 mg  Freq: Every 24 hours scheduled Route: IV  Start: 09/19/23 0600 End: 09/20/23 0714    Continuous IV Infusions:  multi-electrolyte, 125 mL/hr, Intravenous, Continuous  dexmedeTOMIDine (Precedex) 400 mcg in sodium chloride 0.9% 100 mL  Rate: 2.6-17.9 mL/hr Dose: 0.1-0.7 mcg/kg/hr  Weight Dosing Info: 102 kg  Freq: Titrated Route: IV  Last Dose: Stopped (09/19/23 1649)  Start: 09/19/23 0500 End: 09/20/23 0504    PRN Meds:  acetaminophen, 650 mg, Oral, Q6H PRN  calcium carbonate, 1,000 mg, Oral, Daily PRN  hydrALAZINE, 5 mg, Intravenous, Q6H PRN  ondansetron, 4 mg, Intravenous, Q6H PRN  simethicone, 80 mg, Oral, 4x Daily PRN        IP CONSULT TO PHARMACY    Network Utilization Review Department  ATTENTION: Please call with any questions or concerns to [phone] and carefully listen to the prompts so that you are directed to the right person. All voicemails are confidential.  Benjamin Gifford all requests for admission clinical reviews, approved or denied determinations and any other requests to dedicated fax number below belonging to the campus where the patient is receiving treatment.  List of dedicated fax numbers for the Facilities:  [facility] (Administrative/Medical Necessity) [phone] [address] (Maternity/NICU/Pediatrics) [phone]   [address] [place] [address] [phone]   [facility] [address] [facility] [phone] [address] [facility] [address] [address] [facility] [phone]   [address] [id number] [place] [address] [phone]

## 2023-09-20 NOTE — ASSESSMENT & PLAN NOTE
Lab Results   Component Value Date    HGBA1C 7.1 (H) 09/18/2023       Recent Labs     09/19/23  2344 09/20/23  0613 09/20/23  1130 09/20/23  1625   POCGLU 156* 116 177* 137       Blood Sugar Average: Last 72 hrs:  (P) 153.25   Insulin sliding scale ordered

## 2023-09-20 NOTE — PROGRESS NOTES
[facility]  Progress Note  Name: Elba Xiong I  MRN: [id number]  Unit/Bed#: [facility]PHP 214-24 I Date of Admission: 9/19/2023   Date of Service: 9/20/2023 I Hospital Day: 1    Assessment/Plan   Acute encephalopathy  Assessment & Plan  · Patient with history of alcohol use disorder, states he has been without drinking for about 3 days prior to admission because he had some form of diarrheal illness  · Around that time he began to note visual hallucinations including seeing people in his backyard cutting down his trees. He is he called the police about this and he possibly hallucinated more police personnel walking around his yard and then those who actually showed up  · While in the hospital patient developed seizure-like activity and was transferred for further monitoring  · Question if this may have been an alcohol withdrawal seizure vs encephalitis?     · Consult neurology  · LP completed and results pending  · MRI without significant structural abnormalities at this time  · Continue broad-spectrum antibiotics  · Patient alert and oriented x3 at bedside today, denies any hallucinations or abnormal phenomena in room    Swelling of buccal mucosa  Assessment & Plan  · Patient feels that he has bilateral buccal mucosa swelling, possibly right greater than left  · No significant pathology noted on imaging of the portions of oral mucosa noted on CT/MRI  · Patient feels that it may actually be improving today, but just more noticeable because he is eating more  · Warm compress ordered, if symptoms failing to improve by tomorrow, consider dedicated maxillofacial imaging    Seizure-like activity [facility])  Assessment & Plan  · Reported 2 episodes of grand mal activity at prior hospital and patient reports he was told he may have fallen out of his bed during one of the events  · Neurology evaluated patient right now recommending holding off on AEDs, and continued observation    Nausea, vomiting and diarrhea  Assessment & Plan  · No BM since admission, will hold off of C. difficile, but still continue infectious diarrhea work-up  · Zofran as needed nausea    Diabetes mellitus [facility])  Assessment & Plan  Lab Results   Component Value Date    HGBA1C 7.1 (H) 09/18/2023       Recent Labs     09/19/23  2344 09/20/23  0613 09/20/23  1130 09/20/23  1625   POCGLU 156* 116 177* 137       Blood Sugar Average: Last 72 hrs:  (P) 153.25   Insulin sliding scale ordered           VTE Pharmacologic Prophylaxis:   Moderate Risk (Score 3-4) - Pharmacological DVT Prophylaxis Ordered: heparin. Patient Centered Rounds: I performed bedside rounds with nursing staff today. Discussions with Specialists or Other Care Team Provider: Neurology    Education and Discussions with Family / Patient: Updated  (mother) at bedside. Total Time Spent on Date of Encounter in care of patient: 55 mins. This time was spent on one or more of the following: performing physical exam; counseling and coordination of care; obtaining or reviewing history; documenting in the medical record; reviewing/ordering tests, medications or procedures; communicating with other healthcare professionals and discussing with patient's family/caregivers. Current Length of Stay: 1 day(s)  Current Patient Status: Inpatient   Certification Statement: The patient will continue to require additional inpatient hospital stay due to AMS  Discharge Plan: Anticipate discharge in 24-48 hrs to discharge location to be determined pending rehab evaluations. Code Status: Level 1 - Full Code    Subjective:   Seen and examined at bedside this morning. Luís Rangel states he is feeling better. He is alert and oriented x3, but notes that he was fully alert and oriented when he was having his hallucinations. He endorses pain in the bilateral parotid area, but feels it is mildly improving from yesterday. No new concerns.   Denies any auditory or visual hallucinations, or any suspect phenomena occurring in the rooms    Objective:     Vitals:   Temp (24hrs), Av.9 °F (37.2 °C), Min:98.7 °F (37.1 °C), Max:99.1 °F (37.3 °C)    Temp:  [98.7 °F (37.1 °C)-99.1 °F (37.3 °C)] 99.1 °F (37.3 °C)  HR:  [83] 83  Resp:  [19] 19  BP: (133-146)/(87-97) 146/97  SpO2:  [94 %-96 %] 96 %  Body mass index is 26.84 kg/m². Input and Output Summary (last 24 hours): Intake/Output Summary (Last 24 hours) at 2023 1829  Last data filed at 2023 1700  Gross per 24 hour   Intake 110 ml   Output 1850 ml   Net -1740 ml       Physical Exam:   Physical Exam  Vitals and nursing note reviewed. Constitutional:       General: He is not in acute distress. Appearance: He is well-developed. He is not toxic-appearing or diaphoretic. HENT:      Head: Normocephalic and atraumatic. Mouth/Throat:      Mouth: Mucous membranes are moist.      Comments: BL buccal mucosa swelling  Eyes:      General: No scleral icterus. Extraocular Movements: Extraocular movements intact. Conjunctiva/sclera: Conjunctivae normal.   Cardiovascular:      Rate and Rhythm: Normal rate and regular rhythm. Pulses: Normal pulses. Heart sounds: No murmur heard. No friction rub. No gallop. Pulmonary:      Effort: Pulmonary effort is normal. No respiratory distress. Breath sounds: Normal breath sounds. No wheezing, rhonchi or rales. Abdominal:      General: Abdomen is flat. Bowel sounds are normal. There is no distension. Palpations: Abdomen is soft. There is no mass. Tenderness: There is no abdominal tenderness. There is no guarding. Musculoskeletal:         General: No swelling. Cervical back: Neck supple. Right lower leg: No edema. Left lower leg: No edema. Lymphadenopathy:      Cervical: No cervical adenopathy. Skin:     General: Skin is warm and dry. Capillary Refill: Capillary refill takes less than 2 seconds.       Coloration: Skin is not jaundiced. Findings: No rash. Neurological:      General: No focal deficit present. Mental Status: He is alert and oriented to person, place, and time. Sensory: No sensory deficit. Motor: No weakness.    Psychiatric:         Mood and Affect: Mood normal.          Additional Data:     Labs:  Results from last 7 days   Lab Units 09/20/23  0513 09/19/23  0451   WBC Thousand/uL 10.92* 15.43*   HEMOGLOBIN g/dL 12.6 13.1   HEMATOCRIT % 36.1* 37.6   PLATELETS Thousands/uL 77* 82*   NEUTROS PCT %  --  82*   LYMPHS PCT %  --  7*   MONOS PCT %  --  9   EOS PCT %  --  2     Results from last 7 days   Lab Units 09/20/23  0456   SODIUM mmol/L 136   POTASSIUM mmol/L 3.7   CHLORIDE mmol/L 101   CO2 mmol/L 24   BUN mg/dL 7   CREATININE mg/dL 0.64   ANION GAP mmol/L 11   CALCIUM mg/dL 8.1*   ALBUMIN g/dL 3.6   TOTAL BILIRUBIN mg/dL 2.10*   ALK PHOS U/L 103   ALT U/L 29   AST U/L 59*   GLUCOSE RANDOM mg/dL 117         Results from last 7 days   Lab Units 09/20/23  1625 09/20/23  1130 09/20/23  0613 09/19/23  2344 09/19/23  2112 09/19/23  1703 09/19/23  1137 09/19/23  0822 09/19/23  0216 09/18/23  2106 09/18/23  1952 09/18/23  1246   POC GLUCOSE mg/dl 137 177* 116 156* 140 173* 151* 176* 153* 254* 189* 283*     Results from last 7 days   Lab Units 09/18/23  1313   HEMOGLOBIN A1C % 7.1*     Results from last 7 days   Lab Units 09/19/23  0441 09/18/23  2204 09/18/23  1313   LACTIC ACID mmol/L  --  1.2 1.2   PROCALCITONIN ng/ml 0.15  --  0.19       Lines/Drains:  Invasive Devices     Peripheral Intravenous Line  Duration           Peripheral IV 09/18/23 Left Antecubital 2 days    Peripheral IV 09/18/23 Distal;Right;Upper;Ventral (anterior) Arm 1 day    Peripheral IV 09/19/23 Proximal;Right;Ventral (anterior) Forearm 1 day                      Imaging: Reviewed radiology reports from this admission including: MRI brain    Recent Cultures (last 7 days):   Results from last 7 days   Lab Units 09/19/23  5173 09/19/23  0608 09/19/23  0017 09/18/23 2018   BLOOD CULTURE   --  No Growth at 24 hrs. No Growth at 24 hrs.  --  No Growth at 24 hrs. No Growth at 24 hrs. GRAM STAIN RESULT  Rare Mononuclear Cells  No Polys or Bacteria seen  --   --   --    LEGIONELLA URINARY ANTIGEN   --   --  Negative  --        Last 24 Hours Medication List:   Current Facility-Administered Medications   Medication Dose Route Frequency Provider Last Rate   • acetaminophen  650 mg Oral Q6H PRN Abad Primer, CRNP     • calcium carbonate  1,000 mg Oral Daily PRN Abad Primer, CRNP     • cefTRIAXone  2,000 mg Intravenous Q12H Abad Primer, CRNP 2,000 mg (09/20/23 1230)   • heparin (porcine)  5,000 Units Subcutaneous Q8H [address], [place]     • hydrALAZINE  5 mg Intravenous Q6H PRN Abad Primer, CRNP     • insulin lispro  1-6 Units Subcutaneous TID AC ADELITA Douglas     • insulin lispro  1-6 Units Subcutaneous HS Abad Primer, CRNP     • lidocaine (PF)  10 mL Infiltration Once Abad Primer, CRNP     • multi-electrolyte  125 mL/hr Intravenous Continuous Abad Primer, CRNP 125 mL/hr (09/20/23 1545)   • multivitamin-minerals  1 tablet Oral Daily ADELITA Douglas     • ondansetron  4 mg Intravenous Q6H PRN Abad Primer, CRNP     • pantoprazole  40 mg Oral Early Morning Teodoro Brito PA-C     • simethicone  80 mg Oral 4x Daily PRN Abda Primer, CRNP     • thiamine  500 mg Intravenous Q8H Abad Primer, CRNP 500 mg (09/20/23 1519)   • vancomycin  1,500 mg Intravenous Q12H Abad Primer, CRNP 1,500 mg (09/20/23 0057)        Today, Patient Was Seen By: Taiwo Montgomery    **Please Note: This note may have been constructed using a voice recognition system. **

## 2023-09-20 NOTE — PROGRESS NOTES
The pantoprazole has / have been converted to Oral per [facility] IV-to-PO Auto-Conversion Protocol for Adults as approved by the Pharmacy and Therapeutics Committee. The patient met all eligible criteria:  3 Age = 23years old   2) Received at least one dose of the IV form   3) Receiving at least one other scheduled oral/enteral medication   4) Tolerating an oral/enteral diet   and did not have any exclusions:   1) Critical care patient   2) Active GI bleed (IF assessing H2RAs or PPIs)   3) Continuous tube feeding (IF assessing cipro, doxycycline, levofloxacin, minocycline, rifampin, or voriconazole)   4) Receiving PO vancomycin (IF assessing metronidazole)   5) Persistent nausea and/or vomiting   6) Ileus or gastrointestinal obstruction   7) Fadi/nasogastric tube set for continuous suction   8) Specific order not to automatically convert to PO (in the order's comments or if discussed in the most recent Infectious Disease or primary team's progress notes).

## 2023-09-20 NOTE — ASSESSMENT & PLAN NOTE
15-year-old male with diabetes, hypertension, alcohol abuse, who [facility] AT [place] on 9/18 with complaints of generalized weakness, nausea, diarrhea, diaphoresis, lightheadedness, hallucinations, poor p.o. intake, and not taking his medications secondary to vomiting x2 days. There was concerns for tumefactive sludge on right upper quadrant ultrasound. Patient was started on IV antibiotics and was planned to undergo MRCP. Rapid response was later initiated for altered mental status. He was subsequently transferred to the critical care unit for further evaluation and noted to have another episode of seizure-like activity later that night. Episode resolved after Ativan 2 mg IV and patient was noted to be confused after the episode. Patient was loaded with Keppra 3 g IV and subsequently transferred to [facility] critical care unit for further evaluation. Upon arrival to [facility], patient was reported to be hallucinating and tremulous and received multiple doses of Ativan along with ketamine and phenobarbital.  LP was performed with CSF studies obtained, the results noted below. Unclear etiology at this time. Per discussion with attending neurologist, suspect seizures may possibly be secondary to alcohol withdrawal. No further episodes noted since transferred to [facility]. Results:   - CT head: No acute intracranial abnormality.  - MRI brain w/wo contrast: No acute intracranial pathology. - Labs: WBC on presentation 16.47, sodium on presentation 130, glucose on presentation 314, AST 65, alk phos 153, total bilirubin 2.82, COVID/flu/RSV negative, TSH 3.333, A1c 7.1, folate > 22.3, B12 818, UA negative, UDS positive for benzodiazepines, Lyme negative, troponin 66, hepatitis panel negative  - CSF studies: Glucose 120, protein 65, WBC 1, RBC 17, Gram stain negative, ME panel negative, cryptococcal negative, HSV negative    Plan:  - CSF studies mostly benign, protein mildly elevated at 65.   Could be due to seizure  -Serum studies unremarkable. - Continue thiamine repletion per primary team  - CIWA protocol initiated however patient not requiring meds. Transient delirium with seizure, unclear etiology however suspect GABAergic withdrawal, due to combination alcohol/benzodiazepine?  -Patient will need EEG, will order now but this can be completed as an outpatient if needed. -MCV, platelets, alk phos and transaminases are all consistent with chronic alcohol abuse.  -Patient's reported alcohol intake however does not sound sufficient enough, unclear if reliable or not. -We will fill out [employer] form however okay to continue driving from a neurologic standpoint as the above-mentioned seizure occurred during a transient medical illness, and was therefore provoked.  -No additional neurologic recommendations at this time other than as outlined in note. - Okay to discharge from a neurologic standpoint.  - Office will call to arrange follow-up.

## 2023-09-20 NOTE — ASSESSMENT & PLAN NOTE
· No BM since admission, will hold off of C. difficile, but still continue infectious diarrhea work-up  · Zofran as needed nausea

## 2023-09-20 NOTE — ASSESSMENT & PLAN NOTE
- Patient reports that prior to presentation, for 2 days, he had nausea, vomiting, diarrhea, generalized weakness, poor p.o. intake, and poor sleep. - Right upper quadrant ultrasound:  1. Large amount of echogenic material nearly filling the gallbladder lumen presumably representing tumefactive sludge, without findings suspicious for a solid lesion on recent MRI abdomen. No evidence of acute cholecystitis. 2.  Mild hepatomegaly and steatosis. - MRI abdomen/MRCP:  1. Dependent sludge within the gallbladder without suspicious focal lesion on this noncontrast MRI examination. .  2. Trace volume perihepatic ascites of indeterminate etiology.   - Currently on ceftriaxone and vanco  - CDiff pending  - Medical management per primary team

## 2023-09-20 NOTE — ASSESSMENT & PLAN NOTE
· Reported 2 episodes of grand mal activity at prior hospital and patient reports he was told he may have fallen out of his bed during one of the events  · Neurology evaluated patient right now recommending holding off on AEDs, and continued observation

## 2023-09-20 NOTE — PLAN OF CARE
Problem: MOBILITY - ADULT  Goal: Maintain or return to baseline ADL function  Description: INTERVENTIONS:  -  Assess patient's ability to carry out ADLs; assess patient's baseline for ADL function and identify physical deficits which impact ability to perform ADLs (bathing, care of mouth/teeth, toileting, grooming, dressing, etc.)  - Assess/evaluate cause of self-care deficits   - Assess range of motion  - Assess patient's mobility; develop plan if impaired  - Assess patient's need for assistive devices and provide as appropriate  - Encourage maximum independence but intervene and supervise when necessary  - Involve family in performance of ADLs  - Assess for home care needs following discharge   - Consider OT consult to assist with ADL evaluation and planning for discharge  - Provide patient education as appropriate  Outcome: Progressing  Goal: Maintains/Returns to pre admission functional level  Description: INTERVENTIONS:  - Perform BMAT or MOVE assessment daily.   - Set and communicate daily mobility goal to care team and patient/family/caregiver. - Collaborate with rehabilitation services on mobility goals if consulted  - Perform Range of Motion 2 times a day. - Reposition patient every 2 hours.   - Dangle patient 2 times a day  - Stand patient 2 times a day  - Ambulate patient 2 times a day  - Out of bed to chair 2 times a day   - Out of bed for meals 2 times a day  - Out of bed for toileting  - Record patient progress and toleration of activity level   Outcome: Progressing     Problem: PAIN - ADULT  Goal: Verbalizes/displays adequate comfort level or baseline comfort level  Description: Interventions:  - Encourage patient to monitor pain and request assistance  - Assess pain using appropriate pain scale  - Administer analgesics based on type and severity of pain and evaluate response  - Implement non-pharmacological measures as appropriate and evaluate response  - Consider cultural and social influences on pain and pain management  - Notify physician/advanced practitioner if interventions unsuccessful or patient reports new pain  Outcome: Progressing     Problem: INFECTION - ADULT  Goal: Absence or prevention of progression during hospitalization  Description: INTERVENTIONS:  - Assess and monitor for signs and symptoms of infection  - Monitor lab/diagnostic results  - Monitor all insertion sites, i.e. indwelling lines, tubes, and drains  - Monitor endotracheal if appropriate and nasal secretions for changes in amount and color  - [place] appropriate cooling/warming therapies per order  - Administer medications as ordered  - Instruct and encourage patient and family to use good hand hygiene technique  - Identify and instruct in appropriate isolation precautions for identified infection/condition  Outcome: Progressing  Goal: Absence of fever/infection during neutropenic period  Description: INTERVENTIONS:  - Monitor WBC    Outcome: Progressing     Problem: SAFETY ADULT  Goal: Maintain or return to baseline ADL function  Description: INTERVENTIONS:  -  Assess patient's ability to carry out ADLs; assess patient's baseline for ADL function and identify physical deficits which impact ability to perform ADLs (bathing, care of mouth/teeth, toileting, grooming, dressing, etc.)  - Assess/evaluate cause of self-care deficits   - Assess range of motion  - Assess patient's mobility; develop plan if impaired  - Assess patient's need for assistive devices and provide as appropriate  - Encourage maximum independence but intervene and supervise when necessary  - Involve family in performance of ADLs  - Assess for home care needs following discharge   - Consider OT consult to assist with ADL evaluation and planning for discharge  - Provide patient education as appropriate  Outcome: Progressing  Goal: Maintains/Returns to pre admission functional level  Description: INTERVENTIONS:  - Perform BMAT or MOVE assessment daily.   - Set and communicate daily mobility goal to care team and patient/family/caregiver. - Collaborate with rehabilitation services on mobility goals if consulted  - Perform Range of Motion 2 times a day. - Reposition patient every 2 hours.   - Dangle patient 2 times a day  - Stand patient 2 times a day  - Ambulate patient 2 times a day  - Out of bed to chair 2 times a day   - Out of bed for meals 2 times a day  - Out of bed for toileting  - Record patient progress and toleration of activity level   Outcome: Progressing  Goal: Patient will remain free of falls  Description: INTERVENTIONS:  - Educate patient/family on patient safety including physical limitations  - Instruct patient to call for assistance with activity   - Consult OT/PT to assist with strengthening/mobility   - Keep Call bell within reach  - Keep bed low and locked with side rails adjusted as appropriate  - Keep care items and personal belongings within reach  - Initiate and maintain comfort rounds  - Make Fall Risk Sign visible to staff  - Offer Toileting every 2 Hours, in advance of need  - Initiate/Maintain bed alarm  - Apply yellow socks and bracelet for high fall risk patients  - Consider moving patient to room near nurses station  Outcome: Progressing     Problem: DISCHARGE PLANNING  Goal: Discharge to home or other facility with appropriate resources  Description: INTERVENTIONS:  - Identify barriers to discharge w/patient and caregiver  - Arrange for needed discharge resources and transportation as appropriate  - Identify discharge learning needs (meds, wound care, etc.)  - Arrange for interpretive services to assist at discharge as needed  - Refer to Case Management Department for coordinating discharge planning if the patient needs post-hospital services based on physician/advanced practitioner order or complex needs related to functional status, cognitive ability, or social support system  Outcome: Progressing     Problem: Knowledge Deficit  Goal: Patient/family/caregiver demonstrates understanding of disease process, treatment plan, medications, and discharge instructions  Description: Complete learning assessment and assess knowledge base. Interventions:  - Provide teaching at level of understanding  - Provide teaching via preferred learning methods  Outcome: Progressing     Problem: NEUROSENSORY - ADULT  Goal: Achieves stable or improved neurological status  Description: INTERVENTIONS  - Monitor and report changes in neurological status  - Monitor vital signs such as temperature, blood pressure, glucose, and any other labs ordered   - Initiate measures to prevent increased intracranial pressure  - Monitor for seizure activity and implement precautions if appropriate      Outcome: Progressing  Goal: Remains free of injury related to seizures activity  Description: INTERVENTIONS  - Maintain airway, patient safety  and administer oxygen as ordered  - Monitor patient for seizure activity, document and report duration and description of seizure to physician/advanced practitioner  - If seizure occurs,  ensure patient safety during seizure  - Reorient patient post seizure  - Seizure pads on all 4 side rails  - Instruct patient/family to notify RN of any seizure activity including if an aura is experienced  - Instruct patient/family to call for assistance with activity based on nursing assessment  - Administer anti-seizure medications if ordered    Outcome: Progressing  Goal: Achieves maximal functionality and self care  Description: INTERVENTIONS  - Monitor swallowing and airway patency with patient fatigue and changes in neurological status  - Encourage and assist patient to increase activity and self care.    - Encourage visually impaired, hearing impaired and aphasic patients to use assistive/communication devices  Outcome: Progressing     Problem: CARDIOVASCULAR - ADULT  Goal: Maintains optimal cardiac output and hemodynamic stability  Description: INTERVENTIONS:  - Monitor I/O, vital signs and rhythm  - Monitor for S/S and trends of decreased cardiac output  - Administer and titrate ordered vasoactive medications to optimize hemodynamic stability  - Assess quality of pulses, skin color and temperature  - Assess for signs of decreased coronary artery perfusion  - Instruct patient to report change in severity of symptoms  Outcome: Progressing  Goal: Absence of cardiac dysrhythmias or at baseline rhythm  Description: INTERVENTIONS:  - Continuous cardiac monitoring, vital signs, obtain 12 lead EKG if ordered  - Administer antiarrhythmic and heart rate control medications as ordered  - Monitor electrolytes and administer replacement therapy as ordered  Outcome: Progressing     Problem: RESPIRATORY - ADULT  Goal: Achieves optimal ventilation and oxygenation  Description: INTERVENTIONS:  - Assess for changes in respiratory status  - Assess for changes in mentation and behavior  - Position to facilitate oxygenation and minimize respiratory effort  - Oxygen administered by appropriate delivery if ordered  - Initiate smoking cessation education as indicated  - Encourage broncho-pulmonary hygiene including cough, deep breathe, Incentive Spirometry  - Assess the need for suctioning and aspirate as needed  - Assess and instruct to report SOB or any respiratory difficulty  - Respiratory Therapy support as indicated  Outcome: Progressing     Problem: GASTROINTESTINAL - ADULT  Goal: Minimal or absence of nausea and/or vomiting  Description: INTERVENTIONS:  - Administer IV fluids if ordered to ensure adequate hydration  - Maintain NPO status until nausea and vomiting are resolved  - Nasogastric tube if ordered  - Administer ordered antiemetic medications as needed  - Provide nonpharmacologic comfort measures as appropriate  - Advance diet as tolerated, if ordered  - Consider nutrition services referral to assist patient with adequate nutrition and appropriate food choices  Outcome: Progressing  Goal: Maintains or returns to baseline bowel function  Description: INTERVENTIONS:  - Assess bowel function  - Encourage oral fluids to ensure adequate hydration  - Administer IV fluids if ordered to ensure adequate hydration  - Administer ordered medications as needed  - Encourage mobilization and activity  - Consider nutritional services referral to assist patient with adequate nutrition and appropriate food choices  Outcome: Progressing  Goal: Maintains adequate nutritional intake  Description: INTERVENTIONS:  - Monitor percentage of each meal consumed  - Identify factors contributing to decreased intake, treat as appropriate  - Assist with meals as needed  - Monitor I&O, weight, and lab values if indicated  - Obtain nutrition services referral as needed  Outcome: Progressing  Goal: Establish and maintain optimal ostomy function  Description: INTERVENTIONS:  - Assess bowel function  - Encourage oral fluids to ensure adequate hydration  - Administer IV fluids if ordered to ensure adequate hydration   - Administer ordered medications as needed  - Encourage mobilization and activity  - Nutrition services referral to assist patient with appropriate food choices  - Assess stoma site  - Consider wound care consult   Outcome: Progressing  Goal: Oral mucous membranes remain intact  Description: INTERVENTIONS  - Assess oral mucosa and hygiene practices  - Implement preventative oral hygiene regimen  - Implement oral medicated treatments as ordered  - Initiate Nutrition services referral as needed  Outcome: Progressing     Problem: GENITOURINARY - ADULT  Goal: Maintains or returns to baseline urinary function  Description: INTERVENTIONS:  - Assess urinary function  - Encourage oral fluids to ensure adequate hydration if ordered  - Administer IV fluids as ordered to ensure adequate hydration  - Administer ordered medications as needed  - Offer frequent toileting  - Follow urinary retention protocol if ordered  Outcome: Progressing  Goal: Absence of urinary retention  Description: INTERVENTIONS:  - Assess patient’s ability to void and empty bladder  - Monitor I/O  - Bladder scan as needed  - Discuss with physician/AP medications to alleviate retention as needed  - Discuss catheterization for long term situations as appropriate  Outcome: Progressing  Goal: Urinary catheter remains patent  Description: INTERVENTIONS:  - Assess patency of urinary catheter  - If patient has a chronic valentin, consider changing catheter if non-functioning  - Follow guidelines for intermittent irrigation of non-functioning urinary catheter  Outcome: Progressing     Problem: METABOLIC, FLUID AND ELECTROLYTES - ADULT  Goal: Electrolytes maintained within normal limits  Description: INTERVENTIONS:  - Monitor labs and assess patient for signs and symptoms of electrolyte imbalances  - Administer electrolyte replacement as ordered  - Monitor response to electrolyte replacements, including repeat lab results as appropriate  - Instruct patient on fluid and nutrition as appropriate  Outcome: Progressing  Goal: Fluid balance maintained  Description: INTERVENTIONS:  - Monitor labs   - Monitor I/O and WT  - Instruct patient on fluid and nutrition as appropriate  - Assess for signs & symptoms of volume excess or deficit  Outcome: Progressing  Goal: Glucose maintained within target range  Description: INTERVENTIONS:  - Monitor Blood Glucose as ordered  - Assess for signs and symptoms of hyperglycemia and hypoglycemia  - Administer ordered medications to maintain glucose within target range  - Assess nutritional intake and initiate nutrition service referral as needed  Outcome: Progressing     Problem: HEMATOLOGIC - ADULT  Goal: Maintains hematologic stability  Description: INTERVENTIONS  - Assess for signs and symptoms of bleeding or hemorrhage  - Monitor labs  - Administer supportive blood products/factors as ordered and appropriate  Outcome: Progressing     Problem: MUSCULOSKELETAL - ADULT  Goal: Maintain or return mobility to safest level of function  Description: INTERVENTIONS:  - Assess patient's ability to carry out ADLs; assess patient's baseline for ADL function and identify physical deficits which impact ability to perform ADLs (bathing, care of mouth/teeth, toileting, grooming, dressing, etc.)  - Assess/evaluate cause of self-care deficits   - Assess range of motion  - Assess patient's mobility  - Assess patient's need for assistive devices and provide as appropriate  - Encourage maximum independence but intervene and supervise when necessary  - Involve family in performance of ADLs  - Assess for home care needs following discharge   - Consider OT consult to assist with ADL evaluation and planning for discharge  - Provide patient education as appropriate  Outcome: Progressing  Goal: Maintain proper alignment of affected body part  Description: INTERVENTIONS:  - Support, maintain and protect limb and body alignment  - Provide patient/ family with appropriate education  Outcome: Progressing     Problem: Prexisting or High Potential for Compromised Skin Integrity  Goal: Skin integrity is maintained or improved  Description: INTERVENTIONS:  - Identify patients at risk for skin breakdown  - Assess and monitor skin integrity  - Assess and monitor nutrition and hydration status  - Monitor labs   - Assess for incontinence   - Turn and reposition patient  - Assist with mobility/ambulation  - Relieve pressure over bony prominences  - Avoid friction and shearing  - Provide appropriate hygiene as needed including keeping skin clean and dry  - Evaluate need for skin moisturizer/barrier cream  - Collaborate with interdisciplinary team   - Patient/family teaching  - Consider wound care consult   Outcome: Progressing

## 2023-09-21 LAB
ANION GAP SERPL CALCULATED.3IONS-SCNC: 9 MMOL/L
BUN SERPL-MCNC: 5 MG/DL (ref 5–25)
CALCIUM SERPL-MCNC: 8.1 MG/DL (ref 8.4–10.2)
CHLORIDE SERPL-SCNC: 101 MMOL/L (ref 96–108)
CO2 SERPL-SCNC: 26 MMOL/L (ref 21–32)
CREAT SERPL-MCNC: 0.64 MG/DL (ref 0.6–1.3)
ERYTHROCYTE [DISTWIDTH] IN BLOOD BY AUTOMATED COUNT: 12.9 % (ref 11.6–15.1)
GFR SERPL CREATININE-BSD FRML MDRD: 119 ML/MIN/1.73SQ M
GLUCOSE SERPL-MCNC: 124 MG/DL (ref 65–140)
GLUCOSE SERPL-MCNC: 127 MG/DL (ref 65–140)
GLUCOSE SERPL-MCNC: 161 MG/DL (ref 65–140)
GLUCOSE SERPL-MCNC: 197 MG/DL (ref 65–140)
GLUCOSE SERPL-MCNC: 221 MG/DL (ref 65–140)
HCT VFR BLD AUTO: 37.7 % (ref 36.5–49.3)
HGB BLD-MCNC: 12.9 G/DL (ref 12–17)
MAGNESIUM SERPL-MCNC: 2 MG/DL (ref 1.9–2.7)
MCH RBC QN AUTO: 34.3 PG (ref 26.8–34.3)
MCHC RBC AUTO-ENTMCNC: 34.2 G/DL (ref 31.4–37.4)
MCV RBC AUTO: 100 FL (ref 82–98)
PLATELET # BLD AUTO: 77 THOUSANDS/UL (ref 149–390)
PMV BLD AUTO: 11.4 FL (ref 8.9–12.7)
POTASSIUM SERPL-SCNC: 3.7 MMOL/L (ref 3.5–5.3)
RBC # BLD AUTO: 3.76 MILLION/UL (ref 3.88–5.62)
SODIUM SERPL-SCNC: 136 MMOL/L (ref 135–147)
WBC # BLD AUTO: 11.54 THOUSAND/UL (ref 4.31–10.16)
WNV IGG SER QL IA: NEGATIVE
WNV IGM SER QL IA: NEGATIVE

## 2023-09-21 PROCEDURE — 99232 SBSQ HOSP IP/OBS MODERATE 35: CPT | Performed by: INTERNAL MEDICINE

## 2023-09-21 PROCEDURE — 83735 ASSAY OF MAGNESIUM: CPT | Performed by: STUDENT IN AN ORGANIZED HEALTH CARE EDUCATION/TRAINING PROGRAM

## 2023-09-21 PROCEDURE — 82948 REAGENT STRIP/BLOOD GLUCOSE: CPT

## 2023-09-21 PROCEDURE — 99232 SBSQ HOSP IP/OBS MODERATE 35: CPT | Performed by: PSYCHIATRY & NEUROLOGY

## 2023-09-21 PROCEDURE — 80048 BASIC METABOLIC PNL TOTAL CA: CPT | Performed by: STUDENT IN AN ORGANIZED HEALTH CARE EDUCATION/TRAINING PROGRAM

## 2023-09-21 PROCEDURE — 97535 SELF CARE MNGMENT TRAINING: CPT

## 2023-09-21 PROCEDURE — 85027 COMPLETE CBC AUTOMATED: CPT | Performed by: STUDENT IN AN ORGANIZED HEALTH CARE EDUCATION/TRAINING PROGRAM

## 2023-09-21 RX ADMIN — INSULIN LISPRO 2 UNITS: 100 INJECTION, SOLUTION INTRAVENOUS; SUBCUTANEOUS at 12:18

## 2023-09-21 RX ADMIN — HEPARIN SODIUM 5000 UNITS: 5000 INJECTION INTRAVENOUS; SUBCUTANEOUS at 05:28

## 2023-09-21 RX ADMIN — THIAMINE HYDROCHLORIDE 500 MG: 100 INJECTION, SOLUTION INTRAMUSCULAR; INTRAVENOUS at 12:18

## 2023-09-21 RX ADMIN — INSULIN LISPRO 1 UNITS: 100 INJECTION, SOLUTION INTRAVENOUS; SUBCUTANEOUS at 17:51

## 2023-09-21 RX ADMIN — THIAMINE HYDROCHLORIDE 500 MG: 100 INJECTION, SOLUTION INTRAMUSCULAR; INTRAVENOUS at 05:28

## 2023-09-21 RX ADMIN — PANTOPRAZOLE SODIUM 40 MG: 40 TABLET, DELAYED RELEASE ORAL at 05:28

## 2023-09-21 RX ADMIN — INSULIN LISPRO 2 UNITS: 100 INJECTION, SOLUTION INTRAVENOUS; SUBCUTANEOUS at 22:38

## 2023-09-21 RX ADMIN — HEPARIN SODIUM 5000 UNITS: 5000 INJECTION INTRAVENOUS; SUBCUTANEOUS at 22:38

## 2023-09-21 RX ADMIN — HEPARIN SODIUM 5000 UNITS: 5000 INJECTION INTRAVENOUS; SUBCUTANEOUS at 17:51

## 2023-09-21 RX ADMIN — Medication 1 TABLET: at 09:12

## 2023-09-21 RX ADMIN — THIAMINE HYDROCHLORIDE 500 MG: 100 INJECTION, SOLUTION INTRAMUSCULAR; INTRAVENOUS at 20:37

## 2023-09-21 RX ADMIN — CEFTRIAXONE SODIUM 2000 MG: 10 INJECTION, POWDER, FOR SOLUTION INTRAVENOUS at 00:47

## 2023-09-21 RX ADMIN — VANCOMYCIN HYDROCHLORIDE 1500 MG: 10 INJECTION, POWDER, LYOPHILIZED, FOR SOLUTION INTRAVENOUS at 00:47

## 2023-09-21 NOTE — PROGRESS NOTES
Progress Note - Neurology   Roxy Morris 38 y.o. male MRN: [id number]  Unit/Bed#: [facility] 723-01 Encounter: [id number]      Assessment/Plan     Seizure-like activity [facility])  Assessment & Plan  40-year-old male with diabetes, hypertension, alcohol abuse, who [facility]. AT [place] on 9/18 with complaints of generalized weakness, nausea, diarrhea, diaphoresis, lightheadedness, hallucinations, poor p.o. intake, and not taking his medications secondary to vomiting x2 days. There was concerns for tumefactive sludge on right upper quadrant ultrasound. Patient was started on IV antibiotics and was planned to undergo MRCP. Rapid response was later initiated for altered mental status. He was subsequently transferred to the critical care unit for further evaluation and noted to have another episode of seizure-like activity later that night. Episode resolved after Ativan 2 mg IV and patient was noted to be confused after the episode. Patient was loaded with Keppra 3 g IV and subsequently transferred to [facility] critical care unit for further evaluation. Upon arrival to [facility], patient was reported to be hallucinating and tremulous and received multiple doses of Ativan along with ketamine and phenobarbital.  LP was performed with CSF studies obtained, the results noted below. Unclear etiology at this time. Per discussion with attending neurologist, suspect seizures may possibly be secondary to alcohol withdrawal. No further episodes noted since transferred to [facility]. Results:   - CT head: No acute intracranial abnormality.  - MRI brain w/wo contrast: No acute intracranial pathology.   - Labs: WBC on presentation 16.47, sodium on presentation 130, glucose on presentation 314, AST 65, alk phos 153, total bilirubin 2.82, COVID/flu/RSV negative, TSH 3.333, A1c 7.1, folate > 22.3, B12 818, UA negative, UDS positive for benzodiazepines, Lyme negative, troponin 66, hepatitis panel negative  - CSF studies: Glucose 120, protein 65, WBC 1, RBC 17, Gram stain negative, ME panel negative, cryptococcal negative, HSV negative    Plan:  - CSF studies mostly benign, protein mildly elevated at 65. Could be due to seizure  -Serum studies unremarkable. - Continue thiamine repletion per primary team  - CIWA protocol initiated however patient not requiring meds. Transient delirium with seizure, unclear etiology however suspect GABAergic withdrawal, due to combination alcohol/benzodiazepine?  -Patient will need EEG, will order now but this can be completed as an outpatient if needed. -MCV, platelets, alk phos and transaminases are all consistent with chronic alcohol abuse.  -Patient's reported alcohol intake however does not sound sufficient enough, unclear if reliable or not. -We will fill out [employer] form however okay to continue driving from a neurologic standpoint as the above-mentioned seizure occurred during a transient medical illness, and was therefore provoked.  -No additional neurologic recommendations at this time other than as outlined in note. - Okay to discharge from a neurologic standpoint.  - Office will call to arrange follow-up. Nausea, vomiting and diarrhea  Assessment & Plan  - Patient reports that prior to presentation, for 2 days, he had nausea, vomiting, diarrhea, generalized weakness, poor p.o. intake, and poor sleep. - Right upper quadrant ultrasound:  1. Large amount of echogenic material nearly filling the gallbladder lumen presumably representing tumefactive sludge, without findings suspicious for a solid lesion on recent MRI abdomen. No evidence of acute cholecystitis. 2.  Mild hepatomegaly and steatosis. - MRI abdomen/MRCP:  1. Dependent sludge within the gallbladder without suspicious focal lesion on this noncontrast MRI examination. .  2. Trace volume perihepatic ascites of indeterminate etiology.   - Currently on ceftriaxone and vanco  - CDiff pending  - Medical management per primary team        Yulisa Galen will need follow up in in 6 weeks with epilepsy attending. He will require a routine EEG within 4 weeks. Subjective:   Patient reports that he feels better    ROS:  Review of Systems :  Constitutional:  No fevers or chills. No fatigue. ENMT: Negative for hearing loss. Eyes: Negative for photophobia and visual disturbance. Respiratory: Negative for wheezing. Negative for Shortness of breath  Cardiovascular: Negative for chest pain or tightness. Genitourinary:  Negative for any difficulty with urination. GI:  Negative for any difficulty with BM. No abdominal cramping  Neurological: Negative for dizziness, light-headedness, numbness, weakness and headaches. Vitals: Blood pressure (!) 148/103, pulse 86, temperature 99.5 °F (37.5 °C), resp. rate 16, height 6' 3" (1.905 m), weight 97.4 kg (214 lb 11.7 oz), SpO2 95 %. ,Body mass index is 26.84 kg/m². Physical Exam:   Physical Exam   Constitutional:  appears well-developed and well-nourished, no acute distress. Not cachectic. Not obese. Eyes: EOM are normal, gaze is conjugate. Pupils are equal, round. Sclera appears normal.  Cardiovascular: Normal rate, regular rhythm. Pulmonary/Chest: Respiratory Effort normal, no gross coughing, wheezing, or dyspnea. GI:  No clear abdominal tenderness, no guarding, no distension,  Skin :  No jaundice, no pallor, no clear erythema, rash or bruising. Appears dry. Musculoskeletal:  Range of motion appears within normal, no gross deformities, no clear atrophy. Psych:  Normal mood and affect, which appear congruent. Normal thought process and content. No hallucinations. No delusions. Nervous system:  See below    Neurologic Exam   Mental Status    Oriented to person, place, and time. Level of consciousness: alert  Normal comprehension. Cranial Nerves    Visual fields full to confrontation. Pupils are equal, round, and reactive to light.   Extraocular motions are normal. Nystagmus: none   Face is symmetric with respect to motor and sensory. Tongue, palate, uvula midline. Motor Exam   5/5 strength in all four extremities. Muscle bulk: normal  Overall muscle tone: normal  Sensory Exam   Sensation intact to light touch,  Gait, Coordination, and Reflexes   DTR's symmetric  No gross ataxia. Lab, Imaging and other studies: I have personally reviewed pertinent reports. VTE Prophylaxis: Sequential compression device (Venodyne)     Counseling / Coordination of Care  Total time spent today 45 minutes. Greater than 50% of total time was spent with the patient and / or family counseling and / or coordination of care. A description of the counseling / coordination of care: Results of CSF, and serum, and imaging. Timeframe of symptoms. Differential, driving, seizure medications, benzodiazepines and implications for clinical history.

## 2023-09-21 NOTE — ASSESSMENT & PLAN NOTE
Lab Results   Component Value Date    HGBA1C 7.1 (H) 09/18/2023       Recent Labs     09/20/23 2051 09/20/23  2349 09/21/23  0651 09/21/23  1046   POCGLU 161* 158* 127 221*       Blood Sugar Average: Last 72 hrs:  (P) 157.75   Insulin sliding scale ordered

## 2023-09-21 NOTE — OCCUPATIONAL THERAPY NOTE
Occupational Therapy Treatment Note       09/21/23 1016   OT Last Visit   OT Visit Date 09/21/23   Pain Assessment   Pain Assessment Tool FLACC   Pain Rating: FLACC (Rest) - Face 0   Pain Rating: FLACC (Rest) - Legs 0   Pain Rating: FLACC (Rest) - Activity 0   Pain Rating: FLACC (Rest) - Cry 0   Pain Rating: FLACC (Rest) - Consolability 0   Score: FLACC (Rest) 0   Pain Rating: FLACC (Activity) - Face 0   Pain Rating: FLACC (Activity) - Legs 0   Pain Rating: FLACC (Activity) - Activity 0   Pain Rating: FLACC (Activity) - Cry 0   Pain Rating: FLACC (Activity) - Consolability 0   Score: FLACC (Activity) 0   Restrictions/Precautions   Weight Bearing Precautions Per Order No   Other Precautions Chair Alarm; Bed Alarm;Multiple lines;Telemetry; Fall Risk   Lifestyle   Autonomy I w/ ADLS/IADLS, transfers and functional mobility PTA   Reciprocal Relationships Pt. 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 ADL   Where Assessed Standing at sink   Eating Assistance Unable to assess   Grooming Assistance 5  Supervision/Setup   Grooming Deficit Setup;Verbal cueing;Wash/dry face; Teeth care   UB Bathing Assistance 5  Supervision/Setup   UB Bathing Deficit Setup;Verbal cueing; Chest;Right arm;Left arm; Abdomen   LB Bathing Assistance Unable to assess   UB Dressing Assistance 4  Minimal Assistance   UB Dressing Deficit Setup;Verbal cueing; Thread RUE; Thread LUE;Pull around back   LB Dressing Assistance 5  Supervision/Setup   LB Dressing Deficit Setup;Verbal cueing; Thread RLE into pants; Thread LLE into pants;Pull up over hips   Toileting Assistance  Unable to assess   Bed Mobility   Supine to Sit 5  Supervision   Additional items Verbal cues   Sit to Supine 5  Supervision   Additional items Verbal cues   Transfers   Sit to Stand 5  Supervision   Additional items Verbal cues   Stand to Sit 5  Supervision   Additional items Verbal cues   Functional Mobility   Functional Mobility 5  Supervision   Additional Comments Pt. performed functional mob in bathroom for self-care tasks and in hallway. Subjective   Subjective Pt. states "I'll go for a walk". Cognition   Overall Cognitive Status WFL   Arousal/Participation Alert; Responsive; Cooperative   Attention Within functional limits   Orientation Level Oriented X4   Memory Within functional limits   Following Commands Follows all commands and directions without difficulty   Comments Pt. very pleasant and cooperative throughout the session. Cognition Assessment Tools MOCA   Score 0.97   Activity Tolerance   Activity Tolerance Patient tolerated treatment well   Medical Staff Made Aware RN aware of treatment   Assessment   Assessment Pt. participated in a skilled OT session focusing on activity tolerance, standing dynamic balance, strength, and coordination, for increased participation in ADLs. Spoke to pt. RN / Therman Sandhoff and pt. was cleared for participation in therapy. Pt. found supine in bed HOB raised awake and alert. Pt. very pleasant and cooperative throughout the session. Pt. cognitive status was noted to be [facility] this session as he scored a 29/30 on [place] Cognitive Assessment ([facility]). A score of 28/30 on the MOCA indicates a normal cognitive status. Pt. performed functional transfers and mob with S assist 2* pt. being a fall risk. Pt. performed UB / LB dressing tasks and grooming / hygiene tasks standing at bathroom sink to increase dynamic standing balance / tolerance. Pt. required assist 2* limited safety awareness / pt. impulsive at times. Pt. did not state his therapy goal at this time. Pt. will benefit from continued OT services for ADL retraining, endurance training, and transfer training. Pt. progressing well and OT now recommends home with outpatient rehab services.  Upon conclusion of OT session pt. returned supine in bed with activated bed alarm and all needs within reach. Plan   Treatment Interventions ADL retraining;Functional transfer training;UE strengthening/ROM; Endurance training;Cognitive reorientation; Activityengagement   Goal Expiration Date 10/03/23   OT Frequency 2-3x/wk   Recommendation   OT Discharge Recommendation Home with outpatient rehabilitation     Pt. completed [place]ONICA COGNITIVE ASSESSMENT ([facility]). Pt. scored 29/30 indicating normal cognitive impairment for pt. age/ education.  Pt. scores the following in these cognitive areas:     Visuospatial / Executive:     4/5    Naming:      3/3    Attention:     6/6    Language:     3/3    Abstraction:     2/2    Delayed Recall:    4/5    Orientation:    6/6    High School Education:     +1    Total     55/07    [facility]

## 2023-09-21 NOTE — PLAN OF CARE
Problem: OCCUPATIONAL THERAPY ADULT  Goal: Performs self-care activities at highest level of function for planned discharge setting. See evaluation for individualized goals. Description: Treatment Interventions: ADL retraining, Functional transfer training, Visual perceptual retraining, UE strengthening/ROM, Cognitive reorientation, Endurance training, Patient/family training, Equipment evaluation/education, Compensatory technique education, Continued evaluation, Activityengagement, Energy conservation          See flowsheet documentation for full assessment, interventions and recommendations. Outcome: Progressing  Note: Limitation: Decreased ADL status, Decreased UE strength, Decreased Safe judgement during ADL, Decreased UE ROM, Decreased cognition, Decreased endurance, Decreased self-care trans, Decreased high-level ADLs, Visual deficit  Prognosis: Fair  Assessment: Pt. participated in a skilled OT session focusing on activity tolerance, standing dynamic balance, strength, and coordination, for increased participation in ADLs. Spoke to pt. JOANIE / Samir Fields and pt. was cleared for participation in therapy. Pt. found supine in bed HOB raised awake and alert. Pt. very pleasant and cooperative throughout the session. Pt. cognitive status was noted to be [facility] this session as he scored a 29/30 on [place] Cognitive Assessment ([address] [place] [address]). A score of 28/30 on the MOCA indicates a normal cognitive status. Pt. performed functional transfers and mob with S assist 2* pt. being a fall risk. Pt. performed UB / LB dressing tasks and grooming / hygiene tasks standing at bathroom sink to increase dynamic standing balance / tolerance. Pt. required assist 2* limited safety awareness / pt. impulsive at times. Pt. did not state his therapy goal at this time. Pt. will benefit from continued OT services for ADL retraining, endurance training, and transfer training.  Pt. progressing well and OT now recommends home with outpatient rehab services. Upon conclusion of OT session pt. returned supine in bed with activated bed alarm and all needs within reach.      OT Discharge Recommendation: Home with outpatient rehabilitation

## 2023-09-21 NOTE — ASSESSMENT & PLAN NOTE
· Patient with history of alcohol use disorder, states he has been without drinking for about 3 days prior to admission because he had some form of diarrheal illness  · Around that time he began to note visual hallucinations including seeing people in his backyard cutting down his trees. He is he called the police about this and he possibly hallucinated more police personnel walking around his yard and then those who actually showed up  · While in the hospital patient developed seizure-like activity and was transferred for further monitoring  · Question if this may have been an alcohol withdrawal seizure vs encephalitis?     · Consult neurology  ·   · MRI without significant structural abnormalities at this time    · Patient alert and oriented x3 at bedside today, denies any hallucinations or abnormal phenomena in room  · Continue antibiotics, reviewed lumbar puncture findings

## 2023-09-21 NOTE — PLAN OF CARE
Problem: MOBILITY - ADULT  Goal: Maintain or return to baseline ADL function  Description: INTERVENTIONS:  -  Assess patient's ability to carry out ADLs; assess patient's baseline for ADL function and identify physical deficits which impact ability to perform ADLs (bathing, care of mouth/teeth, toileting, grooming, dressing, etc.)  - Assess/evaluate cause of self-care deficits   - Assess range of motion  - Assess patient's mobility; develop plan if impaired  - Assess patient's need for assistive devices and provide as appropriate  - Encourage maximum independence but intervene and supervise when necessary  - Involve family in performance of ADLs  - Assess for home care needs following discharge   - Consider OT consult to assist with ADL evaluation and planning for discharge  - Provide patient education as appropriate  Outcome: Progressing  Goal: Maintains/Returns to pre admission functional level  Description: INTERVENTIONS:  - Perform BMAT or MOVE assessment daily.   - Set and communicate daily mobility goal to care team and patient/family/caregiver. - Collaborate with rehabilitation services on mobility goals if consulted  - Perform Range of Motion 3 times a day. - Reposition patient every 3 hours.   - Dangle patient 3 times a day  - Stand patient 3 times a day  - Ambulate patient 3 times a day  - Out of bed to chair 3 times a day   - Out of bed for meals 3 times a day  - Out of bed for toileting  - Record patient progress and toleration of activity level   Outcome: Progressing     Problem: PAIN - ADULT  Goal: Verbalizes/displays adequate comfort level or baseline comfort level  Description: Interventions:  - Encourage patient to monitor pain and request assistance  - Assess pain using appropriate pain scale  - Administer analgesics based on type and severity of pain and evaluate response  - Implement non-pharmacological measures as appropriate and evaluate response  - Consider cultural and social influences on pain and pain management  - Notify physician/advanced practitioner if interventions unsuccessful or patient reports new pain  Outcome: Progressing     Problem: INFECTION - ADULT  Goal: Absence or prevention of progression during hospitalization  Description: INTERVENTIONS:  - Assess and monitor for signs and symptoms of infection  - Monitor lab/diagnostic results  - Monitor all insertion sites, i.e. indwelling lines, tubes, and drains  - Monitor endotracheal if appropriate and nasal secretions for changes in amount and color  - Bomont appropriate cooling/warming therapies per order  - Administer medications as ordered  - Instruct and encourage patient and family to use good hand hygiene technique  - Identify and instruct in appropriate isolation precautions for identified infection/condition  Outcome: Progressing  Goal: Absence of fever/infection during neutropenic period  Description: INTERVENTIONS:  - Monitor WBC    Outcome: Progressing     Problem: SAFETY ADULT  Goal: Maintain or return to baseline ADL function  Description: INTERVENTIONS:  -  Assess patient's ability to carry out ADLs; assess patient's baseline for ADL function and identify physical deficits which impact ability to perform ADLs (bathing, care of mouth/teeth, toileting, grooming, dressing, etc.)  - Assess/evaluate cause of self-care deficits   - Assess range of motion  - Assess patient's mobility; develop plan if impaired  - Assess patient's need for assistive devices and provide as appropriate  - Encourage maximum independence but intervene and supervise when necessary  - Involve family in performance of ADLs  - Assess for home care needs following discharge   - Consider OT consult to assist with ADL evaluation and planning for discharge  - Provide patient education as appropriate  Outcome: Progressing  Goal: Maintains/Returns to pre admission functional level  Description: INTERVENTIONS:  - Perform BMAT or MOVE assessment daily.   - Set and communicate daily mobility goal to care team and patient/family/caregiver. - Collaborate with rehabilitation services on mobility goals if consulted  - Perform Range of Motion 3 times a day. - Reposition patient every 3 hours.   - Dangle patient 3 times a day  - Stand patient 3 times a day  - Ambulate patient 3 times a day  - Out of bed to chair 3 times a day   - Out of bed for meals 3 times a day  - Out of bed for toileting  - Record patient progress and toleration of activity level   Outcome: Progressing  Goal: Patient will remain free of falls  Description: INTERVENTIONS:  - Educate patient/family on patient safety including physical limitations  - Instruct patient to call for assistance with activity   - Consult OT/PT to assist with strengthening/mobility   - Keep Call bell within reach  - Keep bed low and locked with side rails adjusted as appropriate  - Keep care items and personal belongings within reach  - Initiate and maintain comfort rounds  - Make Fall Risk Sign visible to staff  - Offer Toileting every 2 Hours, in advance of need  - Initiate/Maintain bed alarm  - Obtain necessary fall risk management equipment  - Apply yellow socks and bracelet for high fall risk patients  - Consider moving patient to room near nurses station  Outcome: Progressing     Problem: DISCHARGE PLANNING  Goal: Discharge to home or other facility with appropriate resources  Description: INTERVENTIONS:  - Identify barriers to discharge w/patient and caregiver  - Arrange for needed discharge resources and transportation as appropriate  - Identify discharge learning needs (meds, wound care, etc.)  - Arrange for interpretive services to assist at discharge as needed  - Refer to Case Management Department for coordinating discharge planning if the patient needs post-hospital services based on physician/advanced practitioner order or complex needs related to functional status, cognitive ability, or social support system  Outcome: Progressing     Problem: Knowledge Deficit  Goal: Patient/family/caregiver demonstrates understanding of disease process, treatment plan, medications, and discharge instructions  Description: Complete learning assessment and assess knowledge base. Interventions:  - Provide teaching at level of understanding  - Provide teaching via preferred learning methods  Outcome: Progressing     Problem: NEUROSENSORY - ADULT  Goal: Achieves stable or improved neurological status  Description: INTERVENTIONS  - Monitor and report changes in neurological status  - Monitor vital signs such as temperature, blood pressure, glucose, and any other labs ordered   - Initiate measures to prevent increased intracranial pressure  - Monitor for seizure activity and implement precautions if appropriate      Outcome: Progressing  Goal: Remains free of injury related to seizures activity  Description: INTERVENTIONS  - Maintain airway, patient safety  and administer oxygen as ordered  - Monitor patient for seizure activity, document and report duration and description of seizure to physician/advanced practitioner  - If seizure occurs,  ensure patient safety during seizure  - Reorient patient post seizure  - Seizure pads on all 4 side rails  - Instruct patient/family to notify RN of any seizure activity including if an aura is experienced  - Instruct patient/family to call for assistance with activity based on nursing assessment  - Administer anti-seizure medications if ordered    Outcome: Progressing  Goal: Achieves maximal functionality and self care  Description: INTERVENTIONS  - Monitor swallowing and airway patency with patient fatigue and changes in neurological status  - Encourage and assist patient to increase activity and self care.    - Encourage visually impaired, hearing impaired and aphasic patients to use assistive/communication devices  Outcome: Progressing     Problem: CARDIOVASCULAR - ADULT  Goal: Maintains optimal cardiac output and hemodynamic stability  Description: INTERVENTIONS:  - Monitor I/O, vital signs and rhythm  - Monitor for S/S and trends of decreased cardiac output  - Administer and titrate ordered vasoactive medications to optimize hemodynamic stability  - Assess quality of pulses, skin color and temperature  - Assess for signs of decreased coronary artery perfusion  - Instruct patient to report change in severity of symptoms  Outcome: Progressing  Goal: Absence of cardiac dysrhythmias or at baseline rhythm  Description: INTERVENTIONS:  - Continuous cardiac monitoring, vital signs, obtain 12 lead EKG if ordered  - Administer antiarrhythmic and heart rate control medications as ordered  - Monitor electrolytes and administer replacement therapy as ordered  Outcome: Progressing     Problem: RESPIRATORY - ADULT  Goal: Achieves optimal ventilation and oxygenation  Description: INTERVENTIONS:  - Assess for changes in respiratory status  - Assess for changes in mentation and behavior  - Position to facilitate oxygenation and minimize respiratory effort  - Oxygen administered by appropriate delivery if ordered  - Initiate smoking cessation education as indicated  - Encourage broncho-pulmonary hygiene including cough, deep breathe, Incentive Spirometry  - Assess the need for suctioning and aspirate as needed  - Assess and instruct to report SOB or any respiratory difficulty  - Respiratory Therapy support as indicated  Outcome: Progressing     Problem: GASTROINTESTINAL - ADULT  Goal: Minimal or absence of nausea and/or vomiting  Description: INTERVENTIONS:  - Administer IV fluids if ordered to ensure adequate hydration  - Maintain NPO status until nausea and vomiting are resolved  - Nasogastric tube if ordered  - Administer ordered antiemetic medications as needed  - Provide nonpharmacologic comfort measures as appropriate  - Advance diet as tolerated, if ordered  - Consider nutrition services referral to assist patient with adequate nutrition and appropriate food choices  Outcome: Progressing  Goal: Maintains or returns to baseline bowel function  Description: INTERVENTIONS:  - Assess bowel function  - Encourage oral fluids to ensure adequate hydration  - Administer IV fluids if ordered to ensure adequate hydration  - Administer ordered medications as needed  - Encourage mobilization and activity  - Consider nutritional services referral to assist patient with adequate nutrition and appropriate food choices  Outcome: Progressing  Goal: Maintains adequate nutritional intake  Description: INTERVENTIONS:  - Monitor percentage of each meal consumed  - Identify factors contributing to decreased intake, treat as appropriate  - Assist with meals as needed  - Monitor I&O, weight, and lab values if indicated  - Obtain nutrition services referral as needed  Outcome: Progressing  Goal: Establish and maintain optimal ostomy function  Description: INTERVENTIONS:  - Assess bowel function  - Encourage oral fluids to ensure adequate hydration  - Administer IV fluids if ordered to ensure adequate hydration   - Administer ordered medications as needed  - Encourage mobilization and activity  - Nutrition services referral to assist patient with appropriate food choices  - Assess stoma site  - Consider wound care consult   Outcome: Progressing  Goal: Oral mucous membranes remain intact  Description: INTERVENTIONS  - Assess oral mucosa and hygiene practices  - Implement preventative oral hygiene regimen  - Implement oral medicated treatments as ordered  - Initiate Nutrition services referral as needed  Outcome: Progressing     Problem: GENITOURINARY - ADULT  Goal: Maintains or returns to baseline urinary function  Description: INTERVENTIONS:  - Assess urinary function  - Encourage oral fluids to ensure adequate hydration if ordered  - Administer IV fluids as ordered to ensure adequate hydration  - Administer ordered medications as needed  - Offer frequent toileting  - Follow urinary retention protocol if ordered  Outcome: Progressing  Goal: Absence of urinary retention  Description: INTERVENTIONS:  - Assess patient’s ability to void and empty bladder  - Monitor I/O  - Bladder scan as needed  - Discuss with physician/AP medications to alleviate retention as needed  - Discuss catheterization for long term situations as appropriate  Outcome: Progressing  Goal: Urinary catheter remains patent  Description: INTERVENTIONS:  - Assess patency of urinary catheter  - If patient has a chronic valentin, consider changing catheter if non-functioning  - Follow guidelines for intermittent irrigation of non-functioning urinary catheter  Outcome: Progressing     Problem: METABOLIC, FLUID AND ELECTROLYTES - ADULT  Goal: Electrolytes maintained within normal limits  Description: INTERVENTIONS:  - Monitor labs and assess patient for signs and symptoms of electrolyte imbalances  - Administer electrolyte replacement as ordered  - Monitor response to electrolyte replacements, including repeat lab results as appropriate  - Instruct patient on fluid and nutrition as appropriate  Outcome: Progressing  Goal: Fluid balance maintained  Description: INTERVENTIONS:  - Monitor labs   - Monitor I/O and WT  - Instruct patient on fluid and nutrition as appropriate  - Assess for signs & symptoms of volume excess or deficit  Outcome: Progressing  Goal: Glucose maintained within target range  Description: INTERVENTIONS:  - Monitor Blood Glucose as ordered  - Assess for signs and symptoms of hyperglycemia and hypoglycemia  - Administer ordered medications to maintain glucose within target range  - Assess nutritional intake and initiate nutrition service referral as needed  Outcome: Progressing     Problem: SKIN/TISSUE INTEGRITY - ADULT  Goal: Skin Integrity remains intact(Skin Breakdown Prevention)  Description: Assess:  -Perform Adonay assessment every day  -Clean and moisturize skin every day  -Inspect skin when repositioning, toileting, and assisting with ADLS  -Assess extremities for adequate circulation and sensation     Bed Management:  -Have minimal linens on bed & keep smooth, unwrinkled  -Change linens as needed when moist or perspiring  -Avoid sitting or lying in one position for more than 2 hours while in bed  -Keep HOB at 30 degrees     Toileting:  -Offer bedside commode    Activity:  -Mobilize patient 3 times a day  -Encourage activity and walks on unit  -Encourage or provide ROM exercises   -Turn and reposition patient every 2 Hours  -Use appropriate equipment to lift or move patient in bed  -Instruct/ Assist with weight shifting every 30 minutes when out of bed in chair  -Consider limitation of chair time 2 hour intervals    Skin Care:  -Avoid use of baby powder, tape, friction and shearing, hot water or constrictive clothing  -Do not massage red bony areas    Outcome: Progressing  Goal: Incision(s), wounds(s) or drain site(s) healing without S/S of infection  Description: INTERVENTIONS  - Assess and document dressing, incision, wound bed, drain sites and surrounding tissue  - Provide patient and family education  - Perform skin care/dressing changes every other day  Outcome: Progressing  Goal: Pressure injury heals and does not worsen  Description: Interventions:  - Implement low air loss mattress or specialty surface (Criteria met)  - Apply silicone foam dressing  - Instruct/assist with weight shifting every 15 minutes when in chair   - Limit chair time to 2 hour intervals  - Apply fecal or urinary incontinence containment device   - Turn and reposition patient & offload bony prominences every 2 hours   - Utilize friction reducing device or surface for transfers   - Consider nutrition services referral as needed  Outcome: Progressing     Problem: HEMATOLOGIC - ADULT  Goal: Maintains hematologic stability  Description: INTERVENTIONS  - Assess for signs and symptoms of bleeding or hemorrhage  - Monitor labs  - Administer supportive blood products/factors as ordered and appropriate  Outcome: Progressing     Problem: MUSCULOSKELETAL - ADULT  Goal: Maintain or return mobility to safest level of function  Description: INTERVENTIONS:  - Assess patient's ability to carry out ADLs; assess patient's baseline for ADL function and identify physical deficits which impact ability to perform ADLs (bathing, care of mouth/teeth, toileting, grooming, dressing, etc.)  - Assess/evaluate cause of self-care deficits   - Assess range of motion  - Assess patient's mobility  - Assess patient's need for assistive devices and provide as appropriate  - Encourage maximum independence but intervene and supervise when necessary  - Involve family in performance of ADLs  - Assess for home care needs following discharge   - Consider OT consult to assist with ADL evaluation and planning for discharge  - Provide patient education as appropriate  Outcome: Progressing  Goal: Maintain proper alignment of affected body part  Description: INTERVENTIONS:  - Support, maintain and protect limb and body alignment  - Provide patient/ family with appropriate education  Outcome: Progressing     Problem: Prexisting or High Potential for Compromised Skin Integrity  Goal: Skin integrity is maintained or improved  Description: INTERVENTIONS:  - Identify patients at risk for skin breakdown  - Assess and monitor skin integrity  - Assess and monitor nutrition and hydration status  - Monitor labs   - Assess for incontinence   - Turn and reposition patient  - Assist with mobility/ambulation  - Relieve pressure over bony prominences  - Avoid friction and shearing  - Provide appropriate hygiene as needed including keeping skin clean and dry  - Evaluate need for skin moisturizer/barrier cream  - Collaborate with interdisciplinary team   - Patient/family teaching  - Consider wound care consult   Outcome: Progressing

## 2023-09-21 NOTE — PROGRESS NOTES
[address] [facility]  Progress Note  Name: Saira Bernstein I  MRN: [id number]  Unit/Bed#: [facility] 559-54 I Date of Admission: 9/19/2023   Date of Service: 9/21/2023 I Hospital Day: 2    Assessment/Plan   Swelling of buccal mucosa  Assessment & Plan  · Patient feels that he has bilateral buccal mucosa swelling, possibly right greater than left  · No significant pathology noted on imaging of the portions of oral mucosa noted on CT/MRI  · Patient feels that it may actually be improving today, but just more noticeable because he is eating more  · Warm compress ordered, if symptoms failing to improve by tomorrow, consider dedicated maxillofacial imaging    Seizure-like activity [facility])  Assessment & Plan  · Reported 2 episodes of grand mal activity at prior hospital and patient reports he was told he may have fallen out of his bed during one of the events  · Neurology evaluated patient right now recommending holding off on AEDs, and continued observation    Acute encephalopathy  Assessment & Plan  · Patient with history of alcohol use disorder, states he has been without drinking for about 3 days prior to admission because he had some form of diarrheal illness  · Around that time he began to note visual hallucinations including seeing people in his backyard cutting down his trees. He is he called the police about this and he possibly hallucinated more police personnel walking around his yard and then those who actually showed up  · While in the hospital patient developed seizure-like activity and was transferred for further monitoring  · Question if this may have been an alcohol withdrawal seizure vs encephalitis?     · Consult neurology  ·   · MRI without significant structural abnormalities at this time    · Patient alert and oriented x3 at bedside today, denies any hallucinations or abnormal phenomena in room  · Continue antibiotics, reviewed lumbar puncture findings    Nausea, vomiting and diarrhea  Assessment & Plan  · No BM since admission,  · Zofran as needed nausea    Diabetes mellitus [facility])  Assessment & Plan  Lab Results   Component Value Date    HGBA1C 7.1 (H) 2023       Recent Labs     23  2349 23  0651 23  1046   POCGLU 161* 158* 127 221*       Blood Sugar Average: Last 72 hrs:  (P) 157.75   Insulin sliding scale ordered               VTE Pharmacologic Prophylaxis:   High Risk (Score >/= 5) - Pharmacological DVT Prophylaxis Ordered: heparin. Sequential Compression Devices Ordered. Patient Centered Rounds: I performed bedside rounds with nursing staff today. Discussions with Specialists or Other Care Team Provider: n/a    Education and Discussions with Family / Patient: Patient declined call to . Total Time Spent on Date of Encounter in care of patient: 35 mins. This time was spent on one or more of the following: performing physical exam; counseling and coordination of care; obtaining or reviewing history; documenting in the medical record; reviewing/ordering tests, medications or procedures; communicating with other healthcare professionals and discussing with patient's family/caregivers. Current Length of Stay: 2 day(s)  Current Patient Status: Inpatient   Certification Statement: The patient will continue to require additional inpatient hospital stay due to Pending discharge tomorrow if stable off antibiotics and facial swelling improved  Discharge Plan: Anticipate discharge in 24-48 hrs to home.     Code Status: Level 1 - Full Code    Subjective:   Patient reports improving swelling of the right side of his face, otherwise denies any acute complaints including headaches neck pain fever chills nausea vomiting chest pain shortness of breath    Objective:     Vitals:   Temp (24hrs), Av.1 °F (37.3 °C), Min:98.7 °F (37.1 °C), Max:99.4 °F (37.4 °C)    Temp:  [98.7 °F (37.1 °C)-99.4 °F (37.4 °C)] 99.2 °F (37.3 °C)  HR:  Geraldo Gupta 82  Resp:  [16] 16  BP: (138-150)/() 149/104  SpO2:  [93 %-96 %] 93 %  Body mass index is 26.84 kg/m². Input and Output Summary (last 24 hours): Intake/Output Summary (Last 24 hours) at 9/21/2023 1509  Last data filed at 9/21/2023 1210  Gross per 24 hour   Intake 4546.08 ml   Output 2550 ml   Net 1996.08 ml       Physical Exam:   Physical Exam  Vitals and nursing note reviewed. Constitutional:       General: He is not in acute distress. Appearance: He is well-developed. He is not toxic-appearing. HENT:      Head: Normocephalic and atraumatic. Eyes:      General: No scleral icterus. Conjunctiva/sclera: Conjunctivae normal.   Cardiovascular:      Rate and Rhythm: Normal rate and regular rhythm. Heart sounds: No murmur heard. No friction rub. No gallop. Pulmonary:      Effort: Pulmonary effort is normal. No respiratory distress. Breath sounds: Normal breath sounds. No stridor. No wheezing, rhonchi or rales. Chest:      Chest wall: No tenderness. Abdominal:      General: There is no distension. Palpations: Abdomen is soft. There is no mass. Tenderness: There is no abdominal tenderness. There is no guarding or rebound. Hernia: No hernia is present. Musculoskeletal:         General: No swelling or tenderness. Cervical back: Neck supple. Comments: Sided buccal mucosal swelling   Skin:     General: Skin is warm and dry. Capillary Refill: Capillary refill takes less than 2 seconds. Neurological:      Mental Status: He is alert and oriented to person, place, and time.    Psychiatric:         Mood and Affect: Mood normal.          Additional Data:     Labs:  Results from last 7 days   Lab Units 09/21/23  0556 09/20/23  0513 09/19/23  0451   WBC Thousand/uL 11.54*   < > 15.43*   HEMOGLOBIN g/dL 12.9   < > 13.1   HEMATOCRIT % 37.7   < > 37.6   PLATELETS Thousands/uL 77*   < > 82*   NEUTROS PCT %  --   --  82*   LYMPHS PCT %  --   --  7*   MONOS PCT %  --   --  9   EOS PCT %  --   --  2    < > = values in this interval not displayed. Results from last 7 days   Lab Units 09/21/23  0556 09/20/23  0456   SODIUM mmol/L 136 136   POTASSIUM mmol/L 3.7 3.7   CHLORIDE mmol/L 101 101   CO2 mmol/L 26 24   BUN mg/dL 5 7   CREATININE mg/dL 0.64 0.64   ANION GAP mmol/L 9 11   CALCIUM mg/dL 8.1* 8.1*   ALBUMIN g/dL  --  3.6   TOTAL BILIRUBIN mg/dL  --  2.10*   ALK PHOS U/L  --  103   ALT U/L  --  29   AST U/L  --  59*   GLUCOSE RANDOM mg/dL 124 117         Results from last 7 days   Lab Units 09/21/23  1046 09/21/23  0651 09/20/23  2349 09/20/23  2051 09/20/23  1625 09/20/23  1130 09/20/23  0910 09/19/23  2344 09/19/23  2112 09/19/23  1703 09/19/23  1137 09/19/23  0822   POC GLUCOSE mg/dl 221* 127 158* 161* 137 177* 116 156* 140 173* 151* 176*     Results from last 7 days   Lab Units 09/18/23  1313   HEMOGLOBIN A1C % 7.1*     Results from last 7 days   Lab Units 09/19/23  0441 09/18/23  2204 09/18/23  1313   LACTIC ACID mmol/L  --  1.2 1.2   PROCALCITONIN ng/ml 0.15  --  0.19       Lines/Drains:  Invasive Devices     Peripheral Intravenous Line  Duration           Peripheral IV 09/18/23 Left Antecubital 3 days    Peripheral IV 09/19/23 Proximal;Right;Ventral (anterior) Forearm 2 days                      Imaging: No pertinent imaging reviewed. Recent Cultures (last 7 days):   Results from last 7 days   Lab Units 09/19/23  1436 09/19/23  0608 09/19/23  0017 09/18/23  2018   BLOOD CULTURE   --  No Growth at 48 hrs. No Growth at 48 hrs. --  No Growth at 48 hrs. No Growth at 48 hrs.    GRAM STAIN RESULT  Rare Mononuclear Cells  No Polys or Bacteria seen  --   --   --    LEGIONELLA URINARY ANTIGEN   --   --  Negative  --        Last 24 Hours Medication List:   Current Facility-Administered Medications   Medication Dose Route Frequency Provider Last Rate   • acetaminophen  650 mg Oral Q6H PRN ADELITA Pagan     • calcium carbonate  1,000 mg Oral Daily PRN Piper Barbour ADELITA Briscoe     • heparin (porcine)  5,000 Units Subcutaneous [facility] Ricardo Heap, CRNP     • hydrALAZINE  5 mg Intravenous Q6H PRN Ricardo Heap, CRNP     • insulin lispro  1-6 Units Subcutaneous TID AC Ricardo Heap, CRNP     • insulin lispro  1-6 Units Subcutaneous HS Ricardo Heap, CRNP     • lidocaine (PF)  10 mL Infiltration Once Ricardo Heap, CRNP     • multivitamin-minerals  1 tablet Oral Daily Ricardo Heap, CRNP     • ondansetron  4 mg Intravenous Q6H PRN Ricardo Heap, CRNP     • pantoprazole  40 mg Oral Early Morning Teodoro Brito PA-C     • simethicone  80 mg Oral 4x Daily PRN Ricardo Heap, CRNP     • thiamine  500 mg Intravenous Q8H Ricardo Heap, CRNP 500 mg (09/21/23 1218)        Today, Patient Was Seen By: Jerilyn Nino DO    **Please Note: This note may have been constructed using a voice recognition system. **

## 2023-09-21 NOTE — CONSULTS
Vancomycin IV Pharmacy-to-Dose Consultation    Doyce Gowers is a 38 y.o. male who was receiving Vancomycin IV with management by the Pharmacy Consult service for treatment of CNS infection (goal -600, trough 15-20)  . The patient's Vancomycin therapy has been discontinued. Thank you for allowing us to take part in this patient's care. Pharmacy will sign-off now; please call or re-consult if there are any questions.         Ronnie Reyes, pharmacist

## 2023-09-22 ENCOUNTER — APPOINTMENT (INPATIENT)
Dept: NEUROLOGY | Facility: CLINIC | Age: 43
End: 2023-09-22
Payer: COMMERCIAL

## 2023-09-22 VITALS
RESPIRATION RATE: 19 BRPM | HEART RATE: 87 BPM | OXYGEN SATURATION: 93 % | HEIGHT: 75 IN | BODY MASS INDEX: 26.7 KG/M2 | WEIGHT: 214.73 LBS | TEMPERATURE: 98.7 F | SYSTOLIC BLOOD PRESSURE: 142 MMHG | DIASTOLIC BLOOD PRESSURE: 96 MMHG

## 2023-09-22 LAB
ALBUMIN SERPL BCP-MCNC: 3.7 G/DL (ref 3.5–5)
ALP SERPL-CCNC: 129 U/L (ref 34–104)
ALT SERPL W P-5'-P-CCNC: 24 U/L (ref 7–52)
ANION GAP SERPL CALCULATED.3IONS-SCNC: 7 MMOL/L
AST SERPL W P-5'-P-CCNC: 38 U/L (ref 13–39)
BACTERIA CSF CULT: NO GROWTH
BASOPHILS # BLD AUTO: 0.07 THOUSANDS/ÂΜL (ref 0–0.1)
BASOPHILS NFR BLD AUTO: 1 % (ref 0–1)
BILIRUB SERPL-MCNC: 1.99 MG/DL (ref 0.2–1)
BUN SERPL-MCNC: 6 MG/DL (ref 5–25)
CALCIUM SERPL-MCNC: 8.6 MG/DL (ref 8.4–10.2)
CHLORIDE SERPL-SCNC: 103 MMOL/L (ref 96–108)
CO2 SERPL-SCNC: 28 MMOL/L (ref 21–32)
CREAT SERPL-MCNC: 0.63 MG/DL (ref 0.6–1.3)
EOSINOPHIL # BLD AUTO: 0.29 THOUSAND/ÂΜL (ref 0–0.61)
EOSINOPHIL NFR BLD AUTO: 3 % (ref 0–6)
ERYTHROCYTE [DISTWIDTH] IN BLOOD BY AUTOMATED COUNT: 12.9 % (ref 11.6–15.1)
GFR SERPL CREATININE-BSD FRML MDRD: 119 ML/MIN/1.73SQ M
GGT SERPL-CCNC: 937 U/L (ref 9–64)
GLUCOSE SERPL-MCNC: 109 MG/DL (ref 65–140)
GLUCOSE SERPL-MCNC: 110 MG/DL (ref 65–140)
GLUCOSE SERPL-MCNC: 126 MG/DL (ref 65–140)
GLUCOSE SERPL-MCNC: 156 MG/DL (ref 65–140)
GLUCOSE SERPL-MCNC: 181 MG/DL (ref 65–140)
HCT VFR BLD AUTO: 41.6 % (ref 36.5–49.3)
HGB BLD-MCNC: 14 G/DL (ref 12–17)
HSV1 DNA SPEC QL NAA+PROBE: NEGATIVE
HSV2 DNA SPEC QL NAA+PROBE: NEGATIVE
IMM GRANULOCYTES # BLD AUTO: 0.06 THOUSAND/UL (ref 0–0.2)
IMM GRANULOCYTES NFR BLD AUTO: 1 % (ref 0–2)
LYMPHOCYTES # BLD AUTO: 1.48 THOUSANDS/ÂΜL (ref 0.6–4.47)
LYMPHOCYTES NFR BLD AUTO: 13 % (ref 14–44)
MCH RBC QN AUTO: 34.1 PG (ref 26.8–34.3)
MCHC RBC AUTO-ENTMCNC: 33.7 G/DL (ref 31.4–37.4)
MCV RBC AUTO: 101 FL (ref 82–98)
MONOCYTES # BLD AUTO: 1.53 THOUSAND/ÂΜL (ref 0.17–1.22)
MONOCYTES NFR BLD AUTO: 14 % (ref 4–12)
NEUTROPHILS # BLD AUTO: 7.82 THOUSANDS/ÂΜL (ref 1.85–7.62)
NEUTS SEG NFR BLD AUTO: 68 % (ref 43–75)
NRBC BLD AUTO-RTO: 0 /100 WBCS
PLATELET # BLD AUTO: 94 THOUSANDS/UL (ref 149–390)
PMV BLD AUTO: 11.2 FL (ref 8.9–12.7)
POTASSIUM SERPL-SCNC: 4.3 MMOL/L (ref 3.5–5.3)
PROT SERPL-MCNC: 7.6 G/DL (ref 6.4–8.4)
RBC # BLD AUTO: 4.11 MILLION/UL (ref 3.88–5.62)
SODIUM SERPL-SCNC: 138 MMOL/L (ref 135–147)
WBC # BLD AUTO: 11.25 THOUSAND/UL (ref 4.31–10.16)

## 2023-09-22 PROCEDURE — 82977 ASSAY OF GGT: CPT | Performed by: INTERNAL MEDICINE

## 2023-09-22 PROCEDURE — 80053 COMPREHEN METABOLIC PANEL: CPT | Performed by: INTERNAL MEDICINE

## 2023-09-22 PROCEDURE — 95816 EEG AWAKE AND DROWSY: CPT

## 2023-09-22 PROCEDURE — 99239 HOSP IP/OBS DSCHRG MGMT >30: CPT | Performed by: FAMILY MEDICINE

## 2023-09-22 PROCEDURE — 82948 REAGENT STRIP/BLOOD GLUCOSE: CPT

## 2023-09-22 PROCEDURE — 85025 COMPLETE CBC W/AUTO DIFF WBC: CPT | Performed by: INTERNAL MEDICINE

## 2023-09-22 PROCEDURE — 95816 EEG AWAKE AND DROWSY: CPT | Performed by: PSYCHIATRY & NEUROLOGY

## 2023-09-22 RX ORDER — LANOLIN ALCOHOL/MO/W.PET/CERES
100 CREAM (GRAM) TOPICAL DAILY
Qty: 30 TABLET | Refills: 0 | Status: SHIPPED | OUTPATIENT
Start: 2023-09-22

## 2023-09-22 RX ADMIN — PANTOPRAZOLE SODIUM 40 MG: 40 TABLET, DELAYED RELEASE ORAL at 05:28

## 2023-09-22 RX ADMIN — HEPARIN SODIUM 5000 UNITS: 5000 INJECTION INTRAVENOUS; SUBCUTANEOUS at 05:28

## 2023-09-22 RX ADMIN — HEPARIN SODIUM 5000 UNITS: 5000 INJECTION INTRAVENOUS; SUBCUTANEOUS at 13:00

## 2023-09-22 RX ADMIN — INSULIN LISPRO 1 UNITS: 100 INJECTION, SOLUTION INTRAVENOUS; SUBCUTANEOUS at 11:41

## 2023-09-22 RX ADMIN — INSULIN LISPRO 1 UNITS: 100 INJECTION, SOLUTION INTRAVENOUS; SUBCUTANEOUS at 16:09

## 2023-09-22 RX ADMIN — Medication 1 TABLET: at 08:40

## 2023-09-22 NOTE — PLAN OF CARE
Problem: PAIN - ADULT  Goal: Verbalizes/displays adequate comfort level or baseline comfort level  Description: Interventions:  - Encourage patient to monitor pain and request assistance  - Assess pain using appropriate pain scale  - Administer analgesics based on type and severity of pain and evaluate response  - Implement non-pharmacological measures as appropriate and evaluate response  - Consider cultural and social influences on pain and pain management  - Notify physician/advanced practitioner if interventions unsuccessful or patient reports new pain  Outcome: Progressing     Problem: INFECTION - ADULT  Goal: Absence or prevention of progression during hospitalization  Description: INTERVENTIONS:  - Assess and monitor for signs and symptoms of infection  - Monitor lab/diagnostic results  - Monitor all insertion sites, i.e. indwelling lines, tubes, and drains  - Monitor endotracheal if appropriate and nasal secretions for changes in amount and color  - [place] appropriate cooling/warming therapies per order  - Administer medications as ordered  - Instruct and encourage patient and family to use good hand hygiene technique  - Identify and instruct in appropriate isolation precautions for identified infection/condition  Outcome: Progressing     Problem: SAFETY ADULT  Goal: Patient will remain free of falls  Description: INTERVENTIONS:  - Educate patient/family on patient safety including physical limitations  - Instruct patient to call for assistance with activity   - Consult OT/PT to assist with strengthening/mobility   - Keep Call bell within reach  - Keep bed low and locked with side rails adjusted as appropriate  - Keep care items and personal belongings within reach  - Initiate and maintain comfort rounds  - Make Fall Risk Sign visible to staff  - Apply yellow socks and bracelet for high fall risk patients  - Consider moving patient to room near nurses station  Outcome: Progressing     Problem: NEUROSENSORY - ADULT  Goal: Remains free of injury related to seizures activity  Description: INTERVENTIONS  - Maintain airway, patient safety  and administer oxygen as ordered  - Monitor patient for seizure activity, document and report duration and description of seizure to physician/advanced practitioner  - If seizure occurs,  ensure patient safety during seizure  - Reorient patient post seizure  - Seizure pads on all 4 side rails  - Instruct patient/family to notify RN of any seizure activity including if an aura is experienced  - Instruct patient/family to call for assistance with activity based on nursing assessment  - Administer anti-seizure medications if ordered    Outcome: Progressing     Problem: GENITOURINARY - ADULT  Goal: Maintains or returns to baseline urinary function  Description: INTERVENTIONS:  - Assess urinary function  - Encourage oral fluids to ensure adequate hydration if ordered  - Administer IV fluids as ordered to ensure adequate hydration  - Administer ordered medications as needed  - Offer frequent toileting  - Follow urinary retention protocol if ordered  Outcome: Progressing oral

## 2023-09-22 NOTE — ASSESSMENT & PLAN NOTE
· Patient with history of alcohol use disorder, states he has been without drinking for about 3 days prior to admission because he had some form of diarrheal illness  · Around that time he began to note visual hallucinations including seeing people in his backyard cutting down his trees. He is he called the police about this and he possibly hallucinated more police personnel walking around his yard and then those who actually showed up  · While in the hospital patient developed seizure-like activity and was transferred for further monitoring  · Question if this may have been an alcohol withdrawal seizure vs encephalitis?     · Consult neurology  ·   · MRI without significant structural abnormalities at this time    · Patient alert and oriented x3 at bedside today, denies any hallucinations or abnormal phenomena in room  · Continue antibiotics, reviewed lumbar puncture findings  · Continue oral thiamine on discharge, patient is at baseline

## 2023-09-22 NOTE — CASE MANAGEMENT
Case Management Discharge Planning Note    Patient name Isael Mckinley  Location [address] [place] [address] [address] 723-01 MRN [id number]  : 1980 Date 2023       Current Admission Date: 2023  Current Admission Diagnosis:Acute encephalopathy   Patient Active Problem List    Diagnosis Date Noted   • Swelling of buccal mucosa 2023   • Seizure-like activity ([address]) 2023   • Acute encephalopathy 2023   • Hypertension    • Diabetes mellitus ([address])    • Nausea, vomiting and diarrhea    • Hyponatremia    • Sepsis ([address])    • Generalized weakness    • Left elbow pain 2022   • Olecranon bursitis of left elbow 2022      LOS (days): 3  Geometric Mean LOS (GMLOS) (days):   Days to GMLOS:     OBJECTIVE:  Risk of Unplanned Readmission Score: 11.77         Current admission status: Inpatient   Preferred Pharmacy:   [address] [place] [address] [place] [address]  Phone: [phone] Fax: [phone]    Primary Care Provider: Shreya Guerra MD    Primary Insurance: COMMERCIAL MISCELLANEOUS  Secondary Insurance: [employer] DAILY PENDING    DISCHARGE DETAILS:    Discharge planning discussed with[de-identified] Patient  Freedom of Choice: Yes     Other Referral/Resources/Interventions Provided:  Referral Comments: CM met with pt at bedside. Pt stated that is he not interested in resources/HOST referral for alcohol consumption. CM will be available if needs should arise.

## 2023-09-22 NOTE — DISCHARGE SUMMARY
[address] [facility]  Discharge- Sammy Navarrete 1980, 38 y.o. male MRN: [id number]  [facility] Encounter: [id number]  Primary Care Provider: Betsy Sue MD   Date and time admitted to hospital: 9/19/2023  4:33 AM    Swelling of buccal mucosa  Assessment & Plan  · Patient feels that he has bilateral buccal mucosa swelling, possibly right greater than left  · No significant pathology noted on imaging of the portions of oral mucosa noted on CT/MRI  · Patient feels that it may actually be improving today, but just more noticeable because he is eating more  · Warm compress ordered, if symptoms failing to improve by tomorrow, consider dedicated maxillofacial imaging    Seizure-like activity [facility])  Assessment & Plan  · Reported 2 episodes of grand mal activity at prior hospital and patient reports he was told he may have fallen out of his bed during one of the events  · Neurology evaluated patient right now recommending holding off on AEDs, and continued observation    Nausea, vomiting and diarrhea  Assessment & Plan  · No BM since admission,  · Zofran as needed nausea    Diabetes mellitus ([address])  Assessment & Plan  Lab Results   Component Value Date    HGBA1C 7.1 (H) 09/18/2023       Recent Labs     09/22/23  0013 09/22/23  0639 09/22/23  1123 09/22/23  1603   POCGLU 126 110 181* 156*       Blood Sugar Average: Last 72 hrs:  (P) 465.8106493958450709   Insulin sliding scale ordered    * Acute encephalopathy  Assessment & Plan  · Patient with history of alcohol use disorder, states he has been without drinking for about 3 days prior to admission because he had some form of diarrheal illness  · Around that time he began to note visual hallucinations including seeing people in his backyard cutting down his trees.   He is he called the police about this and he possibly hallucinated more police personnel walking around his yard and then those who actually showed up  · While in the hospital patient developed seizure-like activity and was transferred for further monitoring  · Question if this may have been an alcohol withdrawal seizure vs encephalitis? · Consult neurology  ·   · MRI without significant structural abnormalities at this time    · Patient alert and oriented x3 at bedside today, denies any hallucinations or abnormal phenomena in room  · Continue antibiotics, reviewed lumbar puncture findings  · Continue oral thiamine on discharge, patient is at baseline        Medical Problems     Resolved Problems  Date Reviewed: 9/22/2023   None       Discharging Physician / Practitioner: Sonya Sung DO  PCP: Eliot Thomas MD  Admission Date:   Admission Orders (From admission, onward)     Ordered        09/19/23 0434  Inpatient Admission  Once                      Discharge Date: 09/22/23    Consultations During Hospital Stay:  · Neurology    Procedures Performed:   · Lumbar puncture    Significant Findings / Test Results:   · MRI unremarkable     Incidental Findings:   · None      Test Results Pending at Discharge (will require follow up): · None     Outpatient Tests Requested:  · None    Complications: None    Reason for Admission: Diarrhea    Hospital Course:   • Elvin Schmidt is a 38 y.o. male patient who originally presented to the hospital on 9/19/2023 due to Patient with history of alcohol use disorder, states he has been without drinking for about 3 days prior to admission because he had some form of diarrheal illness. Around that time he began to note visual hallucinations including seeing people in his backyard cutting down his trees. He is he called the police about this and he possibly hallucinated more police personnel walking around his yard and then those who actually showed up. While in the hospital patient developed seizure-like activity and was transferred for further monitoring.  Patient had seizure-like activity and was subsequently loaded with 3 g of Keppra before being transferred to [facility]. Neurology was consulted. CSF was mostly benign but did demonstrate elevated protein at 65. Delirium, unclear etiology but given clinical context as well as the pattern of MCV, platelets, AST abnormalities concern for alcohol withdrawal. Was palced on  continued CIWA protocol and  thiamine supplementation with return to normal.  Neurology recommended to hold off on AEDs for now. Continue oral thiamine on discharge. Advised to decrease alcohol intake. Please see above list of diagnoses and related plan for additional information. Condition at Discharge: fair    Discharge Day Visit / Exam:   Subjective: Still having some right-sided jaw pain and trouble opening mouth fully. Vitals: Blood Pressure: 142/96 (09/22/23 1540)  Pulse: 87 (09/22/23 1540)  Temperature: 98.7 °F (37.1 °C) (09/22/23 1540)  Temp Source: Oral (09/19/23 2115)  Respirations: 19 (09/22/23 0736)  Height: 6' 3" (190.5 cm) (09/19/23 0612)  Weight - Scale: 97.4 kg (214 lb 11.7 oz) (09/19/23 0612)  SpO2: 93 % (09/22/23 1540)  Exam:   Physical Exam  Vitals and nursing note reviewed. Constitutional:       General: He is not in acute distress. Appearance: He is well-developed. HENT:      Head: Normocephalic and atraumatic. Mouth/Throat:      Comments: Right pterygoid muscles hypertonic  Eyes:      Conjunctiva/sclera: Conjunctivae normal.   Cardiovascular:      Rate and Rhythm: Normal rate and regular rhythm. Heart sounds: No murmur heard. Pulmonary:      Effort: Pulmonary effort is normal. No respiratory distress. Breath sounds: Normal breath sounds. Abdominal:      Palpations: Abdomen is soft. Tenderness: There is no abdominal tenderness. Musculoskeletal:         General: No swelling. Cervical back: Neck supple. Right lower leg: No edema. Left lower leg: No edema. Skin:     General: Skin is warm and dry.       Capillary Refill: Capillary refill takes less than 2 seconds. Neurological:      Mental Status: He is alert. Psychiatric:         Mood and Affect: Mood normal.          Discussion with Family: Updated  (mother) at bedside. Discharge instructions/Information to patient and family:   See after visit summary for information provided to patient and family. Provisions for Follow-Up Care:  See after visit summary for information related to follow-up care and any pertinent home health orders. Disposition:   Home    Planned Readmission: No     Discharge Statement:  I spent 35 minutes discharging the patient. This time was spent on the day of discharge. I had direct contact with the patient on the day of discharge. Greater than 50% of the total time was spent examining patient, answering all patient questions, arranging and discussing plan of care with patient as well as directly providing post-discharge instructions. Additional time then spent on discharge activities. Discharge Medications:  See after visit summary for reconciled discharge medications provided to patient and/or family.       **Please Note: This note may have been constructed using a voice recognition system**

## 2023-09-22 NOTE — ASSESSMENT & PLAN NOTE
Lab Results   Component Value Date    HGBA1C 7.1 (H) 09/18/2023       Recent Labs     09/22/23  0013 09/22/23  0639 09/22/23  1123 09/22/23  1603   POCGLU 126 110 181* 156*       Blood Sugar Average: Last 72 hrs:  (P) [id number]   Insulin sliding scale ordered

## 2023-09-22 NOTE — PLAN OF CARE
Problem: PAIN - ADULT  Goal: Verbalizes/displays adequate comfort level or baseline comfort level  Description: Interventions:  - Encourage patient to monitor pain and request assistance  - Assess pain using appropriate pain scale  - Administer analgesics based on type and severity of pain and evaluate response  - Implement non-pharmacological measures as appropriate and evaluate response  - Consider cultural and social influences on pain and pain management  - Notify physician/advanced practitioner if interventions unsuccessful or patient reports new pain  Outcome: Progressing     Problem: INFECTION - ADULT  Goal: Absence or prevention of progression during hospitalization  Description: INTERVENTIONS:  - Assess and monitor for signs and symptoms of infection  - Monitor lab/diagnostic results  - Monitor all insertion sites, i.e. indwelling lines, tubes, and drains  - Monitor endotracheal if appropriate and nasal secretions for changes in amount and color  - [place] appropriate cooling/warming therapies per order  - Administer medications as ordered  - Instruct and encourage patient and family to use good hand hygiene technique  - Identify and instruct in appropriate isolation precautions for identified infection/condition  Outcome: Progressing     Problem: SAFETY ADULT  Goal: Patient will remain free of falls  Description: INTERVENTIONS:  - Educate patient/family on patient safety including physical limitations  - Instruct patient to call for assistance with activity   - Consult OT/PT to assist with strengthening/mobility   - Keep Call bell within reach  - Keep bed low and locked with side rails adjusted as appropriate  - Keep care items and personal belongings within reach  - Initiate and maintain comfort rounds  - Make Fall Risk Sign visible to staff  - Offer Toileting every  Hours, in advance of need  - Initiate/Maintain alarm  - Obtain necessary fall risk management equipment:   - Apply yellow socks and bracelet for high fall risk patients  - Consider moving patient to room near nurses station  Outcome: Progressing     Problem: DISCHARGE PLANNING  Goal: Discharge to home or other facility with appropriate resources  Description: INTERVENTIONS:  - Identify barriers to discharge w/patient and caregiver  - Arrange for needed discharge resources and transportation as appropriate  - Identify discharge learning needs (meds, wound care, etc.)  - Arrange for interpretive services to assist at discharge as needed  - Refer to Case Management Department for coordinating discharge planning if the patient needs post-hospital services based on physician/advanced practitioner order or complex needs related to functional status, cognitive ability, or social support system  Outcome: Progressing     Problem: Knowledge Deficit  Goal: Patient/family/caregiver demonstrates understanding of disease process, treatment plan, medications, and discharge instructions  Description: Complete learning assessment and assess knowledge base.   Interventions:  - Provide teaching at level of understanding  - Provide teaching via preferred learning methods  Outcome: Progressing     Problem: NEUROSENSORY - ADULT  Goal: Remains free of injury related to seizures activity  Description: INTERVENTIONS  - Maintain airway, patient safety  and administer oxygen as ordered  - Monitor patient for seizure activity, document and report duration and description of seizure to physician/advanced practitioner  - If seizure occurs,  ensure patient safety during seizure  - Reorient patient post seizure  - Seizure pads on all 4 side rails  - Instruct patient/family to notify RN of any seizure activity including if an aura is experienced  - Instruct patient/family to call for assistance with activity based on nursing assessment  - Administer anti-seizure medications if ordered    Outcome: Progressing     Problem: GASTROINTESTINAL - ADULT  Goal: Maintains or returns to baseline bowel function  Description: INTERVENTIONS:  - Assess bowel function  - Encourage oral fluids to ensure adequate hydration  - Administer IV fluids if ordered to ensure adequate hydration  - Administer ordered medications as needed  - Encourage mobilization and activity  - Consider nutritional services referral to assist patient with adequate nutrition and appropriate food choices  Outcome: Progressing  Goal: Maintains adequate nutritional intake  Description: INTERVENTIONS:  - Monitor percentage of each meal consumed  - Identify factors contributing to decreased intake, treat as appropriate  - Assist with meals as needed  - Monitor I&O, weight, and lab values if indicated  - Obtain nutrition services referral as needed  Outcome: Progressing     Problem: GENITOURINARY - ADULT  Goal: Maintains or returns to baseline urinary function  Description: INTERVENTIONS:  - Assess urinary function  - Encourage oral fluids to ensure adequate hydration if ordered  - Administer IV fluids as ordered to ensure adequate hydration  - Administer ordered medications as needed  - Offer frequent toileting  - Follow urinary retention protocol if ordered  Outcome: Progressing     Problem: METABOLIC, FLUID AND ELECTROLYTES - ADULT  Goal: Electrolytes maintained within normal limits  Description: INTERVENTIONS:  - Monitor labs and assess patient for signs and symptoms of electrolyte imbalances  - Administer electrolyte replacement as ordered  - Monitor response to electrolyte replacements, including repeat lab results as appropriate  - Instruct patient on fluid and nutrition as appropriate  Outcome: Progressing  Goal: Glucose maintained within target range  Description: INTERVENTIONS:  - Monitor Blood Glucose as ordered  - Assess for signs and symptoms of hyperglycemia and hypoglycemia  - Administer ordered medications to maintain glucose within target range  - Assess nutritional intake and initiate nutrition service referral as needed  Outcome: Progressing     Problem: HEMATOLOGIC - ADULT  Goal: Maintains hematologic stability  Description: INTERVENTIONS  - Assess for signs and symptoms of bleeding or hemorrhage  - Monitor labs  - Administer supportive blood products/factors as ordered and appropriate  Outcome: Progressing     Problem: Prexisting or High Potential for Compromised Skin Integrity  Goal: Skin integrity is maintained or improved  Description: INTERVENTIONS:  - Identify patients at risk for skin breakdown  - Assess and monitor skin integrity  - Assess and monitor nutrition and hydration status  - Monitor labs   - Assess for incontinence   - Turn and reposition patient  - Assist with mobility/ambulation  - Relieve pressure over bony prominences  - Avoid friction and shearing  - Provide appropriate hygiene as needed including keeping skin clean and dry  - Evaluate need for skin moisturizer/barrier cream  - Collaborate with interdisciplinary team   - Patient/family teaching  - Consider wound care consult   Outcome: Progressing     Problem: MOBILITY - ADULT  Goal: Maintain or return to baseline ADL function  Description: INTERVENTIONS:  -  Assess patient's ability to carry out ADLs; assess patient's baseline for ADL function and identify physical deficits which impact ability to perform ADLs (bathing, care of mouth/teeth, toileting, grooming, dressing, etc.)  - Assess/evaluate cause of self-care deficits   - Assess range of motion  - Assess patient's mobility; develop plan if impaired  - Assess patient's need for assistive devices and provide as appropriate  - Encourage maximum independence but intervene and supervise when necessary  - Involve family in performance of ADLs  - Assess for home care needs following discharge   - Consider OT consult to assist with ADL evaluation and planning for discharge  - Provide patient education as appropriate  Outcome: Completed  Goal: Maintains/Returns to pre admission functional level  Description: INTERVENTIONS:  - Perform BMAT or MOVE assessment daily.   - Set and communicate daily mobility goal to care team and patient/family/caregiver. - Collaborate with rehabilitation services on mobility goals if consulted  - Perform Range of Motion  times a day. - Reposition patient every  hours. - Dangle patient  times a day  - Stand patient  times a day  - Ambulate patient  times a day  - Out of bed to chair  times a day   - Out of bed for meals  times a day  - Out of bed for toileting  - Record patient progress and toleration of activity level   Outcome: Completed     Problem: SAFETY ADULT  Goal: Maintain or return to baseline ADL function  Description: INTERVENTIONS:  -  Assess patient's ability to carry out ADLs; assess patient's baseline for ADL function and identify physical deficits which impact ability to perform ADLs (bathing, care of mouth/teeth, toileting, grooming, dressing, etc.)  - Assess/evaluate cause of self-care deficits   - Assess range of motion  - Assess patient's mobility; develop plan if impaired  - Assess patient's need for assistive devices and provide as appropriate  - Encourage maximum independence but intervene and supervise when necessary  - Involve family in performance of ADLs  - Assess for home care needs following discharge   - Consider OT consult to assist with ADL evaluation and planning for discharge  - Provide patient education as appropriate  Outcome: Completed  Goal: Maintains/Returns to pre admission functional level  Description: INTERVENTIONS:  - Perform BMAT or MOVE assessment daily.   - Set and communicate daily mobility goal to care team and patient/family/caregiver. - Collaborate with rehabilitation services on mobility goals if consulted  - Perform Range of Motion  times a day. - Reposition patient every  hours.   - Dangle patient  times a day  - Stand patient  times a day  - Ambulate patient  times a day  - Out of bed to chair  times a day   - Out of bed for meals  times a day  - Out of bed for toileting  - Record patient progress and toleration of activity level   Outcome: Completed     Problem: NEUROSENSORY - ADULT  Goal: Achieves stable or improved neurological status  Description: INTERVENTIONS  - Monitor and report changes in neurological status  - Monitor vital signs such as temperature, blood pressure, glucose, and any other labs ordered   - Initiate measures to prevent increased intracranial pressure  - Monitor for seizure activity and implement precautions if appropriate      Outcome: Completed  Goal: Achieves maximal functionality and self care  Description: INTERVENTIONS  - Monitor swallowing and airway patency with patient fatigue and changes in neurological status  - Encourage and assist patient to increase activity and self care.    - Encourage visually impaired, hearing impaired and aphasic patients to use assistive/communication devices  Outcome: Completed     Problem: CARDIOVASCULAR - ADULT  Goal: Maintains optimal cardiac output and hemodynamic stability  Description: INTERVENTIONS:  - Monitor I/O, vital signs and rhythm  - Monitor for S/S and trends of decreased cardiac output  - Administer and titrate ordered vasoactive medications to optimize hemodynamic stability  - Assess quality of pulses, skin color and temperature  - Assess for signs of decreased coronary artery perfusion  - Instruct patient to report change in severity of symptoms  Outcome: Completed  Goal: Absence of cardiac dysrhythmias or at baseline rhythm  Description: INTERVENTIONS:  - Continuous cardiac monitoring, vital signs, obtain 12 lead EKG if ordered  - Administer antiarrhythmic and heart rate control medications as ordered  - Monitor electrolytes and administer replacement therapy as ordered  Outcome: Completed     Problem: RESPIRATORY - ADULT  Goal: Achieves optimal ventilation and oxygenation  Description: INTERVENTIONS:  - Assess for changes in respiratory status  - Assess for changes in mentation and behavior  - Position to facilitate oxygenation and minimize respiratory effort  - Oxygen administered by appropriate delivery if ordered  - Initiate smoking cessation education as indicated  - Encourage broncho-pulmonary hygiene including cough, deep breathe, Incentive Spirometry  - Assess the need for suctioning and aspirate as needed  - Assess and instruct to report SOB or any respiratory difficulty  - Respiratory Therapy support as indicated  Outcome: Completed     Problem: GASTROINTESTINAL - ADULT  Goal: Minimal or absence of nausea and/or vomiting  Description: INTERVENTIONS:  - Administer IV fluids if ordered to ensure adequate hydration  - Maintain NPO status until nausea and vomiting are resolved  - Nasogastric tube if ordered  - Administer ordered antiemetic medications as needed  - Provide nonpharmacologic comfort measures as appropriate  - Advance diet as tolerated, if ordered  - Consider nutrition services referral to assist patient with adequate nutrition and appropriate food choices  Outcome: Completed  Goal: Establish and maintain optimal ostomy function  Description: INTERVENTIONS:  - Assess bowel function  - Encourage oral fluids to ensure adequate hydration  - Administer IV fluids if ordered to ensure adequate hydration   - Administer ordered medications as needed  - Encourage mobilization and activity  - Nutrition services referral to assist patient with appropriate food choices  - Assess stoma site  - Consider wound care consult   Outcome: Completed  Goal: Oral mucous membranes remain intact  Description: INTERVENTIONS  - Assess oral mucosa and hygiene practices  - Implement preventative oral hygiene regimen  - Implement oral medicated treatments as ordered  - Initiate Nutrition services referral as needed  Outcome: Completed     Problem: GENITOURINARY - ADULT  Goal: Absence of urinary retention  Description: INTERVENTIONS:  - Assess patient’s ability to void and empty bladder  - Monitor I/O  - Bladder scan as needed  - Discuss with physician/AP medications to alleviate retention as needed  - Discuss catheterization for long term situations as appropriate  Outcome: Completed  Goal: Urinary catheter remains patent  Description: INTERVENTIONS:  - Assess patency of urinary catheter  - If patient has a chronic valentin, consider changing catheter if non-functioning  - Follow guidelines for intermittent irrigation of non-functioning urinary catheter  Outcome: Completed     Problem: METABOLIC, FLUID AND ELECTROLYTES - ADULT  Goal: Fluid balance maintained  Description: INTERVENTIONS:  - Monitor labs   - Monitor I/O and WT  - Instruct patient on fluid and nutrition as appropriate  - Assess for signs & symptoms of volume excess or deficit  Outcome: Completed     Problem: SKIN/TISSUE INTEGRITY - ADULT  Goal: Skin Integrity remains intact(Skin Breakdown Prevention)  Description: Assess:  -Perform [place] assessment every   -Clean and moisturize skin every   -Inspect skin when repositioning, toileting, and assisting with ADLS  -Assess under medical devices such as  every   -Assess extremities for adequate circulation and sensation     Bed Management:  -Have minimal linens on bed & keep smooth, unwrinkled  -Change linens as needed when moist or perspiring  -Avoid sitting or lying in one position for more than  hours while in bed  -Keep HOB at degrees     Toileting:  -Offer bedside commode  -Assess for incontinence every   -Use incontinent care products after each incontinent episode such as     Activity:  -Mobilize patient times a day  -Encourage activity and walks on unit  -Encourage or provide ROM exercises   -Turn and reposition patient every  Hours  -Use appropriate equipment to lift or move patient in bed  -Instruct/ Assist with weight shifting every  when out of bed in chair  -Consider limitation of chair time  hour intervals    Skin Care:  -Avoid use of baby powder, tape, friction and shearing, hot water or constrictive clothing  -Relieve pressure over bony prominences using   -Do not massage red bony areas    Next Steps:  -Teach patient strategies to minimize risks such as    -Consider consults to  interdisciplinary teams such as   Outcome: Completed  Goal: Incision(s), wounds(s) or drain site(s) healing without S/S of infection  Description: INTERVENTIONS  - Assess and document dressing, incision, wound bed, drain sites and surrounding tissue  - Provide patient and family education  - Perform skin care/dressing changes every   Outcome: Completed  Goal: Pressure injury heals and does not worsen  Description: Interventions:  - Implement low air loss mattress or specialty surface (Criteria met)  - Apply silicone foam dressing  - Instruct/assist with weight shifting every  minutes when in chair   - Limit chair time to  hour intervals  - Use special pressure reducing interventions such as  when in chair   - Apply fecal or urinary incontinence containment device   - Perform passive or active ROM every   - Turn and reposition patient & offload bony prominences every  hours   - Utilize friction reducing device or surface for transfers   - Consider consults to  interdisciplinary teams such as   - Use incontinent care products after each incontinent episode such as   - Consider nutrition services referral as needed  Outcome: Completed     Problem: MUSCULOSKELETAL - ADULT  Goal: Maintain or return mobility to safest level of function  Description: INTERVENTIONS:  - Assess patient's ability to carry out ADLs; assess patient's baseline for ADL function and identify physical deficits which impact ability to perform ADLs (bathing, care of mouth/teeth, toileting, grooming, dressing, etc.)  - Assess/evaluate cause of self-care deficits   - Assess range of motion  - Assess patient's mobility  - Assess patient's need for assistive devices and provide as appropriate  - Encourage maximum independence but intervene and supervise when necessary  - Involve family in performance of ADLs  - Assess for home care needs following discharge   - Consider OT consult to assist with ADL evaluation and planning for discharge  - Provide patient education as appropriate  Outcome: Completed  Goal: Maintain proper alignment of affected body part  Description: INTERVENTIONS:  - Support, maintain and protect limb and body alignment  - Provide patient/ family with appropriate education  Outcome: Completed

## 2023-09-22 NOTE — PLAN OF CARE
Problem: HEMATOLOGIC - ADULT  Goal: Maintains hematologic stability  Description: INTERVENTIONS  - Assess for signs and symptoms of bleeding or hemorrhage  - Monitor labs  - Administer supportive blood products/factors as ordered and appropriate  Outcome: Progressing     Problem: METABOLIC, FLUID AND ELECTROLYTES - ADULT  Goal: Electrolytes maintained within normal limits  Description: INTERVENTIONS:  - Monitor labs and assess patient for signs and symptoms of electrolyte imbalances  - Administer electrolyte replacement as ordered  - Monitor response to electrolyte replacements, including repeat lab results as appropriate  - Instruct patient on fluid and nutrition as appropriate  Outcome: Progressing  Goal: Glucose maintained within target range  Description: INTERVENTIONS:  - Monitor Blood Glucose as ordered  - Assess for signs and symptoms of hyperglycemia and hypoglycemia  - Administer ordered medications to maintain glucose within target range  - Assess nutritional intake and initiate nutrition service referral as needed  Outcome: Progressing

## 2023-09-23 LAB
B BURGDOR DNA SPEC QL NAA+PROBE: NEGATIVE
B MICROTI IGG TITR SER: NORMAL {TITER}
B MICROTI IGM TITR SER: NORMAL {TITER}
RESULT/COMMENT: NORMAL

## 2023-09-24 LAB
BACTERIA BLD CULT: NORMAL

## 2023-09-25 LAB
FUNGUS SPEC CULT: NORMAL
WNV RNA SPEC QL NAA+PROBE: NOT DETECTED

## 2023-10-02 LAB — FUNGUS SPEC CULT: NORMAL

## 2023-10-05 ENCOUNTER — TELEPHONE (OUTPATIENT)
Dept: NEUROLOGY | Facility: CLINIC | Age: 43
End: 2023-10-05

## 2023-10-05 NOTE — TELEPHONE ENCOUNTER
Pt does not want to make a HFU at this time. I made him aware he has 1 year from discharge to be seen by one of our providers out patient. Pt is going to continue to follow with PCP.

## 2023-10-09 LAB — FUNGUS SPEC CULT: NORMAL

## 2023-10-16 LAB — FUNGUS SPEC CULT: NORMAL

## 2023-10-23 LAB — FUNGUS SPEC CULT: NORMAL

## 2023-12-11 ENCOUNTER — HOSPITAL ENCOUNTER (EMERGENCY)
Facility: HOSPITAL | Age: 43
Discharge: ANOTHER HOSPITAL | End: 2023-12-12
Attending: EMERGENCY MEDICINE
Payer: COMMERCIAL

## 2023-12-11 ENCOUNTER — APPOINTMENT (EMERGENCY)
Dept: CT IMAGING | Facility: HOSPITAL | Age: 43
End: 2023-12-11
Payer: COMMERCIAL

## 2023-12-11 DIAGNOSIS — K82.8 GALLBLADDER SLUDGE: ICD-10-CM

## 2023-12-11 DIAGNOSIS — K72.00 ACUTE LIVER FAILURE: ICD-10-CM

## 2023-12-11 DIAGNOSIS — E80.6 HYPERBILIRUBINEMIA: Primary | ICD-10-CM

## 2023-12-11 LAB
ALBUMIN SERPL BCP-MCNC: 3.5 G/DL (ref 3.5–5)
ALP SERPL-CCNC: 134 U/L (ref 34–104)
ALT SERPL W P-5'-P-CCNC: 75 U/L (ref 7–52)
AMMONIA PLAS-SCNC: 45 UMOL/L (ref 18–72)
ANION GAP SERPL CALCULATED.3IONS-SCNC: 11 MMOL/L
APTT PPP: 45 SECONDS (ref 23–37)
AST SERPL W P-5'-P-CCNC: 146 U/L (ref 13–39)
BASOPHILS # BLD MANUAL: 0 THOUSAND/UL (ref 0–0.1)
BASOPHILS NFR MAR MANUAL: 0 % (ref 0–1)
BILIRUB SERPL-MCNC: 10.66 MG/DL (ref 0.2–1)
BUN SERPL-MCNC: 19 MG/DL (ref 5–25)
CALCIUM SERPL-MCNC: 8.9 MG/DL (ref 8.4–10.2)
CHLORIDE SERPL-SCNC: 88 MMOL/L (ref 96–108)
CO2 SERPL-SCNC: 26 MMOL/L (ref 21–32)
CREAT SERPL-MCNC: 1.06 MG/DL (ref 0.6–1.3)
EOSINOPHIL # BLD MANUAL: 0 THOUSAND/UL (ref 0–0.4)
EOSINOPHIL NFR BLD MANUAL: 0 % (ref 0–6)
ERYTHROCYTE [DISTWIDTH] IN BLOOD BY AUTOMATED COUNT: 13.9 % (ref 11.6–15.1)
ETHANOL SERPL-MCNC: <10 MG/DL
GFR SERPL CREATININE-BSD FRML MDRD: 84 ML/MIN/1.73SQ M
GLUCOSE SERPL-MCNC: 160 MG/DL (ref 65–140)
HCT VFR BLD AUTO: 40.8 % (ref 36.5–49.3)
HGB BLD-MCNC: 14.3 G/DL (ref 12–17)
INR PPP: 1.96 (ref 0.84–1.19)
LACTATE SERPL-SCNC: 2 MMOL/L (ref 0.5–2)
LG PLATELETS BLD QL SMEAR: PRESENT
LIPASE SERPL-CCNC: 22 U/L (ref 11–82)
LYMPHOCYTES # BLD AUTO: 2.6 THOUSAND/UL (ref 0.6–4.47)
LYMPHOCYTES # BLD AUTO: 20 % (ref 14–44)
MAGNESIUM SERPL-MCNC: 1.6 MG/DL (ref 1.9–2.7)
MCH RBC QN AUTO: 32.9 PG (ref 26.8–34.3)
MCHC RBC AUTO-ENTMCNC: 35 G/DL (ref 31.4–37.4)
MCV RBC AUTO: 94 FL (ref 82–98)
MONOCYTES # BLD AUTO: 0.87 THOUSAND/UL (ref 0–1.22)
MONOCYTES NFR BLD: 8 % (ref 4–12)
NEUTROPHILS # BLD MANUAL: 7.38 THOUSAND/UL (ref 1.85–7.62)
NEUTS BAND NFR BLD MANUAL: 20 % (ref 0–8)
NEUTS SEG NFR BLD AUTO: 48 % (ref 43–75)
PHOSPHATE SERPL-MCNC: 2.4 MG/DL (ref 2.7–4.5)
PLATELET # BLD AUTO: 93 THOUSANDS/UL (ref 149–390)
PLATELET BLD QL SMEAR: ABNORMAL
PMV BLD AUTO: 11.9 FL (ref 8.9–12.7)
POTASSIUM SERPL-SCNC: 3.3 MMOL/L (ref 3.5–5.3)
PROT SERPL-MCNC: 7.6 G/DL (ref 6.4–8.4)
PROTHROMBIN TIME: 22.7 SECONDS (ref 11.6–14.5)
RBC # BLD AUTO: 4.34 MILLION/UL (ref 3.88–5.62)
SODIUM SERPL-SCNC: 125 MMOL/L (ref 135–147)
VARIANT LYMPHS # BLD AUTO: 4 %
WBC # BLD AUTO: 10.85 THOUSAND/UL (ref 4.31–10.16)
WBC TOXIC VACUOLES BLD QL SMEAR: PRESENT

## 2023-12-11 PROCEDURE — 85027 COMPLETE CBC AUTOMATED: CPT | Performed by: EMERGENCY MEDICINE

## 2023-12-11 PROCEDURE — 82077 ASSAY SPEC XCP UR&BREATH IA: CPT | Performed by: EMERGENCY MEDICINE

## 2023-12-11 PROCEDURE — 82140 ASSAY OF AMMONIA: CPT | Performed by: EMERGENCY MEDICINE

## 2023-12-11 PROCEDURE — 83605 ASSAY OF LACTIC ACID: CPT | Performed by: EMERGENCY MEDICINE

## 2023-12-11 PROCEDURE — 83690 ASSAY OF LIPASE: CPT | Performed by: EMERGENCY MEDICINE

## 2023-12-11 PROCEDURE — G1004 CDSM NDSC: HCPCS

## 2023-12-11 PROCEDURE — 80053 COMPREHEN METABOLIC PANEL: CPT | Performed by: EMERGENCY MEDICINE

## 2023-12-11 PROCEDURE — 80143 DRUG ASSAY ACETAMINOPHEN: CPT | Performed by: FAMILY MEDICINE

## 2023-12-11 PROCEDURE — 85730 THROMBOPLASTIN TIME PARTIAL: CPT | Performed by: EMERGENCY MEDICINE

## 2023-12-11 PROCEDURE — 99285 EMERGENCY DEPT VISIT HI MDM: CPT | Performed by: EMERGENCY MEDICINE

## 2023-12-11 PROCEDURE — 99285 EMERGENCY DEPT VISIT HI MDM: CPT

## 2023-12-11 PROCEDURE — 36415 COLL VENOUS BLD VENIPUNCTURE: CPT | Performed by: EMERGENCY MEDICINE

## 2023-12-11 PROCEDURE — 85007 BL SMEAR W/DIFF WBC COUNT: CPT | Performed by: EMERGENCY MEDICINE

## 2023-12-11 PROCEDURE — 74177 CT ABD & PELVIS W/CONTRAST: CPT

## 2023-12-11 PROCEDURE — 85610 PROTHROMBIN TIME: CPT | Performed by: EMERGENCY MEDICINE

## 2023-12-11 PROCEDURE — 84100 ASSAY OF PHOSPHORUS: CPT | Performed by: EMERGENCY MEDICINE

## 2023-12-11 PROCEDURE — 83735 ASSAY OF MAGNESIUM: CPT | Performed by: EMERGENCY MEDICINE

## 2023-12-11 RX ORDER — CARVEDILOL 6.25 MG/1
6.25 TABLET ORAL 2 TIMES DAILY WITH MEALS
Status: DISCONTINUED | OUTPATIENT
Start: 2023-12-12 | End: 2023-12-12 | Stop reason: HOSPADM

## 2023-12-11 RX ORDER — CARVEDILOL 6.25 MG/1
6.25 TABLET ORAL 2 TIMES DAILY WITH MEALS
COMMUNITY

## 2023-12-11 RX ORDER — GLIPIZIDE 5 MG/1
5 TABLET ORAL DAILY
Status: DISCONTINUED | OUTPATIENT
Start: 2023-12-12 | End: 2023-12-12 | Stop reason: HOSPADM

## 2023-12-11 RX ORDER — SODIUM CHLORIDE, SODIUM LACTATE, POTASSIUM CHLORIDE, CALCIUM CHLORIDE 600; 310; 30; 20 MG/100ML; MG/100ML; MG/100ML; MG/100ML
150 INJECTION, SOLUTION INTRAVENOUS CONTINUOUS
Status: DISCONTINUED | OUTPATIENT
Start: 2023-12-11 | End: 2023-12-12 | Stop reason: HOSPADM

## 2023-12-11 RX ORDER — SACCHAROMYCES BOULARDII 250 MG
250 CAPSULE ORAL 2 TIMES DAILY
Status: DISCONTINUED | OUTPATIENT
Start: 2023-12-11 | End: 2023-12-12 | Stop reason: HOSPADM

## 2023-12-11 RX ORDER — POTASSIUM CHLORIDE 750 MG/1
10 TABLET, EXTENDED RELEASE ORAL ONCE
Status: COMPLETED | OUTPATIENT
Start: 2023-12-11 | End: 2023-12-11

## 2023-12-11 RX ORDER — LISINOPRIL 2.5 MG/1
5 TABLET ORAL DAILY
Status: DISCONTINUED | OUTPATIENT
Start: 2023-12-12 | End: 2023-12-12 | Stop reason: HOSPADM

## 2023-12-11 RX ORDER — ALLOPURINOL 100 MG/1
300 TABLET ORAL DAILY
Status: DISCONTINUED | OUTPATIENT
Start: 2023-12-12 | End: 2023-12-12 | Stop reason: HOSPADM

## 2023-12-11 RX ADMIN — HYDROCORTISONE: 25 CREAM TOPICAL at 21:03

## 2023-12-11 RX ADMIN — IOHEXOL 100 ML: 350 INJECTION, SOLUTION INTRAVENOUS at 15:01

## 2023-12-11 RX ADMIN — POTASSIUM CHLORIDE 10 MEQ: 750 TABLET, EXTENDED RELEASE ORAL at 19:54

## 2023-12-11 RX ADMIN — Medication 250 MG: at 19:54

## 2023-12-11 RX ADMIN — SODIUM CHLORIDE, SODIUM LACTATE, POTASSIUM CHLORIDE, AND CALCIUM CHLORIDE 150 ML/HR: .6; .31; .03; .02 INJECTION, SOLUTION INTRAVENOUS at 13:35

## 2023-12-11 NOTE — ED PROVIDER NOTES
History  Chief Complaint   Patient presents with    Abnormal Lab     Patient previously had US on gallbladder, was told have 60% blockage evaluated by general surgery. Patient followed up with PCP with elevated liver enzymes, has jaundice  noted. Intermittent fevers for past week     Patient is a 29-year-old male who is reporting that he was sent to the emergency department for abnormal laboratory values on his liver function studies. Review of the patient's records indicates that he was previously admitted for seizure disorder and encephalopathy. His previous sludge seen within his gallbladder. Patient had an MRCP which was negative for acute cholecystitis in September of this year. He reports that he did follow-up with a surgeon at [facility] and was told that he had about "60% function of his gallbladder" and that he would not require surgery. Patient began to experience fevers last week. He reports a Tmax of 103. He reports that he was having nausea vomiting and diarrhea. He had followed up with his primary care provider and had laboratory values obtained which noted some elevated liver function studies he was advised to come to the emergency room. His mother reported that patient had waited and had elected to come today when he noticed that he was having a color change. History provided by:  Patient and parent  Vomiting  Severity:  Moderate  Timing:  Constant  Progression:  Partially resolved  Associated symptoms: diarrhea and fever    Associated symptoms: no abdominal pain and no chills        Prior to Admission Medications   Prescriptions Last Dose Informant Patient Reported? Taking?    Cinnamon 500 MG TABS 12/10/2023 Self Yes Yes   Sig: Take 1 tablet by mouth daily   Crisaborole (Eucrisa) 2 % OINT   Yes No   Sig: Apply topically   Patient not taking: Reported on 6/8/2022   Multiple Vitamins-Minerals (MULTIVITAMIN WITH MINERALS) tablet 12/10/2023 Self Yes Yes   Sig: Take 1 tablet by mouth daily   Potassium 75 MG TABS 12/10/2023  Yes Yes   Sig: Take 75 mg by mouth daily   allopurinol (ZYLOPRIM) 300 mg tablet 12/10/2023 Self Yes Yes   Sig: Take 300 mg by mouth daily   carvedilol (COREG) 6.25 mg tablet   Yes No   Sig: Take 6.25 mg by mouth 2 (two) times a day with meals   glipiZIDE (GLUCOTROL) 5 mg tablet 12/10/2023  Yes Yes   Sig: Take 5 mg by mouth daily   glucosamine-chondroitin 500-400 MG tablet Not Taking  Yes No   Sig: Take 1 tablet by mouth 3 (three) times a day   Patient not taking: Reported on 12/11/2023   hydrocortisone 2.5 % cream 12/10/2023  Yes Yes   Sig: APPLY TWICE A DAY AS NEEDED FOR PSORIASIS ON FACE. USE SPARINGLY   lisinopril (ZESTRIL) 5 mg tablet 12/10/2023  Yes Yes   Sig: TAKE 1 TABLET BY MOUTH EVERY DAY FOR ELEVATED BLOOD PRESSURE   nystatin-triamcinolone (MYCOLOG-II) cream   Yes No   Sig: Apply topically Three times a day   saccharomyces boulardii (FLORASTOR) 250 mg capsule 12/10/2023  Yes Yes   Sig: Take 250 mg by mouth 2 (two) times a day   thiamine 100 MG tablet Not Taking  No No   Sig: Take 1 tablet (100 mg total) by mouth daily   Patient not taking: Reported on 12/11/2023   valACYclovir (VALTREX) 1,000 mg tablet   No No   Sig: Take 1 tablet (1,000 mg total) by mouth 3 (three) times a day for 7 days   Patient not taking: Reported on 9/18/2023      Facility-Administered Medications: None       Past Medical History:   Diagnosis Date    Allergic     Diabetes mellitus ([address])     Gout     Hypertension     Psoriasis     Sinus infection        Past Surgical History:   Procedure Laterality Date    DENTAL SURGERY      KNEE SURGERY Left     NOSE SURGERY      WRIST FRACTURE SURGERY         Family History   Problem Relation Age of Onset    Heart disease Mother     Diabetes Mother     Parkinsonism Father     Heart disease Father      I have reviewed and agree with the history as documented.     E-Cigarette/Vaping    E-Cigarette Use Never User      E-Cigarette/Vaping Substances Social History     Tobacco Use    Smoking status: Former     Types: Cigarettes     Quit date:      Years since quittin.9    Smokeless tobacco: Former     Types: Chew   Vaping Use    Vaping Use: Never used   Substance Use Topics    Alcohol use: Yes     Comment: socially     Drug use: Never       Review of Systems   Constitutional:  Positive for fever. Negative for chills. Respiratory: Negative. Cardiovascular: Negative. Gastrointestinal:  Positive for diarrhea, nausea and vomiting. Negative for abdominal pain. Genitourinary: Negative. Negative for difficulty urinating, frequency and urgency. All other systems reviewed and are negative. Physical Exam  Physical Exam  Vitals and nursing note reviewed. Constitutional:       Appearance: Normal appearance. He is well-developed. He is not ill-appearing or toxic-appearing. HENT:      Head: Normocephalic and atraumatic. Hair is normal.      Jaw: No pain on movement. Right Ear: External ear normal.      Left Ear: External ear normal.      Nose: Nose normal. No congestion. Mouth/Throat:      Mouth: Mucous membranes are moist.   Eyes:      General: Lids are normal. Scleral icterus present. Extraocular Movements: Extraocular movements intact. Conjunctiva/sclera: Conjunctivae normal.      Pupils: Pupils are equal, round, and reactive to light. Cardiovascular:      Rate and Rhythm: Normal rate and regular rhythm. Heart sounds: Normal heart sounds. No murmur heard. Pulmonary:      Effort: Pulmonary effort is normal. No respiratory distress. Breath sounds: Normal breath sounds. No decreased breath sounds, wheezing, rhonchi or rales. Abdominal:      General: Abdomen is flat. There is no distension. Palpations: Abdomen is soft. Abdomen is not rigid. Tenderness: There is no abdominal tenderness. There is no right CVA tenderness, left CVA tenderness, guarding or rebound.    Musculoskeletal:         General: No swelling, tenderness, deformity or signs of injury. Normal range of motion. Cervical back: Normal range of motion and neck supple. Skin:     General: Skin is warm and dry. Coloration: Skin is jaundiced. Skin is not pale. Findings: No rash. Neurological:      General: No focal deficit present. Mental Status: He is alert and oriented to person, place, and time. Mental status is at baseline.    Psychiatric:         Attention and Perception: Attention normal.         Mood and Affect: Mood normal.         Speech: Speech normal.         Behavior: Behavior normal.         Vital Signs  ED Triage Vitals   Temperature Pulse Respirations Blood Pressure SpO2   12/11/23 1308 12/11/23 1308 12/11/23 1308 12/11/23 1308 12/11/23 1308   97.7 °F (36.5 °C) 84 16 132/82 95 %      Temp Source Heart Rate Source Patient Position - Orthostatic VS BP Location FiO2 (%)   12/11/23 1308 12/11/23 1308 12/11/23 1645 12/11/23 1308 --   Temporal Monitor Lying Left arm       Pain Score       --                  Vitals:    12/11/23 1800 12/11/23 1815 12/11/23 1830 12/11/23 1845   BP: 114/75 118/73 147/86 116/73   Pulse: 82 83 96 86   Patient Position - Orthostatic VS:             Visual Acuity      ED Medications  Medications   lactated ringers infusion (150 mL/hr Intravenous New Bag 12/11/23 1335)   allopurinol (ZYLOPRIM) tablet 300 mg (has no administration in time range)   carvedilol (COREG) tablet 6.25 mg (has no administration in time range)   Cinnamon TABS 500 mg (has no administration in time range)   glipiZIDE (GLUCOTROL) tablet 5 mg (has no administration in time range)   hydrocortisone 2.5 % cream (has no administration in time range)   lisinopril (ZESTRIL) tablet 5 mg (has no administration in time range)   multivitamin-minerals (CENTRUM) tablet 1 tablet (has no administration in time range)   potassium chloride (K-DUR,KLOR-CON) CR tablet 10 mEq (has no administration in time range)   saccharomyces boulardii (FLORASTOR) capsule 250 mg (has no administration in time range)   iohexol (OMNIPAQUE) 350 MG/ML injection (SINGLE-DOSE) 100 mL (100 mL Intravenous Given 12/11/23 1501)       Diagnostic Studies  Results Reviewed       Procedure Component Value Units Date/Time    Protime-INR [[id number]]  (Abnormal) Collected: 12/11/23 1921    Lab Status: Final result Specimen: Blood from Arm, Right Updated: 12/11/23 1943     Protime 22.7 seconds      INR 1.96    APTT [[id number]]  (Abnormal) Collected: 12/11/23 1921    Lab Status: Final result Specimen: Blood from Arm, Right Updated: 12/11/23 1943     PTT 45 seconds     Lactic acid, plasma (w/reflex if result > 2.0) [[id number]] Collected: 12/11/23 1921    Lab Status:  In process Specimen: Blood from Arm, Right Updated: 12/11/23 1925    Manual Differential(PHLEBS Do Not Order) [[id number]]  (Abnormal) Collected: 12/11/23 1346    Lab Status: Final result Specimen: Blood from Arm, Right Updated: 12/11/23 1440     Segmented % 48 %      Bands % 20 %      Lymphocytes % 20 %      Monocytes % 8 %      Eosinophils, % 0 %      Basophils % 0 %      Atypical Lymphocytes % 4 %      Absolute Neutrophils 7.38 Thousand/uL      Lymphocytes Absolute 2.60 Thousand/uL      Monocytes Absolute 0.87 Thousand/uL      Eosinophils Absolute 0.00 Thousand/uL      Basophils Absolute 0.00 Thousand/uL      Total Counted --     Toxic Vacuolated Neutrophils Present     Platelet Estimate Decreased     Large Platelet Present    Comprehensive metabolic panel [[id number]]  (Abnormal) Collected: 12/11/23 1327    Lab Status: Final result Specimen: Blood from Arm, Right Updated: 12/11/23 1408     Sodium 125 mmol/L      Potassium 3.3 mmol/L      Chloride 88 mmol/L      CO2 26 mmol/L      ANION GAP 11 mmol/L      BUN 19 mg/dL      Creatinine 1.06 mg/dL      Glucose 160 mg/dL      Calcium 8.9 mg/dL       U/L      ALT 75 U/L      Alkaline Phosphatase 134 U/L      Total Protein 7.6 g/dL      Albumin 3.5 g/dL      Total Bilirubin 10.66 mg/dL      eGFR 84 ml/min/1.73sq m     Narrative:      Specimen [facility] guidelines for Chronic Kidney Disease (CKD):     Stage 1 with normal or high GFR (GFR > 90 mL/min/1.73 square meters)    Stage 2 Mild CKD (GFR = 60-89 mL/min/1.73 square meters)    Stage 3A Moderate CKD (GFR = 45-59 mL/min/1.73 square meters)    Stage 3B Moderate CKD (GFR = 30-44 mL/min/1.73 square meters)    Stage 4 Severe CKD (GFR = 15-29 mL/min/1.73 square meters)    Stage 5 End Stage CKD (GFR <15 mL/min/1.73 square meters)  Note: GFR calculation is accurate only with a steady state creatinine    Magnesium [[id number]]  (Abnormal) Collected: 12/11/23 1327    Lab Status: Final result Specimen: Blood from Arm, Right Updated: 12/11/23 1408     Magnesium 1.6 mg/dL     Narrative:      Specimen Icteric. Phosphorus [[id number]]  (Abnormal) Collected: 12/11/23 1327    Lab Status: Final result Specimen: Blood from Arm, Right Updated: 12/11/23 1408     Phosphorus 2.4 mg/dL     Narrative:      Specimen Icteric. Ammonia [[id number]]  (Normal) Collected: 12/11/23 1327    Lab Status: Final result Specimen: Blood from Arm, Right Updated: 12/11/23 1408     Ammonia 45 umol/L     Narrative:      Specimen Icteric.     CBC and differential [[id number]]  (Abnormal) Collected: 12/11/23 1346    Lab Status: Final result Specimen: Blood from Arm, Right Updated: 12/11/23 1402     WBC 10.85 Thousand/uL      RBC 4.34 Million/uL      Hemoglobin 14.3 g/dL      Hematocrit 40.8 %      MCV 94 fL      MCH 32.9 pg      MCHC 35.0 g/dL      RDW 13.9 %      MPV 11.9 fL      Platelets 93 Thousands/uL     Ethanol [[id number]]  (Normal) Collected: 12/11/23 1327    Lab Status: Final result Specimen: Blood from Arm, Right Updated: 12/11/23 1358     Ethanol Lvl <10 mg/dL     Lipase [[id number]]  (Normal) Collected: 12/11/23 1327    Lab Status: Final result Specimen: Blood from Arm, Right Updated: 12/11/23 1358     Lipase 22 u/L CT abdomen pelvis with contrast   Final Result by Aquilino Funk MD (12/11 9955)      No acute findings in the abdomen or pelvis. Gallbladder is not distended. High density material in the lumen likely represents tiny stones versus sludge. Liver morphology suggestive of chronic liver disease/cirrhosis. No focal lesion. Splenomegaly. Varices and small volume ascites as described above suspicious for portal hypertension. Workstation performed: [id number]                    Procedures  Procedures         ED Course  ED Course as of 12/11/23 1948   Mon Dec 11, 2023   1411 TOTAL BILIRUBIN(!): 10.66   1632 CAT scan reviewed. Contracted gallbladder with sludge again noted. This imaging study was compared to films from outside sources as well. Will discuss this case with general surgery. 1706 Patient was evaluated by surgery. Concern for impending liver failure and recommends transfer at a tertiary center. [place] with Dr. Staci Austin. Suspect alcoholic cirrhosis/liver failure. Agrees with need for transfer to [facility]. [address] Patient accept by Dr. Bertram Fernando at [address] Patient's home medications ordered pending transfer. Medical Decision Making  Patient presented to the emergency department and a MSE was performed. The patient was evaluated for complaint related to acute nausea and/or vomiting and/or diarrhea. Patient is potentially at risk for, but not limited to, viral infection, celiac disease, Crohn's disease, irritable bowel syndrome, colitis, malabsorption syndrome, or C. Difficile, or other disease process unrelated to the abdomen which may cause this symptomatology is also considered. Several of these diagnoses have been evaluated and ruled out by history and physical.  As needed, patient will be further evaluated with laboratory and imaging studies.   Higher level diagnostics, such as CT imaging or ultrasound, may also be required. Please see work-up portion of the note for further evaluation of patient's risk. Socioeconomic factors were also considered as part of the decision-making process. Unless otherwise stated in the chart or patient is admitted as elsewhere documented, any previously prescribed medications will be maintained. Problems Addressed:  Acute liver failure: acute illness or injury  Gallbladder sludge: chronic illness or injury  Hyperbilirubinemia: acute illness or injury    Amount and/or Complexity of Data Reviewed  Labs: ordered. Decision-making details documented in ED Course. Radiology: ordered. Risk  OTC drugs. Prescription drug management. Risk Details: Patient presented to the emergency department and a MSE was performed. The patient was evaluated and diagnosed with acute failure. This is a new issue that will require additional planned work-up and treatment in a hospitalized setting. As may have been required as part of this evaluation, clinical laboratory test, radiology imaging and medical testing (I.e. EKG) were ordered as necessitated by the patient's presentation. I independently reviewed these studies, imaging and testing. This patient's case is considered to be a considerable risk secondary to the above listed disease process and poses a threat to the patient's well-being and baseline function. Further in-patient diagnostic testing and management, which may include the administration of parenteral medications, is required. Pt required transfer to [facility] secondary to the need for GI and hepatology.                  Disposition  Final diagnoses:   Hyperbilirubinemia   Acute liver failure   Gallbladder sludge     Time reflects when diagnosis was documented in both MDM as applicable and the Disposition within this note       Time User Action Codes Description Comment    12/11/2023  5:32 PM Gay Allan Add [E80.6] Hyperbilirubinemia     12/11/2023  5:32 PM Roselia Juan Add [K72.00] Acute liver failure     12/11/2023  5:32 PM Roselia Juan Add [K82.8] Gallbladder sludge           ED Disposition       ED Disposition   Transfer to Another [address] [place] [address] Ave of Network    Condition   --    Date/Time   Mon Dec 11, 2023  5:32 PM    Comment   Matt Alcala should be transferred out to [facility].                MD Documentation      Lisa Kiser Most Recent Value   Patient Condition The patient has been stabilized such that within reasonable medical probability, no material deterioration of the patient condition or the condition of the unborn child(calos) is likely to result from the transfer   Reason for Transfer Level of Care needed not available at this facility  [Hepatologist]   Benefits of Transfer Specialized equipment and/or services available at the receiving facility (Include comment)________________________  [Hepatologist]   Risks of Transfer Potential for delay in receiving treatment, Potential deterioration of medical condition, Loss of IV, Increased discomfort during transfer   Accepting Physician [address] Name, [address] [place] [address], [facility]    (Name & Tel number) Edis Doty   Sending MD [facility]   Provider Certification General risk, such as traffic hazards, adverse weather conditions, rough terrain or turbulence, possible failure of equipment (including vehicle or aircraft), or consequences of actions of persons outside the control of the transport personnel, Unanticipated needs of medical equipment and personnel during transport, Risk of worsening condition, The possibility of a transport vehicle being unavailable          RN Documentation      Flowsheet Row Most [address] [place] [address] Name, [address] [place] [address], [facility]    (Name & Tel number) Edis Doty          Follow-up Information    None         Patient's Medications Discharge Prescriptions    No medications on file       No discharge procedures on file.     PDMP Review       None            ED Provider  Electronically Signed by             Aydin Belle DO  12/11/23 1946

## 2023-12-11 NOTE — EMTALA/ACUTE CARE TRANSFER
[address] [address]  Dept: [phone]      EMTALA TRANSFER CONSENT    NAME Isael Mckinley                                         1980                              MRN [id number]    I have been informed of my rights regarding examination, treatment, and transfer   by Dr. Marquez Gupta DO    Benefits: Specialized equipment and/or services available at the receiving facility (Include comment)________________________ (Hepatologist)    Risks: Potential for delay in receiving treatment, Potential deterioration of medical condition, Loss of IV, Increased discomfort during transfer      Consent for Transfer:  I acknowledge that my medical condition has been evaluated and explained to me by the emergency department physician or other qualified medical person and/or my attending physician, who has recommended that I be transferred to the service of  Accepting Physician: Soha Solitario at [address] [place] [address] [address] Name, [address] : [facility]. The above potential benefits of such transfer, the potential risks associated with such transfer, and the probable risks of not being transferred have been explained to me, and I fully understand them. The doctor has explained that, in my case, the benefits of transfer outweigh the risks. I agree to be transferred. I authorize the performance of emergency medical procedures and treatments upon me in both transit and upon arrival at the receiving facility. Additionally, I authorize the release of any and all medical records to the receiving facility and request they be transported with me, if possible. I understand that the safest mode of transportation during a medical emergency is an ambulance and that the Hospital advocates the use of this mode of transport.  Risks of traveling to the receiving facility by car, including absence of medical control, life sustaining equipment, such as oxygen, and medical personnel has been explained to me and I fully understand them. (DONALD CORRECT BOX BELOW)  [email or web address]  ]  I consent to the stated transfer and to be transported by ambulance/helicopter. [  ]  I consent to the stated transfer, but refuse transportation by ambulance and accept full responsibility for my transportation by car. I understand the risks of non-ambulance transfers and I exonerate the Hospital and its staff from any deterioration in my condition that results from this refusal.    X___________________________________________    DATE  23  TIME________  Signature of patient or legally responsible individual signing on patient behalf           RELATIONSHIP TO PATIENT__Self_______________________          Provider Certification    NAME Eleizer Spear                                         1980                              MRN [id number]    A medical screening exam was performed on the above named patient. Based on the examination:    Condition Necessitating Transfer The primary encounter diagnosis was Hyperbilirubinemia. Diagnoses of Acute liver failure and Gallbladder sludge were also pertinent to this visit.     Patient Condition: The patient has been stabilized such that within reasonable medical probability, no material deterioration of the patient condition or the condition of the unborn child(calos) is likely to result from the transfer    Reason for Transfer: Level of Care needed not available at this facility (Hepatologist)    Transfer Requirements: [facility] available and qualified personnel available for treatment as acknowledged by [employer] to accept transfer and to provide appropriate medical treatment as acknowledged by       [facility]  Appropriate medical records of the examination and treatment of the patient are provided at the time of transfer   [address] _______  Transfer will be performed by qualified personnel from    and appropriate transfer equipment as required, including the use of necessary and appropriate life support measures. Provider Certification: I have examined the patient and explained the following risks and benefits of being transferred/refusing transfer to the patient/family:  General risk, such as traffic hazards, adverse weather conditions, rough terrain or turbulence, possible failure of equipment (including vehicle or aircraft), or consequences of actions of persons outside the control of the transport personnel, Unanticipated needs of medical equipment and personnel during transport, Risk of worsening condition, The possibility of a transport vehicle being unavailable      Based on these reasonable risks and benefits to the patient and/or the unborn child(calos), and based upon the information available at the time of the patient’s examination, I certify that the medical benefits reasonably to be expected from the provision of appropriate medical treatments at another medical facility outweigh the increasing risks, if any, to the individual’s medical condition, and in the case of labor to the unborn child, from effecting the transfer.     X____________________________________________ DATE 12/11/23        TIME_______      ORIGINAL - SEND TO MEDICAL RECORDS   COPY - SEND WITH PATIENT DURING TRANSFER

## 2023-12-12 VITALS
WEIGHT: 214.51 LBS | BODY MASS INDEX: 26.67 KG/M2 | RESPIRATION RATE: 18 BRPM | HEART RATE: 103 BPM | TEMPERATURE: 97.7 F | OXYGEN SATURATION: 94 % | DIASTOLIC BLOOD PRESSURE: 82 MMHG | SYSTOLIC BLOOD PRESSURE: 137 MMHG | HEIGHT: 75 IN

## 2023-12-12 LAB
ALBUMIN SERPL BCP-MCNC: 2.9 G/DL (ref 3.5–5)
ALP SERPL-CCNC: 122 U/L (ref 34–104)
ALT SERPL W P-5'-P-CCNC: 65 U/L (ref 7–52)
ANION GAP SERPL CALCULATED.3IONS-SCNC: 8 MMOL/L
APAP SERPL-MCNC: <2 UG/ML (ref 10–20)
APAP SERPL-MCNC: <2 UG/ML (ref 10–20)
AST SERPL W P-5'-P-CCNC: 122 U/L (ref 13–39)
BILIRUB SERPL-MCNC: 10.03 MG/DL (ref 0.2–1)
BUN SERPL-MCNC: 11 MG/DL (ref 5–25)
CALCIUM ALBUM COR SERPL-MCNC: 9.1 MG/DL (ref 8.3–10.1)
CALCIUM SERPL-MCNC: 8.2 MG/DL (ref 8.4–10.2)
CHLORIDE SERPL-SCNC: 90 MMOL/L (ref 96–108)
CO2 SERPL-SCNC: 27 MMOL/L (ref 21–32)
CREAT SERPL-MCNC: 0.79 MG/DL (ref 0.6–1.3)
ERYTHROCYTE [DISTWIDTH] IN BLOOD BY AUTOMATED COUNT: 13.8 % (ref 11.6–15.1)
GFR SERPL CREATININE-BSD FRML MDRD: 108 ML/MIN/1.73SQ M
GLUCOSE SERPL-MCNC: 196 MG/DL (ref 65–140)
HAV IGM SER QL: NORMAL
HBV CORE IGM SER QL: NORMAL
HBV SURFACE AG SER QL: NORMAL
HCT VFR BLD AUTO: 34.2 % (ref 36.5–49.3)
HCV AB SER QL: NORMAL
HGB BLD-MCNC: 12.1 G/DL (ref 12–17)
MAGNESIUM SERPL-MCNC: 1.6 MG/DL (ref 1.9–2.7)
MCH RBC QN AUTO: 32.4 PG (ref 26.8–34.3)
MCHC RBC AUTO-ENTMCNC: 35.4 G/DL (ref 31.4–37.4)
MCV RBC AUTO: 91 FL (ref 82–98)
PLATELET # BLD AUTO: 111 THOUSANDS/UL (ref 149–390)
PMV BLD AUTO: 11.1 FL (ref 8.9–12.7)
POTASSIUM SERPL-SCNC: 3.2 MMOL/L (ref 3.5–5.3)
PROT SERPL-MCNC: 6.4 G/DL (ref 6.4–8.4)
RBC # BLD AUTO: 3.74 MILLION/UL (ref 3.88–5.62)
SALICYLATES SERPL-MCNC: <5 MG/DL (ref 3–20)
SODIUM SERPL-SCNC: 125 MMOL/L (ref 135–147)
WBC # BLD AUTO: 10.8 THOUSAND/UL (ref 4.31–10.16)

## 2023-12-12 PROCEDURE — 80143 DRUG ASSAY ACETAMINOPHEN: CPT | Performed by: FAMILY MEDICINE

## 2023-12-12 PROCEDURE — 83735 ASSAY OF MAGNESIUM: CPT | Performed by: FAMILY MEDICINE

## 2023-12-12 PROCEDURE — 80053 COMPREHEN METABOLIC PANEL: CPT | Performed by: FAMILY MEDICINE

## 2023-12-12 PROCEDURE — 36415 COLL VENOUS BLD VENIPUNCTURE: CPT | Performed by: FAMILY MEDICINE

## 2023-12-12 PROCEDURE — 85027 COMPLETE CBC AUTOMATED: CPT | Performed by: FAMILY MEDICINE

## 2023-12-12 PROCEDURE — 80179 DRUG ASSAY SALICYLATE: CPT | Performed by: FAMILY MEDICINE

## 2023-12-12 PROCEDURE — 80074 ACUTE HEPATITIS PANEL: CPT | Performed by: FAMILY MEDICINE

## 2023-12-12 RX ADMIN — Medication 1 TABLET: at 09:02

## 2023-12-12 RX ADMIN — Medication 250 MG: at 09:01

## 2023-12-12 RX ADMIN — LISINOPRIL 5 MG: 2.5 TABLET ORAL at 09:01

## 2023-12-12 RX ADMIN — CARVEDILOL 6.25 MG: 6.25 TABLET, FILM COATED ORAL at 07:20

## 2023-12-12 RX ADMIN — GLIPIZIDE 5 MG: 5 TABLET ORAL at 09:03

## 2023-12-12 RX ADMIN — ALLOPURINOL 300 MG: 100 TABLET ORAL at 09:03

## 2023-12-12 RX ADMIN — SODIUM CHLORIDE, SODIUM LACTATE, POTASSIUM CHLORIDE, AND CALCIUM CHLORIDE 150 ML/HR: .6; .31; .03; .02 INJECTION, SOLUTION INTRAVENOUS at 04:07

## 2024-02-19 ENCOUNTER — ANESTHESIA EVENT (OUTPATIENT)
Dept: ANESTHESIOLOGY | Facility: HOSPITAL | Age: 44
End: 2024-02-19

## 2024-02-19 ENCOUNTER — ANESTHESIA (OUTPATIENT)
Dept: ANESTHESIOLOGY | Facility: HOSPITAL | Age: 44
End: 2024-02-19

## 2024-02-19 NOTE — ANESTHESIA PREPROCEDURE EVALUATION
Procedure:  PRE-OP ONLY    Relevant Problems   CARDIO   (+) Hypertension      DM    HTN      Physical Exam    Airway    Mallampati score: II  TM Distance: >3 FB  Neck ROM: full     Dental   No notable dental hx     Cardiovascular  Cardiovascular exam normal    Pulmonary  Pulmonary exam normal     Other Findings        Anesthesia Plan  ASA Score- 3     Anesthesia Type- IV sedation with anesthesia with ASA Monitors.         Additional Monitors:     Airway Plan:            Plan Factors-Exercise tolerance (METS): >4 METS.    Chart reviewed. EKG reviewed. Imaging results reviewed. Existing labs reviewed. Patient summary reviewed.    Patient is not a current smoker. Patient not instructed to abstain from smoking on day of procedure. Patient did not smoke on day of surgery.    Obstructive sleep apnea risk education given perioperatively.        Induction-     Postoperative Plan-     Informed Consent- Anesthetic plan and risks discussed with patient.  I personally reviewed this patient with the CRNA. Discussed and agreed on the Anesthesia Plan with the CRNA..

## 2024-02-20 ENCOUNTER — HOSPITAL ENCOUNTER (OUTPATIENT)
Dept: GASTROENTEROLOGY | Facility: HOSPITAL | Age: 44
Setting detail: OUTPATIENT SURGERY
Discharge: HOME | End: 2024-02-20
Attending: HOSPITALIST | Admitting: HOSPITALIST
Payer: COMMERCIAL

## 2024-02-20 ENCOUNTER — ANESTHESIA (OUTPATIENT)
Dept: GASTROENTEROLOGY | Facility: HOSPITAL | Age: 44
End: 2024-02-20

## 2024-02-20 ENCOUNTER — ANESTHESIA EVENT (OUTPATIENT)
Dept: GASTROENTEROLOGY | Facility: HOSPITAL | Age: 44
End: 2024-02-20

## 2024-02-20 VITALS
RESPIRATION RATE: 20 BRPM | SYSTOLIC BLOOD PRESSURE: 123 MMHG | WEIGHT: 214 LBS | OXYGEN SATURATION: 97 % | DIASTOLIC BLOOD PRESSURE: 91 MMHG | BODY MASS INDEX: 26.61 KG/M2 | HEIGHT: 75 IN | TEMPERATURE: 97.7 F | HEART RATE: 79 BPM

## 2024-02-20 DIAGNOSIS — K20.90 ESOPHAGITIS: Primary | ICD-10-CM

## 2024-02-20 DIAGNOSIS — K70.30 ALCOHOLIC CIRRHOSIS OF LIVER WITHOUT ASCITES (HCC): ICD-10-CM

## 2024-02-20 LAB
GLUCOSE SERPL-MCNC: 193 MG/DL (ref 65–140)
GLUCOSE SERPL-MCNC: 212 MG/DL (ref 65–140)

## 2024-02-20 PROCEDURE — 88305 TISSUE EXAM BY PATHOLOGIST: CPT | Performed by: PATHOLOGY

## 2024-02-20 PROCEDURE — 82948 REAGENT STRIP/BLOOD GLUCOSE: CPT

## 2024-02-20 RX ORDER — OMEPRAZOLE 40 MG/1
40 CAPSULE, DELAYED RELEASE ORAL DAILY
Qty: 90 CAPSULE | Refills: 0 | Status: SHIPPED | OUTPATIENT
Start: 2024-02-20 | End: 2024-05-20

## 2024-02-20 RX ORDER — LABETALOL HYDROCHLORIDE 5 MG/ML
INJECTION, SOLUTION INTRAVENOUS AS NEEDED
Status: DISCONTINUED | OUTPATIENT
Start: 2024-02-20 | End: 2024-02-20

## 2024-02-20 RX ORDER — LIDOCAINE HYDROCHLORIDE 20 MG/ML
INJECTION, SOLUTION EPIDURAL; INFILTRATION; INTRACAUDAL; PERINEURAL AS NEEDED
Status: DISCONTINUED | OUTPATIENT
Start: 2024-02-20 | End: 2024-02-20

## 2024-02-20 RX ORDER — SODIUM CHLORIDE, SODIUM LACTATE, POTASSIUM CHLORIDE, CALCIUM CHLORIDE 600; 310; 30; 20 MG/100ML; MG/100ML; MG/100ML; MG/100ML
INJECTION, SOLUTION INTRAVENOUS CONTINUOUS PRN
Status: DISCONTINUED | OUTPATIENT
Start: 2024-02-20 | End: 2024-02-20

## 2024-02-20 RX ORDER — PROPOFOL 10 MG/ML
INJECTION, EMULSION INTRAVENOUS AS NEEDED
Status: DISCONTINUED | OUTPATIENT
Start: 2024-02-20 | End: 2024-02-20

## 2024-02-20 RX ADMIN — SODIUM CHLORIDE, SODIUM LACTATE, POTASSIUM CHLORIDE, AND CALCIUM CHLORIDE: .6; .31; .03; .02 INJECTION, SOLUTION INTRAVENOUS at 13:12

## 2024-02-20 RX ADMIN — PROPOFOL 50 MG: 10 INJECTION, EMULSION INTRAVENOUS at 13:09

## 2024-02-20 RX ADMIN — Medication 40 MG: at 13:10

## 2024-02-20 RX ADMIN — PROPOFOL 50 MG: 10 INJECTION, EMULSION INTRAVENOUS at 13:10

## 2024-02-20 RX ADMIN — Medication 40 MG: at 13:26

## 2024-02-20 RX ADMIN — PROPOFOL 170 MCG/KG/MIN: 10 INJECTION, EMULSION INTRAVENOUS at 13:11

## 2024-02-20 RX ADMIN — LABETALOL HYDROCHLORIDE 5 MG: 5 INJECTION, SOLUTION INTRAVENOUS at 13:24

## 2024-02-20 RX ADMIN — SODIUM CHLORIDE, SODIUM LACTATE, POTASSIUM CHLORIDE, AND CALCIUM CHLORIDE: .6; .31; .03; .02 INJECTION, SOLUTION INTRAVENOUS at 13:01

## 2024-02-20 RX ADMIN — PROPOFOL 50 MG: 10 INJECTION, EMULSION INTRAVENOUS at 13:07

## 2024-02-20 RX ADMIN — LIDOCAINE HYDROCHLORIDE 100 MG: 20 INJECTION, SOLUTION EPIDURAL; INFILTRATION; INTRACAUDAL; PERINEURAL at 13:04

## 2024-02-20 RX ADMIN — LABETALOL HYDROCHLORIDE 5 MG: 5 INJECTION, SOLUTION INTRAVENOUS at 13:22

## 2024-02-20 RX ADMIN — PROPOFOL 50 MG: 10 INJECTION, EMULSION INTRAVENOUS at 13:05

## 2024-02-20 RX ADMIN — PROPOFOL 200 MG: 10 INJECTION, EMULSION INTRAVENOUS at 13:04

## 2024-02-20 NOTE — H&P
Procedure(s):  Colonoscopy with indication(s) of CRC screening  Esophagogastroduodenuoscopy with the indication(s) of screening for varices    Endoscopy Pre-Procedure Assessment:  Prior to the procedure, the patient is identified.  The patient's history, medications, and allergies have been reviewed.  The patient is competent.  The risks and benefits of the procedure procedure and the planned sedation have been discussed with the patient.  All questions have been answered and informed consent for the procedure has been obtained.    Vitals:    02/20/24 1225   BP: 128/79   Pulse: 75   Resp: 18   Temp: 97.9 °F (36.6 °C)   SpO2: 99%       Physical Exam:  Physical Exam  HENT:      Nose: Nose normal.   Eyes:      Conjunctiva/sclera: Conjunctivae normal.   Cardiovascular:      Rate and Rhythm: Normal rate.   Pulmonary:      Effort: Pulmonary effort is normal.   Abdominal:      Palpations: Abdomen is soft.   Neurological:      General: No focal deficit present.      Mental Status: He is alert.   Psychiatric:         Mood and Affect: Mood normal.                Consent:    We have discussed the procedure in detail. We reviewed risks, benefits and alternative as well as protentional complications including and not limited to medication side effect , infection, bleeding, perforation and the potential need for surgery, ICU admission, CPR, as well as the need for blood product transfusion. Patient verbalized understanding and agreement. All patient questions were answered.     After reviewed the risks and benefits, the patient is deemed in satisfactory condition to undergo the procedure.  The anesthesia plan is to use monitored anesthesia care (MAC).      02/20/24

## 2024-02-20 NOTE — ANESTHESIA PREPROCEDURE EVALUATION
Procedure:  EGD  COLONOSCOPY    Relevant Problems   CARDIO   (+) Hypertension      DM    HTN      Physical Exam    Airway    Mallampati score: II  TM Distance: >3 FB  Neck ROM: full     Dental   No notable dental hx     Cardiovascular  Cardiovascular exam normal    Pulmonary  Pulmonary exam normal     Other Findings        Anesthesia Plan  ASA Score- 3     Anesthesia Type- IV sedation with anesthesia with ASA Monitors.         Additional Monitors:     Airway Plan:            Plan Factors-Exercise tolerance (METS): >4 METS.    Chart reviewed. EKG reviewed. Imaging results reviewed. Existing labs reviewed. Patient summary reviewed.    Patient is not a current smoker. Patient not instructed to abstain from smoking on day of procedure. Patient did not smoke on day of surgery.    Obstructive sleep apnea risk education given perioperatively.        Induction-     Postoperative Plan-     Informed Consent- Anesthetic plan and risks discussed with patient.  I personally reviewed this patient with the CRNA. Discussed and agreed on the Anesthesia Plan with the CRNA..

## 2024-02-20 NOTE — ANESTHESIA POSTPROCEDURE EVALUATION
Post-Op Assessment Note    CV Status:  Stable  Pain Score: 0    Pain management: adequate       Mental Status:  Alert and awake   Hydration Status:  Stable   PONV Controlled:  Controlled   Airway Patency:  Patent     Post Op Vitals Reviewed: Yes    No anethesia notable event occurred.    Staff: CRNA               BP 96/63 (02/20/24 1339)    Temp 97.8 °F (36.6 °C) (02/20/24 1339)    Pulse 89 (02/20/24 1339)   Resp 18 (02/20/24 1339)    SpO2 98 % (02/20/24 1339)

## 2024-02-22 PROCEDURE — 88305 TISSUE EXAM BY PATHOLOGIST: CPT | Performed by: PATHOLOGY

## 2024-04-30 NOTE — PROGRESS NOTES
Kala Dodge is a 38 y.o. male who is currently ordered Vancomycin IV with management by the Pharmacy Consult service. Relevant clinical data and objective / subjective history reviewed. Vancomycin Assessment:  Indication and Goal AUC/Trough: CNS infection (goal -600, trough 15-20)  Clinical Status:   Micro:   pendong  Renal Function:  SCr: 0.76 mg/dL  CrCl: 158.9 mL/min  Renal replacement: Not on dialysis  Days of Therapy: 1  Current Dose: 2000mg loading dose followed by 1500mg iv q12  Vancomycin Plan:  New Dosin mg IV q 12 hours  Estimated AUC: 552 mcg*hr/mL  Estimated Trough: 17 mcg/mL  Next Level: 23 0600 am labs  Renal Function Monitoring: Daily BMP and [place] will continue to follow closely for s/sx of nephrotoxicity, infusion reactions and appropriateness of therapy. BMP and CBC will be ordered per protocol. We will continue to follow the patient’s culture results and clinical progress daily.     Susan Brooks, Pharmacist 75M PMHx Crohn's, AFib/Flutter s/p DCCVs in past, remote ileocectomy and open appendectomy, admitted (6/23) for SBO vs Crohns flare requiring lap converted to open TRE, SBR x 3, left in discontinuity with abthera vac on (6/27), RTOR for ileocolic resection, small bowel anastomosis, and abdominal wall closure on (6/28), c/b fluid collection s/p IR aspiration of perihepatic fluid on (7/3), c/b wound dehiscence s/p RTOR exlap, washout, ileocolic resection  (7/5). With long and complicated hospital course due to periods of severe ELVI and hyponatremia due to dehydration, but stepped back up for to SICU on (9/10) for acute AMS, intermittent hypoglycemia, AFib with RVR. Percutaneous Cholecystostomy placed on (9/11) for enlarged GB, PCT capped 10/5 and uncapped 10/25 for hyperbilirubinemia, Right brachial DVT, left basillic and cephalic superficial thrombus on duplex 11/2, CT 11/14 with ALDEN ground glass opacity of unclear etiology, completed empiric 7day cefepime course 11/22, and another course of 7day cefepime for PNA (1/15-23), hyperbilirubinemia of unclear etiology, resolved candida glabrata fungemia, ELVI resolved, second episode of sinus pause--pacemaker placed (2/5). Perc cony removed 2/8. Discharged home 2/12/24. Patient with numerous ED presentations prior for bleeding at site of granulation tissue requiring transfusions and now presentation for bleeding from site.  General weakness and fatigue since discharge.  No syncope.  No fever or chills.  Pt denies ab pain.

## 2025-01-05 NOTE — ED PROVIDER NOTES
BEHAVIORAL HEALTH AFTERCARE INSTRUCTIONS     #Dial 9-1-1 for life threatening emergency      1) If you start to feel like you cannot keep yourself or others safe; for emergency dial 9-1-1 or go to nearest Emergency Department.      24/7 suicide prevention line, call from   anywhere in the [place]  Sefaira.org/  CALL- 988    3-050-837-TALK ([phone])    [phone] En Español     [facility] [phone] - 0 or text [phone]     For hard of hearing and deaf, you can chat with a Lifeline counselor 24/7, for TTY users: use your preferred relay service or dial 711 then [phone].       Crisis Text Line: Free emotional support.   Text TALK to 378-186  https://www.crisistexSimple Beat.org/     National Sexual Assault Hotline at 064-928-HFNV (7526) online.Good World Games     [facility] Mental Health services/Crisis line 2-1-1   or ([phone]) https://[place].CritiSense.org/     Call [facility] Intake Department: [phone] option 1     2) Avoid weapons or other means of harm.  3) Refrain from alcohol and drug use.  4) Continue to see your outpatient providers for all mental health and medical needs.  5) All final recommendations for care and disposition are made by the emergency department attending.   6) Call [phone] when ready to schedule a telephone assessment for day treatment options, Virtual Intensive Outpatient Program (IOP).     [facility] Out[facility] Centralized Scheduling number [phone]  Monday -Friday 8:00am to 5:00pm;  When you call:  Have the name and number of your [place] Primary Care Provider.   Have your insurance Card Ready.  If traveling to an appointment is not a problem, let the  know. They will widen the search for someone to see you.      IF YOU ARE UNABLE TO KEEP YOUR APPOINTMENT OR NEED TO CANCEL AN [place] OUTPATIENT APPOINTMENT:  Call: [phone] at least 24hrs before your scheduled appointment.   Failure to call may restrict  History  Chief Complaint   Patient presents with   • Weakness - Generalized     Nausea and weakness since Saturday. Complains of worsening weakness, decreased intake and unsteady gait and hallucinations     Patient is a 53-year-old male presenting to the emergency department for nausea, vomiting, generalized weakness, hot and cold sweats, lightheadedness, symptoms of been going on for the past 3 days, he was able to tolerate p.o. intake today, however he is concerned that he may be hallucinating as well, as he thought people were working in the woods behind his house around 430 this morning, he also thought he saw his mom in his house over the past few days when she was not actually there, he does admit that he has not been sleeping at night secondary to feeling ill, he also has not been able to tolerate his medications due to vomiting, he has not checked his blood sugar over the past few days, he denies any sick contacts, no questionable food intake, he does have crampy abdominal pain when he is about to vomit or have an episode of diarrhea, however denies any pain at present time, no chest pain, no cough or congestion          Prior to Admission Medications   Prescriptions Last Dose Informant Patient Reported? Taking?    Cinnamon 500 MG TABS 9/18/2023 Self Yes Yes   Sig: Take 1 tablet by mouth daily   Crisaborole (Eucrisa) 2 % OINT   Yes No   Sig: Apply topically   Patient not taking: Reported on 6/8/2022   Multiple Vitamins-Minerals (MULTIVITAMIN WITH MINERALS) tablet 9/17/2023 Self Yes Yes   Sig: Take 1 tablet by mouth daily   Potassium 75 MG TABS 9/17/2023  Yes Yes   Sig: Take 75 mg by mouth daily   allopurinol (ZYLOPRIM) 300 mg tablet 9/17/2023 Self Yes Yes   Sig: Take 300 mg by mouth daily   glipiZIDE (GLUCOTROL) 5 mg tablet 9/18/2023  Yes Yes   Sig: Take 5 mg by mouth daily   glucosamine-chondroitin 500-400 MG tablet   Yes Yes   Sig: Take 1 tablet by mouth 3 (three) times a day   hydrocortisone 2.5 % cream Past Week  Yes Yes   Sig: APPLY TWICE A DAY AS NEEDED FOR PSORIASIS ON FACE. USE SPARINGLY   lisinopril (ZESTRIL) 5 mg tablet 2023  Yes Yes   Sig: TAKE 1 TABLET BY MOUTH EVERY DAY FOR ELEVATED BLOOD PRESSURE   nystatin-triamcinolone (MYCOLOG-II) cream   Yes No   Sig: Apply topically Three times a day   saccharomyces boulardii (FLORASTOR) 250 mg capsule 2023  Yes Yes   Sig: Take 250 mg by mouth 2 (two) times a day   valACYclovir (VALTREX) 1,000 mg tablet Not Taking  No No   Sig: Take 1 tablet (1,000 mg total) by mouth 3 (three) times a day for 7 days   Patient not taking: Reported on 2023      Facility-Administered Medications: None       Past Medical History:   Diagnosis Date   • Allergic    • Diabetes mellitus ([address])    • Gout    • Hypertension    • Psoriasis    • Sinus infection        Past Surgical History:   Procedure Laterality Date   • DENTAL SURGERY     • KNEE SURGERY Left    • NOSE SURGERY     • WRIST FRACTURE SURGERY         Family History   Problem Relation Age of Onset   • Heart disease Mother    • Diabetes Mother    • Parkinsonism Father    • Heart disease Father      I have reviewed and agree with the history as documented. E-Cigarette/Vaping   • E-Cigarette Use Never User      E-Cigarette/Vaping Substances     Social History     Tobacco Use   • Smoking status: Former     Types: Cigarettes     Quit date:      Years since quittin.7   • Smokeless tobacco: Former     Types: Chew   Vaping Use   • Vaping Use: Never used   Substance Use Topics   • Alcohol use: Yes     Comment: socially    • Drug use: Never       Review of Systems   Constitutional: Positive for activity change, chills, diaphoresis and fatigue. HENT: Negative. Eyes: Negative. Respiratory: Negative. Cardiovascular: Negative. Gastrointestinal: Positive for diarrhea, nausea and vomiting. Endocrine: Negative. Genitourinary: Negative. Musculoskeletal: Positive for myalgias. Skin: Negative. scheduling future appointments.     Additional Information  FREE SMART PHONE JAILENE- Virtual Hope Box                         [place] Tobacco Quit Line: [phone]                    [facility] Center: [phone]  National [place] on Mental Illness (RANJEET): [phone]    [facility] of health services; https://www.dhs.[place].gov/  http://mhaw[place].org/  http://www.samhsa.gov/  http://www.[facility]ert[facility].org/        [facility] Online Services   https://www.[facility].org/  Sign in to [facility]  [address] [place] [address] [phone]. Referral from [place] PCP only.     [facility]   [address] [place] [address] [phone] OP / [phone] IP Intake.   T18, T19 and commercial.  No T19 ages 21-61, T19 BCBS or [employer] HMO.     [facility] Human Services   [address] [place] [address] [phone]  [facility] Crisis Intervention: [phone]     [facility] IOP, residential  [address] [place] [address] [phone]. T18, T18 and commercial. Serving [facility] and surrounding areas.      [facility]  [address] [place] [address] [phone]. T18, T19 and commercial insurance.       [facility] Inpatient/outpatient  [address] [place] [address] [phone]     [facility]  [address] [place] [address] [phone]. T18, T19 and commercial. Call to verify insurance. Intensive Out-Patient available.     [facility] (managed by [facility])  Peer-run Respite Center, mental health, 18 and older [address] [place] [address] [phone]. [phone].     [facility]  [address] [place] [address]  [phone]. Free clinic/MH services  1X per week     [facility] ([facility])   [address] [place] [address] [phone]. T19 and commercial insurance.   No T18 (Medicare)     [facility] and Human Services [address] [place] [address]   [phone] Call for services.  [facility] Crisis Intervention: [phone]     [facility]  [address] [place] [address]  PHP and IOP for depression, anxiety, PTSD, eating D/O's, and OCD. [phone] or [phone]      [facility] Inpatient/Outpatient  [address] [place] [address] [phone]. Call to verify insurance.      [facility]  [address] [place] [address] [phone].   [address] [place] [address] [phone].    [address] [place] [address] [phone]   T18, T19 and commercial.     [facility] inpatient/outpatient  [address] [place] [address] [phone]. Call to verify insurance.  Inpatient treatment for ages 5 and up     [facility] inpatient/outpatient  [address] [place] [address] [phone]. Call to verify insurance.   Inpatient treatment for ages 18 and up     [facility] Crisis Line [phone]  [facility] [phone]  [facility] Crisis Line [phone]     [facility]  [facility] [place] [address]    Clinic [phone]  [address] [address]  PHP and IOP groups [phone]  T18, T19 and commercial insurance.     [facility] ([facility])   [address] [place] [address]  Main line [phone]  Emergency Department [phone]   Intake [phone]  Call to verify insurance.  Inpatient Treatment for ages adolescents and up     [place]  Allergic/Immunologic: Negative. Neurological: Positive for light-headedness. Hematological: Negative. Psychiatric/Behavioral: Positive for hallucinations. Physical Exam  Physical Exam  Constitutional:       Appearance: He is well-developed. He is ill-appearing. HENT:      Head: Normocephalic and atraumatic. Mouth/Throat:      Mouth: Mucous membranes are dry. Eyes:      Conjunctiva/sclera: Conjunctivae normal.      Pupils: Pupils are equal, round, and reactive to light. Cardiovascular:      Rate and Rhythm: Regular rhythm. Tachycardia present. Heart sounds: Normal heart sounds. Pulmonary:      Effort: Pulmonary effort is normal.      Breath sounds: Normal breath sounds. Abdominal:      Palpations: Abdomen is soft. Tenderness: There is no abdominal tenderness. Musculoskeletal:         General: Normal range of motion. Cervical back: Normal range of motion and neck supple. Skin:     General: Skin is warm and dry. Neurological:      Mental Status: He is alert and oriented to person, place, and time.          Vital Signs  ED Triage Vitals   Temperature Pulse Respirations Blood Pressure SpO2   09/18/23 1249 09/18/23 1249 09/18/23 1249 09/18/23 1249 09/18/23 1249   98.7 °F (37.1 °C) (!) 109 20 (!) 182/119 96 %      Temp Source Heart Rate Source Patient Position - Orthostatic VS BP Location FiO2 (%)   09/18/23 1249 09/18/23 1249 09/18/23 1445 09/18/23 1249 --   Temporal Monitor Lying Left arm       Pain Score       09/18/23 1807       No Pain           Vitals:    09/18/23 1600 09/18/23 1615 09/18/23 1630 09/18/23 1801   BP: 148/93 153/88 154/95 (!) 163/109   Pulse: 102 99 89 94   Patient Position - Orthostatic VS:             Visual Acuity  Visual Acuity    Flowsheet Row Most Recent Value   L Pupil Size (mm) 3   R Pupil Size (mm) 3          ED Medications  Medications   glucosamine-chondroitin 500-400 MG tablet 1 tablet (has no administration in time range)   lisinopril (ZESTRIL) tablet 5 mg (has no administration in time range)   saccharomyces boulardii (FLORASTOR) capsule 250 mg (has no administration in time range)   Cinnamon TABS 500 mg (has no administration in time range)   multivitamin-minerals (CENTRUM) tablet 1 tablet (has no administration in time range)   sodium chloride 0.9 % bolus 1,000 mL (0 mL Intravenous Stopped 9/18/23 1418)   ondansetron (ZOFRAN) injection 4 mg (4 mg Intravenous Given 9/18/23 1318)   cefTRIAXone (ROCEPHIN) IVPB (premix in dextrose) 1,000 mg 50 mL (1,000 mg Intravenous New Bag 9/18/23 1822)       Diagnostic Studies  Results Reviewed     Procedure Component Value Units Date/Time    Urine Microscopic [[id number]]  (Normal) Collected: 09/18/23 1320    Lab Status: Final result Specimen: Urine, Clean Catch Updated: 09/18/23 1429     RBC, UA None Seen /hpf      WBC, UA 2-4 /hpf      Epithelial Cells Occasional /hpf      Bacteria, UA Occasional /hpf     FLU/RSV/COVID - if FLU/RSV clinically relevant [[id number]]  (Normal) Collected: 09/18/23 1313    Lab Status: Final result Specimen: Nares from Nose Updated: 09/18/23 1417     SARS-CoV-2 Negative     INFLUENZA A PCR Negative     INFLUENZA B PCR Negative     RSV PCR Negative    Narrative:      FOR PEDIATRIC PATIENTS - copy/paste COVID Guidelines URL to browser: [email or web address]    SARS-CoV-2 assay is a Nucleic Acid Amplification assay intended for the  qualitative detection of nucleic acid from SARS-CoV-2 in nasopharyngeal  swabs. Results are for the presumptive identification of SARS-CoV-2 RNA. Positive results are indicative of infection with SARS-CoV-2, the virus  causing COVID-19, but do not rule out bacterial infection or co-infection  with other viruses. Laboratories within the [facility] and its  territories are required to report all positive results to the appropriate  public health authorities.  Negative results do not preclude [facility] Human Services Department  [address] [place], [place] [address] [phone].   Uninsured, T18, T19, call to verify insurance.      [facility]  [address] [place] [address]   [phone]. T18, T19 and commercial insurance. No T19 Managed Health.     [facility]  [address] [place] [address]  [phone]. DBT available. Call to verify insurance.     [facility]: Community Based Residential Facility   For residents of [facility]. Must call first.  No drop-ins. [address] [place] [address]  [phone]      [facility]  [address] [place] [address]   [phone]. T19 and commercial. No T18.     [facility]  [address] [place], [place] [address] [phone] T18, T19 and commercial insurance.     [facility] [place] [address] [address]  [phone]. Call to verify insurance.     [facility]  [address] [facility] [address] [place] [address]   [phone]. Call to verify insurance.     [facility] inpatient/outpatient  [facility] [phone]  [address] [place] [address] [phone] (inpatient)  [address] [place] [address] [phone]   [address] [place] [address] [phone].  T18, T19 and commercial insurance.      [facility] of [address] [address]   [phone]. T18, T19 and commercial.     [facility] Human Services  [address] [place] [address]   [phone].   [address] [place]  [address]  [phone].   Income based, T18, T19 and commercial. 1st visit walk-in only. M-F 8AM-3PM.     [place] Crisis Intervention: [phone]     Information subject to change without notice  SARS-CoV-2  infection and should not be used as the sole basis for treatment or other  patient management decisions. Negative results must be combined with  clinical observations, patient history, and epidemiological information. This test has not been FDA cleared or approved. This test has been authorized by FDA under an Emergency Use Authorization  (EUA). This test is only authorized for the duration of time the  declaration that circumstances exist justifying the authorization of the  emergency use of an in vitro diagnostic tests for detection of SARS-CoV-2  virus and/or diagnosis of COVID-19 infection under section 564(b)(1) of  the Act, 21 U. S.C. 292OPM-0(A)(7), unless the authorization is terminated  or revoked sooner. The test has been validated but independent review by FDA  and CLIA is pending. Test performed using [employer] GeneXpert: This RT-PCR assay targets N2,  a region unique to SARS-CoV-2. A conserved region in the E-gene was chosen  for pan-Sarbecovirus detection which includes SARS-CoV-2. According to CMS-2020-01-R, this platform meets the definition of high-throughput technology.     CBC and differential [[id number]]  (Abnormal) Collected: 09/18/23 1313    Lab Status: Final result Specimen: Blood from Arm, Left Updated: 09/18/23 1415     WBC 16.47 Thousand/uL      RBC 4.31 Million/uL      Hemoglobin 14.8 g/dL      Hematocrit 42.7 %      MCV 99 fL      MCH 34.3 pg      MCHC 34.7 g/dL      RDW 12.9 %      MPV 11.0 fL      Platelets 90 Thousands/uL      nRBC 0 /100 WBCs      Neutrophils Relative 84 %      Immat GRANS % 1 %      Lymphocytes Relative 5 %      Monocytes Relative 8 %      Eosinophils Relative 2 %      Basophils Relative 0 %      Neutrophils Absolute 13.92 Thousands/µL      Immature Grans Absolute 0.10 Thousand/uL      Lymphocytes Absolute 0.89 Thousands/µL      Monocytes Absolute 1.24 Thousand/µL      Eosinophils Absolute 0.25 Thousand/µL      Basophils Absolute 0.07 Thousands/µL https://www.dhs.[place].gov/guide/mh-directory.pdf http://www.[place].org/search-stateresources.aspx     Bilirubin, direct [[id number]]  (Abnormal) Collected: 09/18/23 1313    Lab Status: Final result Specimen: Blood from Arm, Left Updated: 09/18/23 1408     Bilirubin, Direct 1.26 mg/dL     UA w Reflex to Microscopic w Reflex to Culture [[id number]]  (Abnormal) Collected: 09/18/23 1320    Lab Status: Final result Specimen: Urine, Clean Catch Updated: 09/18/23 1344     Color, UA Orange     Clarity, UA Clear     Specific Gravity, UA 1.010     pH, UA 6.0     Leukocytes, UA Negative     Nitrite, UA Negative     Protein, UA 30 (1+) mg/dl      Glucose,  (1/2%) mg/dl      Ketones, UA Trace mg/dl      Urobilinogen, UA 4.0 E.U./dl      Bilirubin, UA Small     Occult Blood, UA Negative    Comprehensive metabolic panel [[id number]]  (Abnormal) Collected: 09/18/23 1313    Lab Status: Final result Specimen: Blood from Arm, Left Updated: 09/18/23 1339     Sodium 130 mmol/L      Potassium 4.1 mmol/L      Chloride 94 mmol/L      CO2 25 mmol/L      ANION GAP 11 mmol/L      BUN 14 mg/dL      Creatinine 0.85 mg/dL      Glucose 314 mg/dL      Calcium 9.6 mg/dL      AST 65 U/L      ALT 31 U/L      Alkaline Phosphatase 153 U/L      Total Protein 8.4 g/dL      Albumin 4.1 g/dL      Total Bilirubin 2.82 mg/dL      eGFR 105 ml/min/1.73sq m     Narrative:      [facility] guidelines for Chronic Kidney Disease (CKD):   •  Stage 1 with normal or high GFR (GFR > 90 mL/min/1.73 square meters)  •  Stage 2 Mild CKD (GFR = 60-89 mL/min/1.73 square meters)  •  Stage 3A Moderate CKD (GFR = 45-59 mL/min/1.73 square meters)  •  Stage 3B Moderate CKD (GFR = 30-44 mL/min/1.73 square meters)  •  Stage 4 Severe CKD (GFR = 15-29 mL/min/1.73 square meters)  •  Stage 5 End Stage CKD (GFR <15 mL/min/1.73 square meters)  Note: GFR calculation is accurate only with a steady state creatinine    Lipase [[id number]]  (Normal) Collected: 09/18/23 1313    Lab Status: Final result Specimen: Blood from Arm, Left Updated: 09/18/23 8587 Lipase 52 u/L     Lactic acid, plasma (w/reflex if result > 2.0) [[id number]]  (Normal) Collected: 09/18/23 1313    Lab Status: Final result Specimen: Blood from Arm, Left Updated: 09/18/23 1337     LACTIC ACID 1.2 mmol/L     Narrative:      Result may be elevated if tourniquet was used during collection. Beta Hydroxybutyrate [[id number]]  (Normal) Collected: 09/18/23 1313    Lab Status: Final result Specimen: Blood from Arm, Left Updated: 09/18/23 1332     BETA-HYDROXYBUTYRATE 0.4 mmol/L     Blood gas, venous [[id number]]  (Abnormal) Collected: 09/18/23 1313    Lab Status: Final result Specimen: Blood from Arm, Left Updated: 09/18/23 1329     pH, Diego 7.421     pCO2, Diego 39.9 mm Hg      pO2, Diego 35.9 mm Hg      HCO3, Diego 25.4 mmol/L      Base Excess, Diego 0.9 mmol/L      O2 Content, Diego 14.5 ml/dL      O2 HGB, VENOUS 65.9 %     Fingerstick Glucose (POCT) [[id number]]  (Abnormal) Collected: 09/18/23 1246    Lab Status: Final result Updated: 09/18/23 1254     POC Glucose 283 mg/dl                  US right upper quadrant   Final Result by Hilda Chavira MD (09/18 1521)      1. Large amount of echogenic material nearly filling the gallbladder lumen presumably representing tumefactive sludge, without findings suspicious for a solid lesion on recent MRI abdomen. No evidence of acute cholecystitis. 2.  Mild hepatomegaly and steatosis. The study was marked in EPIC for significant notification.       Workstation performed: [id number]         MRI abdomen wo contrast and mrcp    (Results Pending)              Procedures  Procedures         ED Course  ED Course as of 09/18/23 1853   Mon Sep 18, 2023   6126 Urine Microscopic   1852 UA w Reflex to Microscopic w Reflex to Culture(!)   2664 FLU/RSV/COVID - if FLU/RSV clinically relevant   1852 CBC and differential(!)   1852 Bilirubin, direct(!)   1852 Lipase   1852 Comprehensive metabolic panel(!)   3981 Lactic acid, plasma (w/reflex if result > 2.0)   1852 Blood gas, venous(!)   1852 Beta Hydroxybutyrate   1852 Fingerstick Glucose (POCT)(!)   1852 MRI abdomen wo contrast and mrcp   1852 US right upper quadrant                               SBIRT 22yo+    Flowsheet Row Most Recent Value   Initial Alcohol Screen: US AUDIT-C     1. How often do you have a drink containing alcohol? 3 Filed at: 09/18/2023 1325   2. How many drinks containing alcohol do you have on a typical day you are drinking? 3 Filed at: 09/18/2023 1325   3a. Male UNDER 65: How often do you have five or more drinks on one occasion? 0 Filed at: 09/18/2023 1325   Audit-C Score 6 Filed at: 09/18/2023 1325   ZANDER: How many times in the past year have you. .. Used an illegal drug or used a prescription medication for non-medical reasons? Never Filed at: 09/18/2023 1325                    Medical Decision Making  Abdominal exam without peritoneal signs. No evidence of acute abdomen at this time. Given work-up,acute pancreatitis (negative lipase), PUD (including gastric perforation), pneumonia, pyelonephritis, acute appendicitis, vascular catastrophe, bowel obstruction, viscus perforation, ovarian/testicular torsion, or diverticulitis. Patient with elevated bilirubin, elevated white blood cell count, mild transaminitis, ultrasound shows gallbladder to be filled with echogenic material, findings were discussed with on-call surgeon, Dr. Stephanie Alexander who recommends admit to medicine service, MRCP, patient therefore discussed with medicine service who agrees to admit for further evaluation and treatment, plan was discussed with patient and mother at bedside, patient reluctant but agreeable to admission at this time    Elevated bilirubin: acute illness or injury  Gallbladder anomaly: acute illness or injury  Vomiting: acute illness or injury  Amount and/or Complexity of Data Reviewed  Labs: ordered. Radiology: ordered. Risk  Prescription drug management.   Decision regarding hospitalization. Disposition  Final diagnoses:   Vomiting   Elevated bilirubin   Gallbladder anomaly     Time reflects when diagnosis was documented in both MDM as applicable and the Disposition within this note     Time User Action Codes Description Comment    9/18/2023  4:31 PM Natalie Media Add [R11.10] Vomiting     9/18/2023  4:32 PM [place] Media Add [R17] Elevated bilirubin     9/18/2023  4:32 PM [place] Media Add [Q44.1] Gallbladder anomaly     9/18/2023  6:51 PM Ana Sevilla Add [R11.2,  R19.7] Nausea vomiting and diarrhea       ED Disposition     ED Disposition   Admit    Condition   Stable    Date/Time   Mon Sep 18, 2023  4:31 PM    Comment   Case was discussed with Dr Desouza and the patient's admission status was agreed to be Admission Status: inpatient status to the service of Dr. Shady Dubois . Follow-up Information    None         Current Discharge Medication List      CONTINUE these medications which have NOT CHANGED    Details   allopurinol (ZYLOPRIM) 300 mg tablet Take 300 mg by mouth daily      Cinnamon 500 MG TABS Take 1 tablet by mouth daily      glipiZIDE (GLUCOTROL) 5 mg tablet Take 5 mg by mouth daily      glucosamine-chondroitin 500-400 MG tablet Take 1 tablet by mouth 3 (three) times a day      hydrocortisone 2.5 % cream APPLY TWICE A DAY AS NEEDED FOR PSORIASIS ON FACE.  USE SPARINGLY      lisinopril (ZESTRIL) 5 mg tablet TAKE 1 TABLET BY MOUTH EVERY DAY FOR ELEVATED BLOOD PRESSURE      Multiple Vitamins-Minerals (MULTIVITAMIN WITH MINERALS) tablet Take 1 tablet by mouth daily      Potassium 75 MG TABS Take 75 mg by mouth daily      saccharomyces boulardii (FLORASTOR) 250 mg capsule Take 250 mg by mouth 2 (two) times a day      Crisaborole (Eucrisa) 2 % OINT Apply topically      nystatin-triamcinolone (MYCOLOG-II) cream Apply topically Three times a day      valACYclovir (VALTREX) 1,000 mg tablet Take 1 tablet (1,000 mg total) by mouth 3 (three) times a day for 7 days  Qty: 21 tablet, Refills: 0    Associated Diagnoses: Rash             No discharge procedures on file.     PDMP Review     None          ED Provider  Electronically Signed by           Otis Marc DO  09/18/23 [id number]